# Patient Record
Sex: MALE | URBAN - METROPOLITAN AREA
[De-identification: names, ages, dates, MRNs, and addresses within clinical notes are randomized per-mention and may not be internally consistent; named-entity substitution may affect disease eponyms.]

---

## 2020-06-29 ENCOUNTER — AMB VIDEO VISIT (OUTPATIENT)
Dept: OTHER | Facility: HOSPITAL | Age: 60
End: 2020-06-29

## 2020-06-29 PROCEDURE — EVISIT: Performed by: FAMILY MEDICINE

## 2022-01-01 ENCOUNTER — APPOINTMENT (INPATIENT)
Dept: RADIOLOGY | Facility: HOSPITAL | Age: 62
DRG: 981 | End: 2022-01-01
Payer: COMMERCIAL

## 2022-01-01 ENCOUNTER — HOSPITAL ENCOUNTER (INPATIENT)
Facility: HOSPITAL | Age: 62
LOS: 18 days | DRG: 981 | End: 2022-09-13
Attending: INTERNAL MEDICINE | Admitting: INTERNAL MEDICINE
Payer: COMMERCIAL

## 2022-01-01 ENCOUNTER — APPOINTMENT (OUTPATIENT)
Dept: RADIOLOGY | Facility: HOSPITAL | Age: 62
DRG: 981 | End: 2022-01-01
Payer: COMMERCIAL

## 2022-01-01 ENCOUNTER — APPOINTMENT (INPATIENT)
Dept: NON INVASIVE DIAGNOSTICS | Facility: HOSPITAL | Age: 62
DRG: 981 | End: 2022-01-01
Payer: COMMERCIAL

## 2022-01-01 ENCOUNTER — APPOINTMENT (OUTPATIENT)
Dept: RADIOLOGY | Facility: HOSPITAL | Age: 62
DRG: 981 | End: 2022-01-01
Attending: RADIOLOGY
Payer: COMMERCIAL

## 2022-01-01 ENCOUNTER — HOME CARE VISIT (OUTPATIENT)
Dept: HOME HEALTH SERVICES | Facility: HOME HEALTHCARE | Age: 62
End: 2022-01-01
Payer: COMMERCIAL

## 2022-01-01 ENCOUNTER — APPOINTMENT (OUTPATIENT)
Dept: NEUROLOGY | Facility: CLINIC | Age: 62
DRG: 981 | End: 2022-01-01
Payer: COMMERCIAL

## 2022-01-01 ENCOUNTER — HOSPICE ADMISSION (OUTPATIENT)
Dept: HOME HOSPICE | Facility: HOSPICE | Age: 62
End: 2022-01-01
Payer: COMMERCIAL

## 2022-01-01 VITALS
SYSTOLIC BLOOD PRESSURE: 135 MMHG | RESPIRATION RATE: 23 BRPM | TEMPERATURE: 100.4 F | HEIGHT: 64 IN | WEIGHT: 158.07 LBS | OXYGEN SATURATION: 94 % | DIASTOLIC BLOOD PRESSURE: 72 MMHG | HEART RATE: 95 BPM | BODY MASS INDEX: 26.99 KG/M2

## 2022-01-01 DIAGNOSIS — I46.9 CARDIAC ARREST (HCC): ICD-10-CM

## 2022-01-01 DIAGNOSIS — Z98.890 HISTORY OF EMBOLECTOMY: ICD-10-CM

## 2022-01-01 DIAGNOSIS — D72.825 BANDEMIA: ICD-10-CM

## 2022-01-01 DIAGNOSIS — Z71.89 GOALS OF CARE, COUNSELING/DISCUSSION: ICD-10-CM

## 2022-01-01 DIAGNOSIS — I26.99 PULMONARY EMBOLISM (HCC): ICD-10-CM

## 2022-01-01 DIAGNOSIS — I61.9 HEMORRHAGIC STROKE (HCC): Primary | ICD-10-CM

## 2022-01-01 DIAGNOSIS — L60.2 OVERGROWN TOENAILS: ICD-10-CM

## 2022-01-01 LAB
2HR DELTA HS TROPONIN: 2151 NG/L
4HR DELTA HS TROPONIN: 1935 NG/L
ABO GROUP BLD: NORMAL
ALBUMIN SERPL BCP-MCNC: 2.1 G/DL (ref 3.5–5)
ALBUMIN SERPL BCP-MCNC: 2.8 G/DL (ref 3.5–5)
ALBUMIN SERPL BCP-MCNC: 3.3 G/DL (ref 3.5–5)
ALP SERPL-CCNC: 104 U/L (ref 46–116)
ALP SERPL-CCNC: 126 U/L (ref 46–116)
ALP SERPL-CCNC: 75 U/L (ref 46–116)
ALT SERPL W P-5'-P-CCNC: 360 U/L (ref 12–78)
ALT SERPL W P-5'-P-CCNC: 40 U/L (ref 12–78)
ALT SERPL W P-5'-P-CCNC: 79 U/L (ref 12–78)
ANION GAP SERPL CALCULATED.3IONS-SCNC: 1 MMOL/L (ref 4–13)
ANION GAP SERPL CALCULATED.3IONS-SCNC: 1 MMOL/L (ref 4–13)
ANION GAP SERPL CALCULATED.3IONS-SCNC: 3 MMOL/L (ref 4–13)
ANION GAP SERPL CALCULATED.3IONS-SCNC: 4 MMOL/L (ref 4–13)
ANION GAP SERPL CALCULATED.3IONS-SCNC: 5 MMOL/L (ref 4–13)
ANION GAP SERPL CALCULATED.3IONS-SCNC: 6 MMOL/L (ref 4–13)
ANION GAP SERPL CALCULATED.3IONS-SCNC: 7 MMOL/L (ref 4–13)
ANION GAP SERPL CALCULATED.3IONS-SCNC: 8 MMOL/L (ref 4–13)
AORTIC ROOT: 3.2 CM
AORTIC ROOT: 3.3 CM
APICAL FOUR CHAMBER EJECTION FRACTION: 52 %
APICAL FOUR CHAMBER EJECTION FRACTION: 59 %
APTT PPP: 34 SECONDS (ref 23–37)
APTT PPP: 67 SECONDS (ref 23–37)
APTT PPP: 77 SECONDS (ref 23–37)
APTT PPP: 82 SECONDS (ref 23–37)
APTT PPP: 93 SECONDS (ref 23–37)
APTT SCREEN TO CONFIRM RATIO: 1 RATIO (ref 0–1.34)
ARTERIAL PATENCY WRIST A: NO
ASCENDING AORTA: 3.6 CM
AST SERPL W P-5'-P-CCNC: 12 U/L (ref 5–45)
AST SERPL W P-5'-P-CCNC: 29 U/L (ref 5–45)
AST SERPL W P-5'-P-CCNC: 291 U/L (ref 5–45)
ATRIAL RATE: 107 BPM
ATRIAL RATE: 119 BPM
ATRIAL RATE: 76 BPM
B2 GLYCOPROT1 IGA SERPL IA-ACNC: 2.1
B2 GLYCOPROT1 IGG SERPL IA-ACNC: 1.1
B2 GLYCOPROT1 IGM SERPL IA-ACNC: <2.9
BACTERIA BLD CULT: NORMAL
BACTERIA SPT RESP CULT: ABNORMAL
BACTERIA UR QL AUTO: ABNORMAL /HPF
BASE EXCESS BLDA CALC-SCNC: -0.3 MMOL/L
BASE EXCESS BLDA CALC-SCNC: -3 MMOL/L (ref -2–3)
BASE EXCESS BLDA CALC-SCNC: -3.2 MMOL/L
BASE EXCESS BLDA CALC-SCNC: -3.8 MMOL/L
BASE EXCESS BLDA CALC-SCNC: 1 MMOL/L (ref -2–3)
BASE EXCESS BLDA CALC-SCNC: 1.2 MMOL/L
BASE EXCESS BLDA CALC-SCNC: 1.5 MMOL/L
BASOPHILS # BLD AUTO: 0 THOUSANDS/ΜL (ref 0–0.1)
BASOPHILS # BLD AUTO: 0.03 THOUSANDS/ΜL (ref 0–0.1)
BASOPHILS # BLD AUTO: 0.04 THOUSANDS/ΜL (ref 0–0.1)
BASOPHILS # BLD AUTO: 0.05 THOUSANDS/ΜL (ref 0–0.1)
BASOPHILS # BLD AUTO: 0.06 THOUSANDS/ΜL (ref 0–0.1)
BASOPHILS # BLD AUTO: 0.07 THOUSANDS/ΜL (ref 0–0.1)
BASOPHILS # BLD AUTO: 0.09 THOUSANDS/ΜL (ref 0–0.1)
BASOPHILS # BLD MANUAL: 0 THOUSAND/UL (ref 0–0.1)
BASOPHILS NFR BLD AUTO: 0 % (ref 0–1)
BASOPHILS NFR BLD AUTO: 1 % (ref 0–1)
BASOPHILS NFR MAR MANUAL: 0 % (ref 0–1)
BILIRUB DIRECT SERPL-MCNC: 0.17 MG/DL (ref 0–0.2)
BILIRUB SERPL-MCNC: 0.47 MG/DL (ref 0.2–1)
BILIRUB SERPL-MCNC: 1.48 MG/DL (ref 0.2–1)
BILIRUB SERPL-MCNC: 2 MG/DL (ref 0.2–1)
BILIRUB UR QL STRIP: NEGATIVE
BLD GP AB SCN SERPL QL: NEGATIVE
BUN SERPL-MCNC: 20 MG/DL (ref 5–25)
BUN SERPL-MCNC: 24 MG/DL (ref 5–25)
BUN SERPL-MCNC: 24 MG/DL (ref 5–25)
BUN SERPL-MCNC: 25 MG/DL (ref 5–25)
BUN SERPL-MCNC: 28 MG/DL (ref 5–25)
BUN SERPL-MCNC: 30 MG/DL (ref 5–25)
BUN SERPL-MCNC: 30 MG/DL (ref 5–25)
BUN SERPL-MCNC: 31 MG/DL (ref 5–25)
BUN SERPL-MCNC: 34 MG/DL (ref 5–25)
BUN SERPL-MCNC: 36 MG/DL (ref 5–25)
BUN SERPL-MCNC: 41 MG/DL (ref 5–25)
BUN SERPL-MCNC: 41 MG/DL (ref 5–25)
BUN SERPL-MCNC: 42 MG/DL (ref 5–25)
BUN SERPL-MCNC: 45 MG/DL (ref 5–25)
BUN SERPL-MCNC: 46 MG/DL (ref 5–25)
BUN SERPL-MCNC: 47 MG/DL (ref 5–25)
BUN SERPL-MCNC: 51 MG/DL (ref 5–25)
BUN SERPL-MCNC: 52 MG/DL (ref 5–25)
BUN SERPL-MCNC: 56 MG/DL (ref 5–25)
BUN SERPL-MCNC: 60 MG/DL (ref 5–25)
BUN SERPL-MCNC: 61 MG/DL (ref 5–25)
CA-I BLD-SCNC: 0.87 MMOL/L (ref 1.12–1.32)
CA-I BLD-SCNC: 0.98 MMOL/L (ref 1.12–1.32)
CA-I BLD-SCNC: 1.01 MMOL/L (ref 1.12–1.32)
CA-I BLD-SCNC: 1.03 MMOL/L (ref 1.12–1.32)
CA-I BLD-SCNC: 1.08 MMOL/L (ref 1.12–1.32)
CA-I BLD-SCNC: 1.12 MMOL/L (ref 1.12–1.32)
CA-I BLD-SCNC: 1.12 MMOL/L (ref 1.12–1.32)
CALCIUM ALBUM COR SERPL-MCNC: 8.4 MG/DL (ref 8.3–10.1)
CALCIUM ALBUM COR SERPL-MCNC: 9.4 MG/DL (ref 8.3–10.1)
CALCIUM SERPL-MCNC: 7.4 MG/DL (ref 8.3–10.1)
CALCIUM SERPL-MCNC: 7.6 MG/DL (ref 8.3–10.1)
CALCIUM SERPL-MCNC: 7.7 MG/DL (ref 8.3–10.1)
CALCIUM SERPL-MCNC: 7.8 MG/DL (ref 8.3–10.1)
CALCIUM SERPL-MCNC: 7.8 MG/DL (ref 8.3–10.1)
CALCIUM SERPL-MCNC: 7.9 MG/DL (ref 8.3–10.1)
CALCIUM SERPL-MCNC: 8 MG/DL (ref 8.3–10.1)
CALCIUM SERPL-MCNC: 8.1 MG/DL (ref 8.3–10.1)
CALCIUM SERPL-MCNC: 8.3 MG/DL (ref 8.3–10.1)
CALCIUM SERPL-MCNC: 8.5 MG/DL (ref 8.3–10.1)
CALCIUM SERPL-MCNC: 8.6 MG/DL (ref 8.3–10.1)
CALCIUM SERPL-MCNC: 8.7 MG/DL (ref 8.3–10.1)
CALCIUM SERPL-MCNC: 8.8 MG/DL (ref 8.3–10.1)
CALCIUM SERPL-MCNC: 9.8 MG/DL (ref 8.3–10.1)
CARDIAC TROPONIN I PNL SERPL HS: 2302 NG/L
CARDIAC TROPONIN I PNL SERPL HS: 2518 NG/L
CARDIAC TROPONIN I PNL SERPL HS: 367 NG/L
CARDIOLIPIN IGA SER IA-ACNC: 8
CARDIOLIPIN IGG SER IA-ACNC: 1.5
CARDIOLIPIN IGM SER IA-ACNC: 1.9
CHLORIDE SERPL-SCNC: 105 MMOL/L (ref 96–108)
CHLORIDE SERPL-SCNC: 105 MMOL/L (ref 96–108)
CHLORIDE SERPL-SCNC: 106 MMOL/L (ref 96–108)
CHLORIDE SERPL-SCNC: 107 MMOL/L (ref 96–108)
CHLORIDE SERPL-SCNC: 109 MMOL/L (ref 96–108)
CHLORIDE SERPL-SCNC: 109 MMOL/L (ref 96–108)
CHLORIDE SERPL-SCNC: 110 MMOL/L (ref 96–108)
CHLORIDE SERPL-SCNC: 111 MMOL/L (ref 96–108)
CHLORIDE SERPL-SCNC: 111 MMOL/L (ref 96–108)
CHLORIDE SERPL-SCNC: 112 MMOL/L (ref 96–108)
CHLORIDE SERPL-SCNC: 112 MMOL/L (ref 96–108)
CHLORIDE SERPL-SCNC: 114 MMOL/L (ref 96–108)
CHLORIDE SERPL-SCNC: 114 MMOL/L (ref 96–108)
CHLORIDE SERPL-SCNC: 115 MMOL/L (ref 96–108)
CHLORIDE SERPL-SCNC: 115 MMOL/L (ref 96–108)
CHLORIDE SERPL-SCNC: 116 MMOL/L (ref 96–108)
CHLORIDE SERPL-SCNC: 116 MMOL/L (ref 96–108)
CHLORIDE SERPL-SCNC: 117 MMOL/L (ref 96–108)
CHLORIDE SERPL-SCNC: 118 MMOL/L (ref 96–108)
CHLORIDE SERPL-SCNC: 119 MMOL/L (ref 96–108)
CHLORIDE SERPL-SCNC: 119 MMOL/L (ref 96–108)
CHOLEST SERPL-MCNC: 171 MG/DL
CLARITY UR: CLEAR
CO2 SERPL-SCNC: 21 MMOL/L (ref 21–32)
CO2 SERPL-SCNC: 22 MMOL/L (ref 21–32)
CO2 SERPL-SCNC: 24 MMOL/L (ref 21–32)
CO2 SERPL-SCNC: 24 MMOL/L (ref 21–32)
CO2 SERPL-SCNC: 25 MMOL/L (ref 21–32)
CO2 SERPL-SCNC: 26 MMOL/L (ref 21–32)
CO2 SERPL-SCNC: 27 MMOL/L (ref 21–32)
CO2 SERPL-SCNC: 27 MMOL/L (ref 21–32)
CO2 SERPL-SCNC: 28 MMOL/L (ref 21–32)
CO2 SERPL-SCNC: 29 MMOL/L (ref 21–32)
CO2 SERPL-SCNC: 29 MMOL/L (ref 21–32)
CO2 SERPL-SCNC: 31 MMOL/L (ref 21–32)
CO2 SERPL-SCNC: 33 MMOL/L (ref 21–32)
COLOR UR: YELLOW
CONFIRM APTT/NORMAL: 40.1 SEC (ref 0–47.6)
CREAT SERPL-MCNC: 0.81 MG/DL (ref 0.6–1.3)
CREAT SERPL-MCNC: 0.84 MG/DL (ref 0.6–1.3)
CREAT SERPL-MCNC: 0.86 MG/DL (ref 0.6–1.3)
CREAT SERPL-MCNC: 0.9 MG/DL (ref 0.6–1.3)
CREAT SERPL-MCNC: 0.97 MG/DL (ref 0.6–1.3)
CREAT SERPL-MCNC: 0.97 MG/DL (ref 0.6–1.3)
CREAT SERPL-MCNC: 1.01 MG/DL (ref 0.6–1.3)
CREAT SERPL-MCNC: 1.04 MG/DL (ref 0.6–1.3)
CREAT SERPL-MCNC: 1.08 MG/DL (ref 0.6–1.3)
CREAT SERPL-MCNC: 1.09 MG/DL (ref 0.6–1.3)
CREAT SERPL-MCNC: 1.12 MG/DL (ref 0.6–1.3)
CREAT SERPL-MCNC: 1.12 MG/DL (ref 0.6–1.3)
CREAT SERPL-MCNC: 1.14 MG/DL (ref 0.6–1.3)
CREAT SERPL-MCNC: 1.16 MG/DL (ref 0.6–1.3)
CREAT SERPL-MCNC: 1.22 MG/DL (ref 0.6–1.3)
CREAT SERPL-MCNC: 1.23 MG/DL (ref 0.6–1.3)
CREAT SERPL-MCNC: 1.26 MG/DL (ref 0.6–1.3)
CREAT SERPL-MCNC: 1.27 MG/DL (ref 0.6–1.3)
CREAT SERPL-MCNC: 1.53 MG/DL (ref 0.6–1.3)
CREAT SERPL-MCNC: 1.77 MG/DL (ref 0.6–1.3)
CREAT SERPL-MCNC: 1.78 MG/DL (ref 0.6–1.3)
DEPRECATED AT III PPP: 97 % OF NORMAL (ref 92–136)
DRVVT IMM 1:2 NP PPP: 43.2 SEC (ref 0–40.4)
DRVVT SCREEN TO CONFIRM RATIO: 1.2 RATIO (ref 0.8–1.2)
EOSINOPHIL # BLD AUTO: 0 THOUSAND/ΜL (ref 0–0.61)
EOSINOPHIL # BLD AUTO: 0.02 THOUSAND/ΜL (ref 0–0.61)
EOSINOPHIL # BLD AUTO: 0.03 THOUSAND/ΜL (ref 0–0.61)
EOSINOPHIL # BLD AUTO: 0.04 THOUSAND/ΜL (ref 0–0.61)
EOSINOPHIL # BLD AUTO: 0.04 THOUSAND/ΜL (ref 0–0.61)
EOSINOPHIL # BLD AUTO: 0.09 THOUSAND/ΜL (ref 0–0.61)
EOSINOPHIL # BLD AUTO: 0.17 THOUSAND/ΜL (ref 0–0.61)
EOSINOPHIL # BLD AUTO: 0.2 THOUSAND/ΜL (ref 0–0.61)
EOSINOPHIL # BLD AUTO: 0.34 THOUSAND/ΜL (ref 0–0.61)
EOSINOPHIL # BLD MANUAL: 0 THOUSAND/UL (ref 0–0.4)
EOSINOPHIL NFR BLD AUTO: 0 % (ref 0–6)
EOSINOPHIL NFR BLD AUTO: 1 % (ref 0–6)
EOSINOPHIL NFR BLD AUTO: 1 % (ref 0–6)
EOSINOPHIL NFR BLD AUTO: 2 % (ref 0–6)
EOSINOPHIL NFR BLD AUTO: 3 % (ref 0–6)
EOSINOPHIL NFR BLD MANUAL: 0 % (ref 0–6)
ERYTHROCYTE [DISTWIDTH] IN BLOOD BY AUTOMATED COUNT: 12.4 % (ref 11.6–15.1)
ERYTHROCYTE [DISTWIDTH] IN BLOOD BY AUTOMATED COUNT: 12.5 % (ref 11.6–15.1)
ERYTHROCYTE [DISTWIDTH] IN BLOOD BY AUTOMATED COUNT: 12.6 % (ref 11.6–15.1)
ERYTHROCYTE [DISTWIDTH] IN BLOOD BY AUTOMATED COUNT: 12.8 % (ref 11.6–15.1)
ERYTHROCYTE [DISTWIDTH] IN BLOOD BY AUTOMATED COUNT: 12.9 % (ref 11.6–15.1)
ERYTHROCYTE [DISTWIDTH] IN BLOOD BY AUTOMATED COUNT: 13 % (ref 11.6–15.1)
ERYTHROCYTE [DISTWIDTH] IN BLOOD BY AUTOMATED COUNT: 13 % (ref 11.6–15.1)
ERYTHROCYTE [DISTWIDTH] IN BLOOD BY AUTOMATED COUNT: 13.1 % (ref 11.6–15.1)
ERYTHROCYTE [DISTWIDTH] IN BLOOD BY AUTOMATED COUNT: 13.2 % (ref 11.6–15.1)
EST. AVERAGE GLUCOSE BLD GHB EST-MCNC: 235 MG/DL
F2 GENE MUT ANL BLD/T: NORMAL
F5 GENE MUT ANL BLD/T: NORMAL
FLUAV RNA RESP QL NAA+PROBE: NEGATIVE
FLUBV RNA RESP QL NAA+PROBE: NEGATIVE
FRACTIONAL SHORTENING: 28 % (ref 28–44)
FRACTIONAL SHORTENING: 30 % (ref 28–44)
GFR SERPL CREATININE-BSD FRML MDRD: 39 ML/MIN/1.73SQ M
GFR SERPL CREATININE-BSD FRML MDRD: 40 ML/MIN/1.73SQ M
GFR SERPL CREATININE-BSD FRML MDRD: 48 ML/MIN/1.73SQ M
GFR SERPL CREATININE-BSD FRML MDRD: 60 ML/MIN/1.73SQ M
GFR SERPL CREATININE-BSD FRML MDRD: 60 ML/MIN/1.73SQ M
GFR SERPL CREATININE-BSD FRML MDRD: 62 ML/MIN/1.73SQ M
GFR SERPL CREATININE-BSD FRML MDRD: 63 ML/MIN/1.73SQ M
GFR SERPL CREATININE-BSD FRML MDRD: 67 ML/MIN/1.73SQ M
GFR SERPL CREATININE-BSD FRML MDRD: 68 ML/MIN/1.73SQ M
GFR SERPL CREATININE-BSD FRML MDRD: 70 ML/MIN/1.73SQ M
GFR SERPL CREATININE-BSD FRML MDRD: 70 ML/MIN/1.73SQ M
GFR SERPL CREATININE-BSD FRML MDRD: 72 ML/MIN/1.73SQ M
GFR SERPL CREATININE-BSD FRML MDRD: 73 ML/MIN/1.73SQ M
GFR SERPL CREATININE-BSD FRML MDRD: 76 ML/MIN/1.73SQ M
GFR SERPL CREATININE-BSD FRML MDRD: 79 ML/MIN/1.73SQ M
GFR SERPL CREATININE-BSD FRML MDRD: 83 ML/MIN/1.73SQ M
GFR SERPL CREATININE-BSD FRML MDRD: 83 ML/MIN/1.73SQ M
GFR SERPL CREATININE-BSD FRML MDRD: 91 ML/MIN/1.73SQ M
GFR SERPL CREATININE-BSD FRML MDRD: 92 ML/MIN/1.73SQ M
GFR SERPL CREATININE-BSD FRML MDRD: 93 ML/MIN/1.73SQ M
GFR SERPL CREATININE-BSD FRML MDRD: 95 ML/MIN/1.73SQ M
GLUCOSE SERPL-MCNC: 111 MG/DL (ref 65–140)
GLUCOSE SERPL-MCNC: 115 MG/DL (ref 65–140)
GLUCOSE SERPL-MCNC: 119 MG/DL (ref 65–140)
GLUCOSE SERPL-MCNC: 123 MG/DL (ref 65–140)
GLUCOSE SERPL-MCNC: 125 MG/DL (ref 65–140)
GLUCOSE SERPL-MCNC: 127 MG/DL (ref 65–140)
GLUCOSE SERPL-MCNC: 128 MG/DL (ref 65–140)
GLUCOSE SERPL-MCNC: 129 MG/DL (ref 65–140)
GLUCOSE SERPL-MCNC: 131 MG/DL (ref 65–140)
GLUCOSE SERPL-MCNC: 131 MG/DL (ref 65–140)
GLUCOSE SERPL-MCNC: 132 MG/DL (ref 65–140)
GLUCOSE SERPL-MCNC: 134 MG/DL (ref 65–140)
GLUCOSE SERPL-MCNC: 134 MG/DL (ref 65–140)
GLUCOSE SERPL-MCNC: 135 MG/DL (ref 65–140)
GLUCOSE SERPL-MCNC: 135 MG/DL (ref 65–140)
GLUCOSE SERPL-MCNC: 137 MG/DL (ref 65–140)
GLUCOSE SERPL-MCNC: 138 MG/DL (ref 65–140)
GLUCOSE SERPL-MCNC: 143 MG/DL (ref 65–140)
GLUCOSE SERPL-MCNC: 146 MG/DL (ref 65–140)
GLUCOSE SERPL-MCNC: 150 MG/DL (ref 65–140)
GLUCOSE SERPL-MCNC: 150 MG/DL (ref 65–140)
GLUCOSE SERPL-MCNC: 151 MG/DL (ref 65–140)
GLUCOSE SERPL-MCNC: 151 MG/DL (ref 65–140)
GLUCOSE SERPL-MCNC: 152 MG/DL (ref 65–140)
GLUCOSE SERPL-MCNC: 152 MG/DL (ref 65–140)
GLUCOSE SERPL-MCNC: 153 MG/DL (ref 65–140)
GLUCOSE SERPL-MCNC: 155 MG/DL (ref 65–140)
GLUCOSE SERPL-MCNC: 156 MG/DL (ref 65–140)
GLUCOSE SERPL-MCNC: 157 MG/DL (ref 65–140)
GLUCOSE SERPL-MCNC: 158 MG/DL (ref 65–140)
GLUCOSE SERPL-MCNC: 158 MG/DL (ref 65–140)
GLUCOSE SERPL-MCNC: 159 MG/DL (ref 65–140)
GLUCOSE SERPL-MCNC: 159 MG/DL (ref 65–140)
GLUCOSE SERPL-MCNC: 160 MG/DL (ref 65–140)
GLUCOSE SERPL-MCNC: 160 MG/DL (ref 65–140)
GLUCOSE SERPL-MCNC: 163 MG/DL (ref 65–140)
GLUCOSE SERPL-MCNC: 164 MG/DL (ref 65–140)
GLUCOSE SERPL-MCNC: 164 MG/DL (ref 65–140)
GLUCOSE SERPL-MCNC: 166 MG/DL (ref 65–140)
GLUCOSE SERPL-MCNC: 167 MG/DL (ref 65–140)
GLUCOSE SERPL-MCNC: 167 MG/DL (ref 65–140)
GLUCOSE SERPL-MCNC: 171 MG/DL (ref 65–140)
GLUCOSE SERPL-MCNC: 172 MG/DL (ref 65–140)
GLUCOSE SERPL-MCNC: 173 MG/DL (ref 65–140)
GLUCOSE SERPL-MCNC: 177 MG/DL (ref 65–140)
GLUCOSE SERPL-MCNC: 177 MG/DL (ref 65–140)
GLUCOSE SERPL-MCNC: 178 MG/DL (ref 65–140)
GLUCOSE SERPL-MCNC: 183 MG/DL (ref 65–140)
GLUCOSE SERPL-MCNC: 185 MG/DL (ref 65–140)
GLUCOSE SERPL-MCNC: 190 MG/DL (ref 65–140)
GLUCOSE SERPL-MCNC: 193 MG/DL (ref 65–140)
GLUCOSE SERPL-MCNC: 194 MG/DL (ref 65–140)
GLUCOSE SERPL-MCNC: 195 MG/DL (ref 65–140)
GLUCOSE SERPL-MCNC: 196 MG/DL (ref 65–140)
GLUCOSE SERPL-MCNC: 200 MG/DL (ref 65–140)
GLUCOSE SERPL-MCNC: 201 MG/DL (ref 65–140)
GLUCOSE SERPL-MCNC: 201 MG/DL (ref 65–140)
GLUCOSE SERPL-MCNC: 202 MG/DL (ref 65–140)
GLUCOSE SERPL-MCNC: 204 MG/DL (ref 65–140)
GLUCOSE SERPL-MCNC: 205 MG/DL (ref 65–140)
GLUCOSE SERPL-MCNC: 206 MG/DL (ref 65–140)
GLUCOSE SERPL-MCNC: 208 MG/DL (ref 65–140)
GLUCOSE SERPL-MCNC: 212 MG/DL (ref 65–140)
GLUCOSE SERPL-MCNC: 212 MG/DL (ref 65–140)
GLUCOSE SERPL-MCNC: 213 MG/DL (ref 65–140)
GLUCOSE SERPL-MCNC: 214 MG/DL (ref 65–140)
GLUCOSE SERPL-MCNC: 218 MG/DL (ref 65–140)
GLUCOSE SERPL-MCNC: 219 MG/DL (ref 65–140)
GLUCOSE SERPL-MCNC: 221 MG/DL (ref 65–140)
GLUCOSE SERPL-MCNC: 221 MG/DL (ref 65–140)
GLUCOSE SERPL-MCNC: 223 MG/DL (ref 65–140)
GLUCOSE SERPL-MCNC: 223 MG/DL (ref 65–140)
GLUCOSE SERPL-MCNC: 229 MG/DL (ref 65–140)
GLUCOSE SERPL-MCNC: 235 MG/DL (ref 65–140)
GLUCOSE SERPL-MCNC: 237 MG/DL (ref 65–140)
GLUCOSE SERPL-MCNC: 238 MG/DL (ref 65–140)
GLUCOSE SERPL-MCNC: 243 MG/DL (ref 65–140)
GLUCOSE SERPL-MCNC: 246 MG/DL (ref 65–140)
GLUCOSE SERPL-MCNC: 247 MG/DL (ref 65–140)
GLUCOSE SERPL-MCNC: 250 MG/DL (ref 65–140)
GLUCOSE SERPL-MCNC: 251 MG/DL (ref 65–140)
GLUCOSE SERPL-MCNC: 253 MG/DL (ref 65–140)
GLUCOSE SERPL-MCNC: 254 MG/DL (ref 65–140)
GLUCOSE SERPL-MCNC: 255 MG/DL (ref 65–140)
GLUCOSE SERPL-MCNC: 256 MG/DL (ref 65–140)
GLUCOSE SERPL-MCNC: 260 MG/DL (ref 65–140)
GLUCOSE SERPL-MCNC: 260 MG/DL (ref 65–140)
GLUCOSE SERPL-MCNC: 263 MG/DL (ref 65–140)
GLUCOSE SERPL-MCNC: 264 MG/DL (ref 65–140)
GLUCOSE SERPL-MCNC: 269 MG/DL (ref 65–140)
GLUCOSE SERPL-MCNC: 272 MG/DL (ref 65–140)
GLUCOSE SERPL-MCNC: 276 MG/DL (ref 65–140)
GLUCOSE SERPL-MCNC: 276 MG/DL (ref 65–140)
GLUCOSE SERPL-MCNC: 281 MG/DL (ref 65–140)
GLUCOSE SERPL-MCNC: 286 MG/DL (ref 65–140)
GLUCOSE SERPL-MCNC: 291 MG/DL (ref 65–140)
GLUCOSE SERPL-MCNC: 293 MG/DL (ref 65–140)
GLUCOSE SERPL-MCNC: 300 MG/DL (ref 65–140)
GLUCOSE SERPL-MCNC: 306 MG/DL (ref 65–140)
GLUCOSE SERPL-MCNC: 308 MG/DL (ref 65–140)
GLUCOSE SERPL-MCNC: 309 MG/DL (ref 65–140)
GLUCOSE SERPL-MCNC: 309 MG/DL (ref 65–140)
GLUCOSE SERPL-MCNC: 312 MG/DL (ref 65–140)
GLUCOSE SERPL-MCNC: 314 MG/DL (ref 65–140)
GLUCOSE SERPL-MCNC: 317 MG/DL (ref 65–140)
GLUCOSE SERPL-MCNC: 342 MG/DL (ref 65–140)
GLUCOSE SERPL-MCNC: 348 MG/DL (ref 65–140)
GLUCOSE SERPL-MCNC: 353 MG/DL (ref 65–140)
GLUCOSE SERPL-MCNC: 357 MG/DL (ref 65–140)
GLUCOSE UR STRIP-MCNC: ABNORMAL MG/DL
GRAM STN SPEC: ABNORMAL
HBA1C MFR BLD: 9.8 %
HCO3 BLDA-SCNC: 20.2 MMOL/L (ref 22–28)
HCO3 BLDA-SCNC: 20.3 MMOL/L (ref 22–28)
HCO3 BLDA-SCNC: 22 MMOL/L (ref 22–28)
HCO3 BLDA-SCNC: 23.2 MMOL/L (ref 22–28)
HCO3 BLDA-SCNC: 23.5 MMOL/L (ref 22–28)
HCO3 BLDA-SCNC: 23.9 MMOL/L (ref 22–28)
HCO3 BLDA-SCNC: 25.4 MMOL/L (ref 22–28)
HCT VFR BLD AUTO: 26.3 % (ref 36.5–49.3)
HCT VFR BLD AUTO: 27.9 % (ref 36.5–49.3)
HCT VFR BLD AUTO: 30.1 % (ref 36.5–49.3)
HCT VFR BLD AUTO: 30.9 % (ref 36.5–49.3)
HCT VFR BLD AUTO: 32.1 % (ref 36.5–49.3)
HCT VFR BLD AUTO: 32.8 % (ref 36.5–49.3)
HCT VFR BLD AUTO: 35.7 % (ref 36.5–49.3)
HCT VFR BLD AUTO: 37.5 % (ref 36.5–49.3)
HCT VFR BLD AUTO: 40.3 % (ref 36.5–49.3)
HCT VFR BLD AUTO: 41.7 % (ref 36.5–49.3)
HCT VFR BLD AUTO: 42.7 % (ref 36.5–49.3)
HCT VFR BLD AUTO: 43.7 % (ref 36.5–49.3)
HCT VFR BLD AUTO: 44.5 % (ref 36.5–49.3)
HCT VFR BLD AUTO: 45.5 % (ref 36.5–49.3)
HCT VFR BLD AUTO: 46.5 % (ref 36.5–49.3)
HCT VFR BLD AUTO: 46.6 % (ref 36.5–49.3)
HCT VFR BLD AUTO: 47.3 % (ref 36.5–49.3)
HCT VFR BLD AUTO: 48.9 % (ref 36.5–49.3)
HCT VFR BLD AUTO: 51.7 % (ref 36.5–49.3)
HCT VFR BLD CALC: 33 % (ref 36.5–49.3)
HCT VFR BLD CALC: 40 % (ref 36.5–49.3)
HDLC SERPL-MCNC: 40 MG/DL
HGB BLD-MCNC: 10 G/DL (ref 12–17)
HGB BLD-MCNC: 10.6 G/DL (ref 12–17)
HGB BLD-MCNC: 10.8 G/DL (ref 12–17)
HGB BLD-MCNC: 10.9 G/DL (ref 12–17)
HGB BLD-MCNC: 12.4 G/DL (ref 12–17)
HGB BLD-MCNC: 12.9 G/DL (ref 12–17)
HGB BLD-MCNC: 13.5 G/DL (ref 12–17)
HGB BLD-MCNC: 13.9 G/DL (ref 12–17)
HGB BLD-MCNC: 14.4 G/DL (ref 12–17)
HGB BLD-MCNC: 15 G/DL (ref 12–17)
HGB BLD-MCNC: 15 G/DL (ref 12–17)
HGB BLD-MCNC: 15.1 G/DL (ref 12–17)
HGB BLD-MCNC: 15.4 G/DL (ref 12–17)
HGB BLD-MCNC: 15.7 G/DL (ref 12–17)
HGB BLD-MCNC: 16.4 G/DL (ref 12–17)
HGB BLD-MCNC: 16.8 G/DL (ref 12–17)
HGB BLD-MCNC: 17.7 G/DL (ref 12–17)
HGB BLD-MCNC: 8.6 G/DL (ref 12–17)
HGB BLD-MCNC: 9.3 G/DL (ref 12–17)
HGB BLDA-MCNC: 11.2 G/DL (ref 12–17)
HGB BLDA-MCNC: 13.6 G/DL (ref 12–17)
HGB UR QL STRIP.AUTO: ABNORMAL
HOROWITZ INDEX BLDA+IHG-RTO: 50 MM[HG]
HOROWITZ INDEX BLDA+IHG-RTO: 60 MM[HG]
HOROWITZ INDEX BLDA+IHG-RTO: 80 MM[HG]
IMM GRANULOCYTES # BLD AUTO: 0.05 THOUSAND/UL (ref 0–0.2)
IMM GRANULOCYTES # BLD AUTO: 0.07 THOUSAND/UL (ref 0–0.2)
IMM GRANULOCYTES # BLD AUTO: 0.08 THOUSAND/UL (ref 0–0.2)
IMM GRANULOCYTES # BLD AUTO: 0.09 THOUSAND/UL (ref 0–0.2)
IMM GRANULOCYTES # BLD AUTO: 0.1 THOUSAND/UL (ref 0–0.2)
IMM GRANULOCYTES # BLD AUTO: 0.11 THOUSAND/UL (ref 0–0.2)
IMM GRANULOCYTES # BLD AUTO: 0.11 THOUSAND/UL (ref 0–0.2)
IMM GRANULOCYTES # BLD AUTO: 0.13 THOUSAND/UL (ref 0–0.2)
IMM GRANULOCYTES # BLD AUTO: 0.42 THOUSAND/UL (ref 0–0.2)
IMM GRANULOCYTES NFR BLD AUTO: 0 % (ref 0–2)
IMM GRANULOCYTES NFR BLD AUTO: 1 % (ref 0–2)
IMM GRANULOCYTES NFR BLD AUTO: 2 % (ref 0–2)
INR PPP: 1.33 (ref 0.84–1.19)
INR PPP: 1.45 (ref 0.84–1.19)
INR PPP: 1.49 (ref 0.84–1.19)
INTERVENTRICULAR SEPTUM IN DIASTOLE (PARASTERNAL SHORT AXIS VIEW): 1 CM
INTERVENTRICULAR SEPTUM IN DIASTOLE (PARASTERNAL SHORT AXIS VIEW): 1.5 CM
INTERVENTRICULAR SEPTUM: 1 CM (ref 0.6–1.1)
INTERVENTRICULAR SEPTUM: 1.5 CM (ref 0.6–1.1)
KCT BLD-ACNC: 122 SEC (ref 89–137)
KCT BLD-ACNC: 225 SEC (ref 89–137)
KETONES UR STRIP-MCNC: ABNORMAL MG/DL
L PNEUMO1 AG UR QL IA.RAPID: NEGATIVE
LA PPP-IMP: ABNORMAL
LAAS-AP2: 13.7 CM2
LAAS-AP4: 14 CM2
LACTATE SERPL-SCNC: 1.1 MMOL/L (ref 0.5–2)
LACTATE SERPL-SCNC: 1.4 MMOL/L (ref 0.5–2)
LACTATE SERPL-SCNC: 1.5 MMOL/L (ref 0.5–2)
LACTATE SERPL-SCNC: 2.6 MMOL/L (ref 0.5–2)
LACTATE SERPL-SCNC: 2.9 MMOL/L (ref 0.5–2)
LACTATE SERPL-SCNC: 6 MMOL/L (ref 0.5–2)
LDLC SERPL CALC-MCNC: 112 MG/DL (ref 0–100)
LEFT ATRIUM AREA SYSTOLE SINGLE PLANE A4C: 13.7 CM2
LEFT ATRIUM SIZE: 3.3 CM
LEFT INTERNAL DIMENSION IN SYSTOLE: 2.3 CM (ref 2.1–4)
LEFT INTERNAL DIMENSION IN SYSTOLE: 3 CM (ref 2.1–4)
LEFT VENTRICLE DIASTOLIC VOLUME (MOD BIPLANE): 79 ML
LEFT VENTRICLE SYSTOLIC VOLUME (MOD BIPLANE): 40 ML
LEFT VENTRICULAR INTERNAL DIMENSION IN DIASTOLE: 3.2 CM (ref 3.5–6)
LEFT VENTRICULAR INTERNAL DIMENSION IN DIASTOLE: 4.3 CM (ref 3.5–6)
LEFT VENTRICULAR POSTERIOR WALL IN END DIASTOLE: 1 CM
LEFT VENTRICULAR POSTERIOR WALL IN END DIASTOLE: 1.5 CM
LEFT VENTRICULAR STROKE VOLUME: 23 ML
LEFT VENTRICULAR STROKE VOLUME: 47 ML
LEUKOCYTE ESTERASE UR QL STRIP: NEGATIVE
LV EF: 50 %
LVSV (TEICH): 23 ML
LVSV (TEICH): 47 ML
LYMPHOCYTES # BLD AUTO: 0.39 THOUSANDS/ΜL (ref 0.6–4.47)
LYMPHOCYTES # BLD AUTO: 0.58 THOUSANDS/ΜL (ref 0.6–4.47)
LYMPHOCYTES # BLD AUTO: 0.73 THOUSANDS/ΜL (ref 0.6–4.47)
LYMPHOCYTES # BLD AUTO: 0.73 THOUSANDS/ΜL (ref 0.6–4.47)
LYMPHOCYTES # BLD AUTO: 0.77 THOUSANDS/ΜL (ref 0.6–4.47)
LYMPHOCYTES # BLD AUTO: 0.82 THOUSANDS/ΜL (ref 0.6–4.47)
LYMPHOCYTES # BLD AUTO: 0.83 THOUSANDS/ΜL (ref 0.6–4.47)
LYMPHOCYTES # BLD AUTO: 0.85 THOUSANDS/ΜL (ref 0.6–4.47)
LYMPHOCYTES # BLD AUTO: 0.94 THOUSANDS/ΜL (ref 0.6–4.47)
LYMPHOCYTES # BLD AUTO: 1.22 THOUSANDS/ΜL (ref 0.6–4.47)
LYMPHOCYTES # BLD AUTO: 1.23 THOUSANDS/ΜL (ref 0.6–4.47)
LYMPHOCYTES # BLD AUTO: 26 % (ref 14–44)
LYMPHOCYTES # BLD AUTO: 4.11 THOUSAND/UL (ref 0.6–4.47)
LYMPHOCYTES NFR BLD AUTO: 1 % (ref 14–44)
LYMPHOCYTES NFR BLD AUTO: 10 % (ref 14–44)
LYMPHOCYTES NFR BLD AUTO: 11 % (ref 14–44)
LYMPHOCYTES NFR BLD AUTO: 11 % (ref 14–44)
LYMPHOCYTES NFR BLD AUTO: 5 % (ref 14–44)
LYMPHOCYTES NFR BLD AUTO: 6 % (ref 14–44)
LYMPHOCYTES NFR BLD AUTO: 7 % (ref 14–44)
LYMPHOCYTES NFR BLD AUTO: 7 % (ref 14–44)
MAGNESIUM SERPL-MCNC: 2.1 MG/DL (ref 1.6–2.6)
MAGNESIUM SERPL-MCNC: 2.3 MG/DL (ref 1.6–2.6)
MAGNESIUM SERPL-MCNC: 2.5 MG/DL (ref 1.6–2.6)
MAGNESIUM SERPL-MCNC: 2.6 MG/DL (ref 1.6–2.6)
MAGNESIUM SERPL-MCNC: 2.7 MG/DL (ref 1.6–2.6)
MAGNESIUM SERPL-MCNC: 2.8 MG/DL (ref 1.6–2.6)
MAGNESIUM SERPL-MCNC: 2.8 MG/DL (ref 1.6–2.6)
MAGNESIUM SERPL-MCNC: 2.9 MG/DL (ref 1.6–2.6)
MCH RBC QN AUTO: 29.5 PG (ref 26.8–34.3)
MCH RBC QN AUTO: 29.7 PG (ref 26.8–34.3)
MCH RBC QN AUTO: 30 PG (ref 26.8–34.3)
MCH RBC QN AUTO: 30.1 PG (ref 26.8–34.3)
MCH RBC QN AUTO: 30.3 PG (ref 26.8–34.3)
MCH RBC QN AUTO: 30.3 PG (ref 26.8–34.3)
MCH RBC QN AUTO: 30.5 PG (ref 26.8–34.3)
MCH RBC QN AUTO: 30.6 PG (ref 26.8–34.3)
MCH RBC QN AUTO: 30.6 PG (ref 26.8–34.3)
MCH RBC QN AUTO: 30.8 PG (ref 26.8–34.3)
MCH RBC QN AUTO: 30.8 PG (ref 26.8–34.3)
MCH RBC QN AUTO: 30.9 PG (ref 26.8–34.3)
MCH RBC QN AUTO: 31 PG (ref 26.8–34.3)
MCH RBC QN AUTO: 31.2 PG (ref 26.8–34.3)
MCH RBC QN AUTO: 31.3 PG (ref 26.8–34.3)
MCHC RBC AUTO-ENTMCNC: 32.5 G/DL (ref 31.4–37.4)
MCHC RBC AUTO-ENTMCNC: 32.7 G/DL (ref 31.4–37.4)
MCHC RBC AUTO-ENTMCNC: 33.2 G/DL (ref 31.4–37.4)
MCHC RBC AUTO-ENTMCNC: 33.2 G/DL (ref 31.4–37.4)
MCHC RBC AUTO-ENTMCNC: 33.3 G/DL (ref 31.4–37.4)
MCHC RBC AUTO-ENTMCNC: 33.3 G/DL (ref 31.4–37.4)
MCHC RBC AUTO-ENTMCNC: 33.5 G/DL (ref 31.4–37.4)
MCHC RBC AUTO-ENTMCNC: 33.6 G/DL (ref 31.4–37.4)
MCHC RBC AUTO-ENTMCNC: 33.7 G/DL (ref 31.4–37.4)
MCHC RBC AUTO-ENTMCNC: 33.7 G/DL (ref 31.4–37.4)
MCHC RBC AUTO-ENTMCNC: 33.8 G/DL (ref 31.4–37.4)
MCHC RBC AUTO-ENTMCNC: 34.2 G/DL (ref 31.4–37.4)
MCHC RBC AUTO-ENTMCNC: 34.3 G/DL (ref 31.4–37.4)
MCHC RBC AUTO-ENTMCNC: 34.3 G/DL (ref 31.4–37.4)
MCHC RBC AUTO-ENTMCNC: 34.4 G/DL (ref 31.4–37.4)
MCHC RBC AUTO-ENTMCNC: 34.4 G/DL (ref 31.4–37.4)
MCHC RBC AUTO-ENTMCNC: 34.7 G/DL (ref 31.4–37.4)
MCHC RBC AUTO-ENTMCNC: 35.2 G/DL (ref 31.4–37.4)
MCV RBC AUTO: 86 FL (ref 82–98)
MCV RBC AUTO: 87 FL (ref 82–98)
MCV RBC AUTO: 89 FL (ref 82–98)
MCV RBC AUTO: 90 FL (ref 82–98)
MCV RBC AUTO: 91 FL (ref 82–98)
MCV RBC AUTO: 92 FL (ref 82–98)
MCV RBC AUTO: 92 FL (ref 82–98)
MCV RBC AUTO: 93 FL (ref 82–98)
MCV RBC AUTO: 94 FL (ref 82–98)
MCV RBC AUTO: 95 FL (ref 82–98)
METAMYELOCYTES NFR BLD MANUAL: 1 % (ref 0–1)
MONOCYTES # BLD AUTO: 0.34 THOUSAND/ΜL (ref 0.17–1.22)
MONOCYTES # BLD AUTO: 0.63 THOUSAND/UL (ref 0–1.22)
MONOCYTES # BLD AUTO: 0.64 THOUSAND/ΜL (ref 0.17–1.22)
MONOCYTES # BLD AUTO: 0.66 THOUSAND/ΜL (ref 0.17–1.22)
MONOCYTES # BLD AUTO: 0.69 THOUSAND/ΜL (ref 0.17–1.22)
MONOCYTES # BLD AUTO: 0.72 THOUSAND/ΜL (ref 0.17–1.22)
MONOCYTES # BLD AUTO: 0.75 THOUSAND/ΜL (ref 0.17–1.22)
MONOCYTES # BLD AUTO: 0.79 THOUSAND/ΜL (ref 0.17–1.22)
MONOCYTES # BLD AUTO: 1.09 THOUSAND/ΜL (ref 0.17–1.22)
MONOCYTES # BLD AUTO: 1.12 THOUSAND/ΜL (ref 0.17–1.22)
MONOCYTES # BLD AUTO: 1.22 THOUSAND/ΜL (ref 0.17–1.22)
MONOCYTES # BLD AUTO: 2.17 THOUSAND/ΜL (ref 0.17–1.22)
MONOCYTES NFR BLD AUTO: 10 % (ref 4–12)
MONOCYTES NFR BLD AUTO: 11 % (ref 4–12)
MONOCYTES NFR BLD AUTO: 3 % (ref 4–12)
MONOCYTES NFR BLD AUTO: 5 % (ref 4–12)
MONOCYTES NFR BLD AUTO: 6 % (ref 4–12)
MONOCYTES NFR BLD AUTO: 7 % (ref 4–12)
MONOCYTES NFR BLD AUTO: 8 % (ref 4–12)
MONOCYTES NFR BLD: 4 % (ref 4–12)
MRSA NOSE QL CULT: NORMAL
MUCOUS THREADS UR QL AUTO: ABNORMAL
MYELOCYTES NFR BLD MANUAL: 3 % (ref 0–1)
NEUTROPHILS # BLD AUTO: 10.84 THOUSANDS/ΜL (ref 1.85–7.62)
NEUTROPHILS # BLD AUTO: 11.86 THOUSANDS/ΜL (ref 1.85–7.62)
NEUTROPHILS # BLD AUTO: 13.08 THOUSANDS/ΜL (ref 1.85–7.62)
NEUTROPHILS # BLD AUTO: 14.99 THOUSANDS/ΜL (ref 1.85–7.62)
NEUTROPHILS # BLD AUTO: 15.91 THOUSANDS/ΜL (ref 1.85–7.62)
NEUTROPHILS # BLD AUTO: 25.64 THOUSANDS/ΜL (ref 1.85–7.62)
NEUTROPHILS # BLD AUTO: 7.52 THOUSANDS/ΜL (ref 1.85–7.62)
NEUTROPHILS # BLD AUTO: 8.31 THOUSANDS/ΜL (ref 1.85–7.62)
NEUTROPHILS # BLD AUTO: 8.36 THOUSANDS/ΜL (ref 1.85–7.62)
NEUTROPHILS # BLD AUTO: 9.39 THOUSANDS/ΜL (ref 1.85–7.62)
NEUTROPHILS # BLD AUTO: 9.81 THOUSANDS/ΜL (ref 1.85–7.62)
NEUTROPHILS # BLD MANUAL: 10.43 THOUSAND/UL (ref 1.85–7.62)
NEUTS BAND NFR BLD MANUAL: 4 % (ref 0–8)
NEUTS SEG NFR BLD AUTO: 62 % (ref 43–75)
NEUTS SEG NFR BLD AUTO: 76 % (ref 43–75)
NEUTS SEG NFR BLD AUTO: 77 % (ref 43–75)
NEUTS SEG NFR BLD AUTO: 81 % (ref 43–75)
NEUTS SEG NFR BLD AUTO: 83 % (ref 43–75)
NEUTS SEG NFR BLD AUTO: 85 % (ref 43–75)
NEUTS SEG NFR BLD AUTO: 86 % (ref 43–75)
NEUTS SEG NFR BLD AUTO: 88 % (ref 43–75)
NEUTS SEG NFR BLD AUTO: 88 % (ref 43–75)
NEUTS SEG NFR BLD AUTO: 89 % (ref 43–75)
NEUTS SEG NFR BLD AUTO: 89 % (ref 43–75)
NEUTS SEG NFR BLD AUTO: 92 % (ref 43–75)
NITRITE UR QL STRIP: NEGATIVE
NON-SQ EPI CELLS URNS QL MICRO: ABNORMAL /HPF
NRBC BLD AUTO-RTO: 0 /100 WBCS
O2 CT BLDA-SCNC: 17.1 ML/DL (ref 16–23)
O2 CT BLDA-SCNC: 18.3 ML/DL (ref 16–23)
O2 CT BLDA-SCNC: 18.4 ML/DL (ref 16–23)
O2 CT BLDA-SCNC: 18.9 ML/DL (ref 16–23)
O2 CT BLDA-SCNC: 23.5 ML/DL (ref 16–23)
OXYHGB MFR BLDA: 97.1 % (ref 94–97)
OXYHGB MFR BLDA: 98 % (ref 94–97)
OXYHGB MFR BLDA: 98.2 % (ref 94–97)
OXYHGB MFR BLDA: 98.3 % (ref 94–97)
OXYHGB MFR BLDA: 98.5 % (ref 94–97)
P AXIS: 55 DEGREES
P AXIS: 58 DEGREES
P AXIS: 71 DEGREES
PCO2 BLD: 23 MMOL/L (ref 21–32)
PCO2 BLD: 27 MMOL/L (ref 21–32)
PCO2 BLD: 38.8 MM HG (ref 36–44)
PCO2 BLD: 40 MM HG (ref 36–44)
PCO2 BLDA: 30.5 MM HG (ref 36–44)
PCO2 BLDA: 30.9 MM HG (ref 36–44)
PCO2 BLDA: 32.2 MM HG (ref 36–44)
PCO2 BLDA: 34.2 MM HG (ref 36–44)
PCO2 BLDA: 34.3 MM HG (ref 36–44)
PEEP RESPIRATORY: 6 CM[H2O]
PH BLD: 7.36 [PH] (ref 7.35–7.45)
PH BLD: 7.41 [PH] (ref 7.35–7.45)
PH BLDA: 7.39 [PH] (ref 7.35–7.45)
PH BLDA: 7.42 [PH] (ref 7.35–7.45)
PH BLDA: 7.45 [PH] (ref 7.35–7.45)
PH BLDA: 7.5 [PH] (ref 7.35–7.45)
PH BLDA: 7.51 [PH] (ref 7.35–7.45)
PH UR STRIP.AUTO: 5.5 [PH]
PHOSPHATE SERPL-MCNC: 1.8 MG/DL (ref 2.3–4.1)
PHOSPHATE SERPL-MCNC: 2.3 MG/DL (ref 2.3–4.1)
PHOSPHATE SERPL-MCNC: 3.1 MG/DL (ref 2.3–4.1)
PHOSPHATE SERPL-MCNC: 3.3 MG/DL (ref 2.3–4.1)
PHOSPHATE SERPL-MCNC: 3.3 MG/DL (ref 2.3–4.1)
PHOSPHATE SERPL-MCNC: 3.4 MG/DL (ref 2.3–4.1)
PHOSPHATE SERPL-MCNC: 3.7 MG/DL (ref 2.3–4.1)
PHOSPHATE SERPL-MCNC: 3.8 MG/DL (ref 2.3–4.1)
PHOSPHATE SERPL-MCNC: 4.6 MG/DL (ref 2.3–4.1)
PHOSPHATE SERPL-MCNC: 5.2 MG/DL (ref 2.3–4.1)
PLATELET # BLD AUTO: 179 THOUSANDS/UL (ref 149–390)
PLATELET # BLD AUTO: 181 THOUSANDS/UL (ref 149–390)
PLATELET # BLD AUTO: 192 THOUSANDS/UL (ref 149–390)
PLATELET # BLD AUTO: 193 THOUSANDS/UL (ref 149–390)
PLATELET # BLD AUTO: 200 THOUSANDS/UL (ref 149–390)
PLATELET # BLD AUTO: 211 THOUSANDS/UL (ref 149–390)
PLATELET # BLD AUTO: 217 THOUSANDS/UL (ref 149–390)
PLATELET # BLD AUTO: 217 THOUSANDS/UL (ref 149–390)
PLATELET # BLD AUTO: 253 THOUSANDS/UL (ref 149–390)
PLATELET # BLD AUTO: 268 THOUSANDS/UL (ref 149–390)
PLATELET # BLD AUTO: 268 THOUSANDS/UL (ref 149–390)
PLATELET # BLD AUTO: 274 THOUSANDS/UL (ref 149–390)
PLATELET # BLD AUTO: 295 THOUSANDS/UL (ref 149–390)
PLATELET # BLD AUTO: 297 THOUSANDS/UL (ref 149–390)
PLATELET # BLD AUTO: 306 THOUSANDS/UL (ref 149–390)
PLATELET # BLD AUTO: 310 THOUSANDS/UL (ref 149–390)
PLATELET # BLD AUTO: 395 THOUSANDS/UL (ref 149–390)
PLATELET # BLD AUTO: 411 THOUSANDS/UL (ref 149–390)
PLATELET BLD QL SMEAR: ADEQUATE
PMV BLD AUTO: 10.5 FL (ref 8.9–12.7)
PMV BLD AUTO: 10.6 FL (ref 8.9–12.7)
PMV BLD AUTO: 10.7 FL (ref 8.9–12.7)
PMV BLD AUTO: 10.8 FL (ref 8.9–12.7)
PMV BLD AUTO: 10.8 FL (ref 8.9–12.7)
PMV BLD AUTO: 10.9 FL (ref 8.9–12.7)
PMV BLD AUTO: 11.1 FL (ref 8.9–12.7)
PMV BLD AUTO: 11.2 FL (ref 8.9–12.7)
PMV BLD AUTO: 11.2 FL (ref 8.9–12.7)
PMV BLD AUTO: 11.6 FL (ref 8.9–12.7)
PMV BLD AUTO: 12 FL (ref 8.9–12.7)
PMV BLD AUTO: 12.1 FL (ref 8.9–12.7)
PMV BLD AUTO: 12.3 FL (ref 8.9–12.7)
PO2 BLD: 340 MM HG (ref 75–129)
PO2 BLD: >400 MM HG (ref 75–129)
PO2 BLDA: 119.1 MM HG (ref 75–129)
PO2 BLDA: 205.2 MM HG (ref 75–129)
PO2 BLDA: 205.4 MM HG (ref 75–129)
PO2 BLDA: 234 MM HG (ref 75–129)
PO2 BLDA: 338.1 MM HG (ref 75–129)
POLYCHROMASIA BLD QL SMEAR: PRESENT
POTASSIUM BLD-SCNC: 3.4 MMOL/L (ref 3.5–5.3)
POTASSIUM BLD-SCNC: 3.7 MMOL/L (ref 3.5–5.3)
POTASSIUM SERPL-SCNC: 2.8 MMOL/L (ref 3.5–5.3)
POTASSIUM SERPL-SCNC: 3.2 MMOL/L (ref 3.5–5.3)
POTASSIUM SERPL-SCNC: 3.2 MMOL/L (ref 3.5–5.3)
POTASSIUM SERPL-SCNC: 3.4 MMOL/L (ref 3.5–5.3)
POTASSIUM SERPL-SCNC: 3.4 MMOL/L (ref 3.5–5.3)
POTASSIUM SERPL-SCNC: 3.5 MMOL/L (ref 3.5–5.3)
POTASSIUM SERPL-SCNC: 3.6 MMOL/L (ref 3.5–5.3)
POTASSIUM SERPL-SCNC: 3.6 MMOL/L (ref 3.5–5.3)
POTASSIUM SERPL-SCNC: 3.7 MMOL/L (ref 3.5–5.3)
POTASSIUM SERPL-SCNC: 3.8 MMOL/L (ref 3.5–5.3)
POTASSIUM SERPL-SCNC: 3.8 MMOL/L (ref 3.5–5.3)
POTASSIUM SERPL-SCNC: 4 MMOL/L (ref 3.5–5.3)
POTASSIUM SERPL-SCNC: 4.1 MMOL/L (ref 3.5–5.3)
POTASSIUM SERPL-SCNC: 4.1 MMOL/L (ref 3.5–5.3)
POTASSIUM SERPL-SCNC: 4.2 MMOL/L (ref 3.5–5.3)
POTASSIUM SERPL-SCNC: 4.3 MMOL/L (ref 3.5–5.3)
POTASSIUM SERPL-SCNC: 5.1 MMOL/L (ref 3.5–5.3)
PR INTERVAL: 146 MS
PR INTERVAL: 146 MS
PR INTERVAL: 154 MS
PROCALCITONIN SERPL-MCNC: 0.12 NG/ML
PROCALCITONIN SERPL-MCNC: 0.14 NG/ML
PROCALCITONIN SERPL-MCNC: 0.16 NG/ML
PROCALCITONIN SERPL-MCNC: 0.23 NG/ML
PROCALCITONIN SERPL-MCNC: 7.41 NG/ML
PROT C AG ACT/NOR PPP IA: 116 % OF NORMAL (ref 60–150)
PROT S ACT/NOR PPP: 111 % (ref 71–117)
PROT S ACT/NOR PPP: 127 % (ref 61–136)
PROT S PPP-ACNC: 100 % (ref 60–150)
PROT SERPL-MCNC: 5.7 G/DL (ref 6.4–8.4)
PROT SERPL-MCNC: 5.8 G/DL (ref 6.4–8.4)
PROT SERPL-MCNC: 7 G/DL (ref 6.4–8.4)
PROT UR STRIP-MCNC: ABNORMAL MG/DL
PROTHROMBIN TIME: 16.7 SECONDS (ref 11.6–14.5)
PROTHROMBIN TIME: 17.9 SECONDS (ref 11.6–14.5)
PROTHROMBIN TIME: 18.3 SECONDS (ref 11.6–14.5)
QRS AXIS: -35 DEGREES
QRS AXIS: -75 DEGREES
QRS AXIS: -9 DEGREES
QRSD INTERVAL: 104 MS
QRSD INTERVAL: 104 MS
QRSD INTERVAL: 108 MS
QT INTERVAL: 342 MS
QT INTERVAL: 342 MS
QT INTERVAL: 426 MS
QTC INTERVAL: 457 MS
QTC INTERVAL: 479 MS
QTC INTERVAL: 482 MS
RBC # BLD AUTO: 2.78 MILLION/UL (ref 3.88–5.62)
RBC # BLD AUTO: 3.04 MILLION/UL (ref 3.88–5.62)
RBC # BLD AUTO: 3.21 MILLION/UL (ref 3.88–5.62)
RBC # BLD AUTO: 3.39 MILLION/UL (ref 3.88–5.62)
RBC # BLD AUTO: 3.53 MILLION/UL (ref 3.88–5.62)
RBC # BLD AUTO: 4 MILLION/UL (ref 3.88–5.62)
RBC # BLD AUTO: 4.19 MILLION/UL (ref 3.88–5.62)
RBC # BLD AUTO: 4.42 MILLION/UL (ref 3.88–5.62)
RBC # BLD AUTO: 4.62 MILLION/UL (ref 3.88–5.62)
RBC # BLD AUTO: 4.76 MILLION/UL (ref 3.88–5.62)
RBC # BLD AUTO: 4.92 MILLION/UL (ref 3.88–5.62)
RBC # BLD AUTO: 4.95 MILLION/UL (ref 3.88–5.62)
RBC # BLD AUTO: 5.08 MILLION/UL (ref 3.88–5.62)
RBC # BLD AUTO: 5.09 MILLION/UL (ref 3.88–5.62)
RBC # BLD AUTO: 5.14 MILLION/UL (ref 3.88–5.62)
RBC # BLD AUTO: 5.37 MILLION/UL (ref 3.88–5.62)
RBC # BLD AUTO: 5.51 MILLION/UL (ref 3.88–5.62)
RBC # BLD AUTO: 6 MILLION/UL (ref 3.88–5.62)
RBC #/AREA URNS AUTO: ABNORMAL /HPF
RBC MORPH BLD: PRESENT
RH BLD: POSITIVE
RIGHT ATRIUM AREA SYSTOLE A4C: 13.6 CM2
RIGHT VENTRICLE ID DIMENSION: 3.9 CM
RSV RNA RESP QL NAA+PROBE: NEGATIVE
S PNEUM AG UR QL: NEGATIVE
SAO2 % BLD FROM PO2: 100 % (ref 60–85)
SARS-COV-2 RNA RESP QL NAA+PROBE: NEGATIVE
SCREEN APTT: 35 SEC (ref 0–51.9)
SCREEN DRVVT: 51.4 SEC (ref 0–47)
SL CV LEFT ATRIUM LENGTH A2C: 4.2 CM
SL CV LV EF: 50
SL CV LV EF: 60
SL CV LV EF: 60
SL CV PED ECHO LEFT VENTRICLE DIASTOLIC VOLUME (MOD BIPLANE) 2D: 41 ML
SL CV PED ECHO LEFT VENTRICLE DIASTOLIC VOLUME (MOD BIPLANE) 2D: 83 ML
SL CV PED ECHO LEFT VENTRICLE SYSTOLIC VOLUME (MOD BIPLANE) 2D: 18 ML
SL CV PED ECHO LEFT VENTRICLE SYSTOLIC VOLUME (MOD BIPLANE) 2D: 36 ML
SODIUM BLD-SCNC: 146 MMOL/L (ref 136–145)
SODIUM BLD-SCNC: 148 MMOL/L (ref 136–145)
SODIUM SERPL-SCNC: 136 MMOL/L (ref 135–147)
SODIUM SERPL-SCNC: 138 MMOL/L (ref 135–147)
SODIUM SERPL-SCNC: 139 MMOL/L (ref 135–147)
SODIUM SERPL-SCNC: 140 MMOL/L (ref 135–147)
SODIUM SERPL-SCNC: 141 MMOL/L (ref 135–147)
SODIUM SERPL-SCNC: 141 MMOL/L (ref 135–147)
SODIUM SERPL-SCNC: 142 MMOL/L (ref 135–147)
SODIUM SERPL-SCNC: 143 MMOL/L (ref 135–147)
SODIUM SERPL-SCNC: 143 MMOL/L (ref 135–147)
SODIUM SERPL-SCNC: 144 MMOL/L (ref 135–147)
SODIUM SERPL-SCNC: 146 MMOL/L (ref 135–147)
SODIUM SERPL-SCNC: 146 MMOL/L (ref 135–147)
SODIUM SERPL-SCNC: 147 MMOL/L (ref 135–147)
SODIUM SERPL-SCNC: 148 MMOL/L (ref 135–147)
SODIUM SERPL-SCNC: 148 MMOL/L (ref 135–147)
SODIUM SERPL-SCNC: 149 MMOL/L (ref 135–147)
SODIUM SERPL-SCNC: 149 MMOL/L (ref 135–147)
SODIUM SERPL-SCNC: 150 MMOL/L (ref 135–147)
SODIUM SERPL-SCNC: 150 MMOL/L (ref 135–147)
SP GR UR STRIP.AUTO: 1.02 (ref 1–1.03)
SPECIMEN EXPIRATION DATE: NORMAL
SPECIMEN SOURCE: ABNORMAL
SPECIMEN SOURCE: NORMAL
T WAVE AXIS: 135 DEGREES
T WAVE AXIS: 171 DEGREES
T WAVE AXIS: 92 DEGREES
THROMBIN TIME: 15.8 SEC (ref 0–23)
TRIGL SERPL-MCNC: 95 MG/DL
UROBILINOGEN UR STRIP-ACNC: <2 MG/DL
VANCOMYCIN SERPL-MCNC: 11.2 UG/ML (ref 10–20)
VENT AC: 14
VENT AC: 16
VENT AC: 18
VENT AC: 20
VENT AC: 20
VENT- AC: AC
VENTRICULAR RATE: 107 BPM
VENTRICULAR RATE: 119 BPM
VENTRICULAR RATE: 76 BPM
VT SETTING VENT: 350 ML
VT SETTING VENT: 370 ML
VT SETTING VENT: 400 ML
WBC # BLD AUTO: 10.1 THOUSAND/UL (ref 4.31–10.16)
WBC # BLD AUTO: 10.88 THOUSAND/UL (ref 4.31–10.16)
WBC # BLD AUTO: 10.95 THOUSAND/UL (ref 4.31–10.16)
WBC # BLD AUTO: 11.4 THOUSAND/UL (ref 4.31–10.16)
WBC # BLD AUTO: 11.42 THOUSAND/UL (ref 4.31–10.16)
WBC # BLD AUTO: 12.57 THOUSAND/UL (ref 4.31–10.16)
WBC # BLD AUTO: 12.83 THOUSAND/UL (ref 4.31–10.16)
WBC # BLD AUTO: 12.93 THOUSAND/UL (ref 4.31–10.16)
WBC # BLD AUTO: 14.75 THOUSAND/UL (ref 4.31–10.16)
WBC # BLD AUTO: 14.9 THOUSAND/UL (ref 4.31–10.16)
WBC # BLD AUTO: 15.8 THOUSAND/UL (ref 4.31–10.16)
WBC # BLD AUTO: 16.84 THOUSAND/UL (ref 4.31–10.16)
WBC # BLD AUTO: 18.02 THOUSAND/UL (ref 4.31–10.16)
WBC # BLD AUTO: 28.68 THOUSAND/UL (ref 4.31–10.16)
WBC # BLD AUTO: 7.67 THOUSAND/UL (ref 4.31–10.16)
WBC # BLD AUTO: 8.34 THOUSAND/UL (ref 4.31–10.16)
WBC # BLD AUTO: 8.99 THOUSAND/UL (ref 4.31–10.16)
WBC # BLD AUTO: 9.31 THOUSAND/UL (ref 4.31–10.16)
WBC #/AREA URNS AUTO: ABNORMAL /HPF

## 2022-01-01 PROCEDURE — 84100 ASSAY OF PHOSPHORUS: CPT

## 2022-01-01 PROCEDURE — 99233 SBSQ HOSP IP/OBS HIGH 50: CPT | Performed by: ANESTHESIOLOGY

## 2022-01-01 PROCEDURE — 99232 SBSQ HOSP IP/OBS MODERATE 35: CPT | Performed by: PSYCHIATRY & NEUROLOGY

## 2022-01-01 PROCEDURE — 0241U HB NFCT DS VIR RESP RNA 4 TRGT: CPT | Performed by: STUDENT IN AN ORGANIZED HEALTH CARE EDUCATION/TRAINING PROGRAM

## 2022-01-01 PROCEDURE — 93306 TTE W/DOPPLER COMPLETE: CPT

## 2022-01-01 PROCEDURE — 36014 PLACE CATHETER IN ARTERY: CPT | Performed by: RADIOLOGY

## 2022-01-01 PROCEDURE — 82948 REAGENT STRIP/BLOOD GLUCOSE: CPT

## 2022-01-01 PROCEDURE — 84100 ASSAY OF PHOSPHORUS: CPT | Performed by: STUDENT IN AN ORGANIZED HEALTH CARE EDUCATION/TRAINING PROGRAM

## 2022-01-01 PROCEDURE — 83735 ASSAY OF MAGNESIUM: CPT

## 2022-01-01 PROCEDURE — 4A133B1 MONITORING OF ARTERIAL PRESSURE, PERIPHERAL, PERCUTANEOUS APPROACH: ICD-10-PCS | Performed by: INTERNAL MEDICINE

## 2022-01-01 PROCEDURE — C1894 INTRO/SHEATH, NON-LASER: HCPCS

## 2022-01-01 PROCEDURE — 82805 BLOOD GASES W/O2 SATURATION: CPT

## 2022-01-01 PROCEDURE — 93306 TTE W/DOPPLER COMPLETE: CPT | Performed by: INTERNAL MEDICINE

## 2022-01-01 PROCEDURE — 82805 BLOOD GASES W/O2 SATURATION: CPT | Performed by: STUDENT IN AN ORGANIZED HEALTH CARE EDUCATION/TRAINING PROGRAM

## 2022-01-01 PROCEDURE — 99291 CRITICAL CARE FIRST HOUR: CPT | Performed by: INTERNAL MEDICINE

## 2022-01-01 PROCEDURE — B31TYZZ FLUOROSCOPY OF LEFT PULMONARY ARTERY USING OTHER CONTRAST: ICD-10-PCS | Performed by: RADIOLOGY

## 2022-01-01 PROCEDURE — 85730 THROMBOPLASTIN TIME PARTIAL: CPT | Performed by: INTERNAL MEDICINE

## 2022-01-01 PROCEDURE — 0644T TCAT RMVL/DBLK ICAR MAS PERQ: CPT | Performed by: INTERNAL MEDICINE

## 2022-01-01 PROCEDURE — 99291 CRITICAL CARE FIRST HOUR: CPT | Performed by: ANESTHESIOLOGY

## 2022-01-01 PROCEDURE — 82330 ASSAY OF CALCIUM: CPT | Performed by: PSYCHIATRY & NEUROLOGY

## 2022-01-01 PROCEDURE — 94760 N-INVAS EAR/PLS OXIMETRY 1: CPT

## 2022-01-01 PROCEDURE — 75743 ARTERY X-RAYS LUNGS: CPT

## 2022-01-01 PROCEDURE — 70551 MRI BRAIN STEM W/O DYE: CPT

## 2022-01-01 PROCEDURE — 85300 ANTITHROMBIN III ACTIVITY: CPT | Performed by: PSYCHIATRY & NEUROLOGY

## 2022-01-01 PROCEDURE — 97110 THERAPEUTIC EXERCISES: CPT

## 2022-01-01 PROCEDURE — 83735 ASSAY OF MAGNESIUM: CPT | Performed by: STUDENT IN AN ORGANIZED HEALTH CARE EDUCATION/TRAINING PROGRAM

## 2022-01-01 PROCEDURE — 85306 CLOT INHIBIT PROT S FREE: CPT | Performed by: PSYCHIATRY & NEUROLOGY

## 2022-01-01 PROCEDURE — 87040 BLOOD CULTURE FOR BACTERIA: CPT

## 2022-01-01 PROCEDURE — 99498 ADVNCD CARE PLAN ADDL 30 MIN: CPT | Performed by: INTERNAL MEDICINE

## 2022-01-01 PROCEDURE — 80061 LIPID PANEL: CPT

## 2022-01-01 PROCEDURE — 37184 PRIM ART M-THRMBC 1ST VSL: CPT | Performed by: RADIOLOGY

## 2022-01-01 PROCEDURE — 71045 X-RAY EXAM CHEST 1 VIEW: CPT

## 2022-01-01 PROCEDURE — 80048 BASIC METABOLIC PNL TOTAL CA: CPT

## 2022-01-01 PROCEDURE — 92526 ORAL FUNCTION THERAPY: CPT

## 2022-01-01 PROCEDURE — 84145 PROCALCITONIN (PCT): CPT

## 2022-01-01 PROCEDURE — 99222 1ST HOSP IP/OBS MODERATE 55: CPT | Performed by: STUDENT IN AN ORGANIZED HEALTH CARE EDUCATION/TRAINING PROGRAM

## 2022-01-01 PROCEDURE — 99223 1ST HOSP IP/OBS HIGH 75: CPT | Performed by: NEUROLOGICAL SURGERY

## 2022-01-01 PROCEDURE — 70450 CT HEAD/BRAIN W/O DYE: CPT

## 2022-01-01 PROCEDURE — 99222 1ST HOSP IP/OBS MODERATE 55: CPT | Performed by: PHYSICIAN ASSISTANT

## 2022-01-01 PROCEDURE — NC001 PR NO CHARGE: Performed by: INTERNAL MEDICINE

## 2022-01-01 PROCEDURE — 85730 THROMBOPLASTIN TIME PARTIAL: CPT | Performed by: STUDENT IN AN ORGANIZED HEALTH CARE EDUCATION/TRAINING PROGRAM

## 2022-01-01 PROCEDURE — 80053 COMPREHEN METABOLIC PANEL: CPT | Performed by: STUDENT IN AN ORGANIZED HEALTH CARE EDUCATION/TRAINING PROGRAM

## 2022-01-01 PROCEDURE — 81001 URINALYSIS AUTO W/SCOPE: CPT | Performed by: INTERNAL MEDICINE

## 2022-01-01 PROCEDURE — 94003 VENT MGMT INPAT SUBQ DAY: CPT

## 2022-01-01 PROCEDURE — C1769 GUIDE WIRE: HCPCS

## 2022-01-01 PROCEDURE — 84484 ASSAY OF TROPONIN QUANT: CPT

## 2022-01-01 PROCEDURE — 97535 SELF CARE MNGMENT TRAINING: CPT

## 2022-01-01 PROCEDURE — 93010 ELECTROCARDIOGRAM REPORT: CPT | Performed by: INTERNAL MEDICINE

## 2022-01-01 PROCEDURE — 4A133J1 MONITORING OF ARTERIAL PULSE, PERIPHERAL, PERCUTANEOUS APPROACH: ICD-10-PCS | Performed by: INTERNAL MEDICINE

## 2022-01-01 PROCEDURE — 85025 COMPLETE CBC W/AUTO DIFF WBC: CPT

## 2022-01-01 PROCEDURE — 85025 COMPLETE CBC W/AUTO DIFF WBC: CPT | Performed by: STUDENT IN AN ORGANIZED HEALTH CARE EDUCATION/TRAINING PROGRAM

## 2022-01-01 PROCEDURE — 85303 CLOT INHIBIT PROT C ACTIVITY: CPT | Performed by: PSYCHIATRY & NEUROLOGY

## 2022-01-01 PROCEDURE — 86147 CARDIOLIPIN ANTIBODY EA IG: CPT | Performed by: PSYCHIATRY & NEUROLOGY

## 2022-01-01 PROCEDURE — 85007 BL SMEAR W/DIFF WBC COUNT: CPT

## 2022-01-01 PROCEDURE — 99232 SBSQ HOSP IP/OBS MODERATE 35: CPT | Performed by: HOSPITALIST

## 2022-01-01 PROCEDURE — 83605 ASSAY OF LACTIC ACID: CPT

## 2022-01-01 PROCEDURE — G1004 CDSM NDSC: HCPCS

## 2022-01-01 PROCEDURE — 82947 ASSAY GLUCOSE BLOOD QUANT: CPT

## 2022-01-01 PROCEDURE — 81240 F2 GENE: CPT | Performed by: PSYCHIATRY & NEUROLOGY

## 2022-01-01 PROCEDURE — 80048 BASIC METABOLIC PNL TOTAL CA: CPT | Performed by: STUDENT IN AN ORGANIZED HEALTH CARE EDUCATION/TRAINING PROGRAM

## 2022-01-01 PROCEDURE — 02C63ZZ EXTIRPATION OF MATTER FROM RIGHT ATRIUM, PERCUTANEOUS APPROACH: ICD-10-PCS | Performed by: RADIOLOGY

## 2022-01-01 PROCEDURE — NC001 PR NO CHARGE: Performed by: ANESTHESIOLOGY

## 2022-01-01 PROCEDURE — 86146 BETA-2 GLYCOPROTEIN ANTIBODY: CPT | Performed by: PSYCHIATRY & NEUROLOGY

## 2022-01-01 PROCEDURE — 93325 DOPPLER ECHO COLOR FLOW MAPG: CPT | Performed by: INTERNAL MEDICINE

## 2022-01-01 PROCEDURE — NC001 PR NO CHARGE: Performed by: NURSE PRACTITIONER

## 2022-01-01 PROCEDURE — 99232 SBSQ HOSP IP/OBS MODERATE 35: CPT | Performed by: INTERNAL MEDICINE

## 2022-01-01 PROCEDURE — 85014 HEMATOCRIT: CPT

## 2022-01-01 PROCEDURE — 5A12012 PERFORMANCE OF CARDIAC OUTPUT, SINGLE, MANUAL: ICD-10-PCS | Performed by: STUDENT IN AN ORGANIZED HEALTH CARE EDUCATION/TRAINING PROGRAM

## 2022-01-01 PROCEDURE — 99232 SBSQ HOSP IP/OBS MODERATE 35: CPT | Performed by: STUDENT IN AN ORGANIZED HEALTH CARE EDUCATION/TRAINING PROGRAM

## 2022-01-01 PROCEDURE — 80076 HEPATIC FUNCTION PANEL: CPT

## 2022-01-01 PROCEDURE — 93312 ECHO TRANSESOPHAGEAL: CPT | Performed by: INTERNAL MEDICINE

## 2022-01-01 PROCEDURE — 87040 BLOOD CULTURE FOR BACTERIA: CPT | Performed by: STUDENT IN AN ORGANIZED HEALTH CARE EDUCATION/TRAINING PROGRAM

## 2022-01-01 PROCEDURE — 99223 1ST HOSP IP/OBS HIGH 75: CPT | Performed by: INTERNAL MEDICINE

## 2022-01-01 PROCEDURE — 0T9B70Z DRAINAGE OF BLADDER WITH DRAINAGE DEVICE, VIA NATURAL OR ARTIFICIAL OPENING: ICD-10-PCS | Performed by: UROLOGY

## 2022-01-01 PROCEDURE — T2045 HOSPICE GENERAL CARE: HCPCS

## 2022-01-01 PROCEDURE — 97530 THERAPEUTIC ACTIVITIES: CPT

## 2022-01-01 PROCEDURE — 99233 SBSQ HOSP IP/OBS HIGH 50: CPT | Performed by: INTERNAL MEDICINE

## 2022-01-01 PROCEDURE — 80048 BASIC METABOLIC PNL TOTAL CA: CPT | Performed by: PSYCHIATRY & NEUROLOGY

## 2022-01-01 PROCEDURE — 87070 CULTURE OTHR SPECIMN AEROBIC: CPT

## 2022-01-01 PROCEDURE — 84100 ASSAY OF PHOSPHORUS: CPT | Performed by: PSYCHIATRY & NEUROLOGY

## 2022-01-01 PROCEDURE — 93005 ELECTROCARDIOGRAM TRACING: CPT

## 2022-01-01 PROCEDURE — 93312 ECHO TRANSESOPHAGEAL: CPT

## 2022-01-01 PROCEDURE — 85027 COMPLETE CBC AUTOMATED: CPT | Performed by: STUDENT IN AN ORGANIZED HEALTH CARE EDUCATION/TRAINING PROGRAM

## 2022-01-01 PROCEDURE — 82803 BLOOD GASES ANY COMBINATION: CPT

## 2022-01-01 PROCEDURE — 10330063 VN DURABLE MEDICAL EQUIPMENT, SUPPLIES/MEDS

## 2022-01-01 PROCEDURE — 05HY33Z INSERTION OF INFUSION DEVICE INTO UPPER VEIN, PERCUTANEOUS APPROACH: ICD-10-PCS | Performed by: INTERNAL MEDICINE

## 2022-01-01 PROCEDURE — B24BZZ4 ULTRASONOGRAPHY OF HEART WITH AORTA, TRANSESOPHAGEAL: ICD-10-PCS | Performed by: ANESTHESIOLOGY

## 2022-01-01 PROCEDURE — 76937 US GUIDE VASCULAR ACCESS: CPT

## 2022-01-01 PROCEDURE — 93355 ECHO TRANSESOPHAGEAL (TEE): CPT

## 2022-01-01 PROCEDURE — 85027 COMPLETE CBC AUTOMATED: CPT | Performed by: PSYCHIATRY & NEUROLOGY

## 2022-01-01 PROCEDURE — 84145 PROCALCITONIN (PCT): CPT | Performed by: STUDENT IN AN ORGANIZED HEALTH CARE EDUCATION/TRAINING PROGRAM

## 2022-01-01 PROCEDURE — 87106 FUNGI IDENTIFICATION YEAST: CPT

## 2022-01-01 PROCEDURE — C9113 INJ PANTOPRAZOLE SODIUM, VIA: HCPCS

## 2022-01-01 PROCEDURE — 99232 SBSQ HOSP IP/OBS MODERATE 35: CPT

## 2022-01-01 PROCEDURE — NC001 PR NO CHARGE: Performed by: RADIOLOGY

## 2022-01-01 PROCEDURE — 97167 OT EVAL HIGH COMPLEX 60 MIN: CPT

## 2022-01-01 PROCEDURE — 36569 INSJ PICC 5 YR+ W/O IMAGING: CPT

## 2022-01-01 PROCEDURE — 95700 EEG CONT REC W/VID EEG TECH: CPT

## 2022-01-01 PROCEDURE — 5A1955Z RESPIRATORY VENTILATION, GREATER THAN 96 CONSECUTIVE HOURS: ICD-10-PCS | Performed by: STUDENT IN AN ORGANIZED HEALTH CARE EDUCATION/TRAINING PROGRAM

## 2022-01-01 PROCEDURE — 81241 F5 GENE: CPT | Performed by: PSYCHIATRY & NEUROLOGY

## 2022-01-01 PROCEDURE — 85610 PROTHROMBIN TIME: CPT | Performed by: STUDENT IN AN ORGANIZED HEALTH CARE EDUCATION/TRAINING PROGRAM

## 2022-01-01 PROCEDURE — 92611 MOTION FLUOROSCOPY/SWALLOW: CPT

## 2022-01-01 PROCEDURE — 31500 INSERT EMERGENCY AIRWAY: CPT

## 2022-01-01 PROCEDURE — 0644T TCAT RMVL/DBLK ICAR MAS PERQ: CPT | Performed by: RADIOLOGY

## 2022-01-01 PROCEDURE — 85705 THROMBOPLASTIN INHIBITION: CPT | Performed by: PSYCHIATRY & NEUROLOGY

## 2022-01-01 PROCEDURE — 87081 CULTURE SCREEN ONLY: CPT

## 2022-01-01 PROCEDURE — 82330 ASSAY OF CALCIUM: CPT

## 2022-01-01 PROCEDURE — 83735 ASSAY OF MAGNESIUM: CPT | Performed by: PSYCHIATRY & NEUROLOGY

## 2022-01-01 PROCEDURE — C1751 CATH, INF, PER/CENT/MIDLINE: HCPCS

## 2022-01-01 PROCEDURE — 80202 ASSAY OF VANCOMYCIN: CPT | Performed by: STUDENT IN AN ORGANIZED HEALTH CARE EDUCATION/TRAINING PROGRAM

## 2022-01-01 PROCEDURE — 93321 DOPPLER ECHO F-UP/LMTD STD: CPT | Performed by: INTERNAL MEDICINE

## 2022-01-01 PROCEDURE — 83605 ASSAY OF LACTIC ACID: CPT | Performed by: STUDENT IN AN ORGANIZED HEALTH CARE EDUCATION/TRAINING PROGRAM

## 2022-01-01 PROCEDURE — 99232 SBSQ HOSP IP/OBS MODERATE 35: CPT | Performed by: PHYSICIAN ASSISTANT

## 2022-01-01 PROCEDURE — 84295 ASSAY OF SERUM SODIUM: CPT

## 2022-01-01 PROCEDURE — 99238 HOSP IP/OBS DSCHRG MGMT 30/<: CPT | Performed by: HOSPITALIST

## 2022-01-01 PROCEDURE — 85610 PROTHROMBIN TIME: CPT

## 2022-01-01 PROCEDURE — C1757 CATH, THROMBECTOMY/EMBOLECT: HCPCS

## 2022-01-01 PROCEDURE — 82330 ASSAY OF CALCIUM: CPT | Performed by: STUDENT IN AN ORGANIZED HEALTH CARE EDUCATION/TRAINING PROGRAM

## 2022-01-01 PROCEDURE — 51703 INSERT BLADDER CATH COMPLEX: CPT | Performed by: PHYSICIAN ASSISTANT

## 2022-01-01 PROCEDURE — 74230 X-RAY XM SWLNG FUNCJ C+: CPT

## 2022-01-01 PROCEDURE — 76937 US GUIDE VASCULAR ACCESS: CPT | Performed by: RADIOLOGY

## 2022-01-01 PROCEDURE — 86900 BLOOD TYPING SEROLOGIC ABO: CPT | Performed by: RADIOLOGY

## 2022-01-01 PROCEDURE — 85027 COMPLETE CBC AUTOMATED: CPT

## 2022-01-01 PROCEDURE — 85613 RUSSELL VIPER VENOM DILUTED: CPT | Performed by: PSYCHIATRY & NEUROLOGY

## 2022-01-01 PROCEDURE — 99449 NTRPROF PH1/NTRNET/EHR 31/>: CPT | Performed by: NEUROLOGICAL SURGERY

## 2022-01-01 PROCEDURE — 37184 PRIM ART M-THRMBC 1ST VSL: CPT | Performed by: INTERNAL MEDICINE

## 2022-01-01 PROCEDURE — 93308 TTE F-UP OR LMTD: CPT | Performed by: INTERNAL MEDICINE

## 2022-01-01 PROCEDURE — 0HBRXZZ EXCISION OF TOE NAIL, EXTERNAL APPROACH: ICD-10-PCS | Performed by: PODIATRIST

## 2022-01-01 PROCEDURE — 86901 BLOOD TYPING SEROLOGIC RH(D): CPT | Performed by: RADIOLOGY

## 2022-01-01 PROCEDURE — 85732 THROMBOPLASTIN TIME PARTIAL: CPT | Performed by: PSYCHIATRY & NEUROLOGY

## 2022-01-01 PROCEDURE — 02CR3ZZ EXTIRPATION OF MATTER FROM LEFT PULMONARY ARTERY, PERCUTANEOUS APPROACH: ICD-10-PCS | Performed by: RADIOLOGY

## 2022-01-01 PROCEDURE — 72141 MRI NECK SPINE W/O DYE: CPT

## 2022-01-01 PROCEDURE — NC001 PR NO CHARGE: Performed by: SURGERY

## 2022-01-01 PROCEDURE — B246ZZ4 ULTRASONOGRAPHY OF RIGHT AND LEFT HEART, TRANSESOPHAGEAL: ICD-10-PCS | Performed by: INTERNAL MEDICINE

## 2022-01-01 PROCEDURE — 97163 PT EVAL HIGH COMPLEX 45 MIN: CPT

## 2022-01-01 PROCEDURE — 95715 VEEG EA 12-26HR INTMT MNTR: CPT

## 2022-01-01 PROCEDURE — 99291 CRITICAL CARE FIRST HOUR: CPT | Performed by: PSYCHIATRY & NEUROLOGY

## 2022-01-01 PROCEDURE — 83036 HEMOGLOBIN GLYCOSYLATED A1C: CPT

## 2022-01-01 PROCEDURE — 99497 ADVNCD CARE PLAN 30 MIN: CPT | Performed by: INTERNAL MEDICINE

## 2022-01-01 PROCEDURE — 93308 TTE F-UP OR LMTD: CPT

## 2022-01-01 PROCEDURE — 36014 PLACE CATHETER IN ARTERY: CPT | Performed by: INTERNAL MEDICINE

## 2022-01-01 PROCEDURE — 87040 BLOOD CULTURE FOR BACTERIA: CPT | Performed by: INTERNAL MEDICINE

## 2022-01-01 PROCEDURE — 84132 ASSAY OF SERUM POTASSIUM: CPT

## 2022-01-01 PROCEDURE — 74177 CT ABD & PELVIS W/CONTRAST: CPT

## 2022-01-01 PROCEDURE — 87449 NOS EACH ORGANISM AG IA: CPT | Performed by: STUDENT IN AN ORGANIZED HEALTH CARE EDUCATION/TRAINING PROGRAM

## 2022-01-01 PROCEDURE — 95720 EEG PHY/QHP EA INCR W/VEEG: CPT | Performed by: PSYCHIATRY & NEUROLOGY

## 2022-01-01 PROCEDURE — 87205 SMEAR GRAM STAIN: CPT

## 2022-01-01 PROCEDURE — 94002 VENT MGMT INPAT INIT DAY: CPT

## 2022-01-01 PROCEDURE — 80053 COMPREHEN METABOLIC PANEL: CPT

## 2022-01-01 PROCEDURE — 85347 COAGULATION TIME ACTIVATED: CPT

## 2022-01-01 PROCEDURE — 37184 PRIM ART M-THRMBC 1ST VSL: CPT

## 2022-01-01 PROCEDURE — 03HY32Z INSERTION OF MONITORING DEVICE INTO UPPER ARTERY, PERCUTANEOUS APPROACH: ICD-10-PCS | Performed by: INTERNAL MEDICINE

## 2022-01-01 PROCEDURE — 92610 EVALUATE SWALLOWING FUNCTION: CPT

## 2022-01-01 PROCEDURE — 85305 CLOT INHIBIT PROT S TOTAL: CPT | Performed by: PSYCHIATRY & NEUROLOGY

## 2022-01-01 PROCEDURE — 02CQ3ZZ EXTIRPATION OF MATTER FROM RIGHT PULMONARY ARTERY, PERCUTANEOUS APPROACH: ICD-10-PCS | Performed by: RADIOLOGY

## 2022-01-01 PROCEDURE — 71260 CT THORAX DX C+: CPT

## 2022-01-01 PROCEDURE — B31SYZZ FLUOROSCOPY OF RIGHT PULMONARY ARTERY USING OTHER CONTRAST: ICD-10-PCS | Performed by: RADIOLOGY

## 2022-01-01 PROCEDURE — 99232 SBSQ HOSP IP/OBS MODERATE 35: CPT | Performed by: NEUROLOGICAL SURGERY

## 2022-01-01 PROCEDURE — 85670 THROMBIN TIME PLASMA: CPT | Performed by: PSYCHIATRY & NEUROLOGY

## 2022-01-01 PROCEDURE — 85018 HEMOGLOBIN: CPT

## 2022-01-01 PROCEDURE — 0BH17EZ INSERTION OF ENDOTRACHEAL AIRWAY INTO TRACHEA, VIA NATURAL OR ARTIFICIAL OPENING: ICD-10-PCS | Performed by: STUDENT IN AN ORGANIZED HEALTH CARE EDUCATION/TRAINING PROGRAM

## 2022-01-01 PROCEDURE — 86850 RBC ANTIBODY SCREEN: CPT | Performed by: RADIOLOGY

## 2022-01-01 PROCEDURE — 92950 HEART/LUNG RESUSCITATION CPR: CPT

## 2022-01-01 RX ORDER — ACETAMINOPHEN 325 MG/1
650 TABLET ORAL EVERY 6 HOURS PRN
Status: DISCONTINUED | OUTPATIENT
Start: 2022-01-01 | End: 2022-01-01

## 2022-01-01 RX ORDER — HALOPERIDOL 5 MG/ML
0.5 INJECTION INTRAMUSCULAR EVERY 2 HOUR PRN
Status: DISCONTINUED | OUTPATIENT
Start: 2022-01-01 | End: 2022-01-01 | Stop reason: HOSPADM

## 2022-01-01 RX ORDER — PANTOPRAZOLE SODIUM 40 MG/10ML
40 INJECTION, POWDER, LYOPHILIZED, FOR SOLUTION INTRAVENOUS
Status: DISCONTINUED | OUTPATIENT
Start: 2022-01-01 | End: 2022-01-01

## 2022-01-01 RX ORDER — FUROSEMIDE 40 MG/1
40 TABLET ORAL DAILY
Status: DISCONTINUED | OUTPATIENT
Start: 2022-01-01 | End: 2022-01-01

## 2022-01-01 RX ORDER — HYDROMORPHONE HCL/PF 1 MG/ML
0.5 SYRINGE (ML) INJECTION
Status: DISCONTINUED | OUTPATIENT
Start: 2022-01-01 | End: 2022-01-01 | Stop reason: HOSPADM

## 2022-01-01 RX ORDER — ACETAMINOPHEN 650 MG/1
325 SUPPOSITORY RECTAL EVERY 4 HOURS PRN
Status: DISCONTINUED | OUTPATIENT
Start: 2022-01-01 | End: 2022-01-01

## 2022-01-01 RX ORDER — LISINOPRIL 20 MG/1
20 TABLET ORAL DAILY
Status: DISCONTINUED | OUTPATIENT
Start: 2022-01-01 | End: 2022-01-01

## 2022-01-01 RX ORDER — AMLODIPINE BESYLATE 5 MG/1
5 TABLET ORAL DAILY
Status: DISCONTINUED | OUTPATIENT
Start: 2022-01-01 | End: 2022-01-01

## 2022-01-01 RX ORDER — AMOXICILLIN 250 MG
2 CAPSULE ORAL 2 TIMES DAILY
Status: DISCONTINUED | OUTPATIENT
Start: 2022-01-01 | End: 2022-01-01

## 2022-01-01 RX ORDER — HYDROMORPHONE HCL/PF 1 MG/ML
0.5 SYRINGE (ML) INJECTION
Status: DISCONTINUED | OUTPATIENT
Start: 2022-01-01 | End: 2022-01-01

## 2022-01-01 RX ORDER — BISACODYL 10 MG
10 SUPPOSITORY, RECTAL RECTAL DAILY PRN
Status: DISCONTINUED | OUTPATIENT
Start: 2022-01-01 | End: 2022-01-01 | Stop reason: HOSPADM

## 2022-01-01 RX ORDER — SODIUM CHLORIDE, SODIUM GLUCONATE, SODIUM ACETATE, POTASSIUM CHLORIDE, MAGNESIUM CHLORIDE, SODIUM PHOSPHATE, DIBASIC, AND POTASSIUM PHOSPHATE .53; .5; .37; .037; .03; .012; .00082 G/100ML; G/100ML; G/100ML; G/100ML; G/100ML; G/100ML; G/100ML
500 INJECTION, SOLUTION INTRAVENOUS ONCE
Status: COMPLETED | OUTPATIENT
Start: 2022-01-01 | End: 2022-01-01

## 2022-01-01 RX ORDER — GLYCOPYRROLATE 0.2 MG/ML
0.1 INJECTION INTRAMUSCULAR; INTRAVENOUS EVERY 4 HOURS PRN
Status: DISCONTINUED | OUTPATIENT
Start: 2022-01-01 | End: 2022-01-01 | Stop reason: HOSPADM

## 2022-01-01 RX ORDER — CITALOPRAM 10 MG/1
10 TABLET ORAL DAILY
Status: DISCONTINUED | OUTPATIENT
Start: 2022-01-01 | End: 2022-01-01

## 2022-01-01 RX ORDER — HEPARIN SODIUM 1000 [USP'U]/ML
2000 INJECTION, SOLUTION INTRAVENOUS; SUBCUTANEOUS
Status: DISCONTINUED | OUTPATIENT
Start: 2022-01-01 | End: 2022-01-01

## 2022-01-01 RX ORDER — INSULIN LISPRO 100 [IU]/ML
2-12 INJECTION, SOLUTION INTRAVENOUS; SUBCUTANEOUS EVERY 6 HOURS SCHEDULED
Status: DISCONTINUED | OUTPATIENT
Start: 2022-01-01 | End: 2022-01-01

## 2022-01-01 RX ORDER — DEXTROSE AND SODIUM CHLORIDE 5; .45 G/100ML; G/100ML
20 INJECTION, SOLUTION INTRAVENOUS CONTINUOUS
Status: DISPENSED | OUTPATIENT
Start: 2022-01-01 | End: 2022-01-01

## 2022-01-01 RX ORDER — OXYCODONE HYDROCHLORIDE 10 MG/1
10 TABLET ORAL EVERY 4 HOURS PRN
Status: DISCONTINUED | OUTPATIENT
Start: 2022-01-01 | End: 2022-01-01

## 2022-01-01 RX ORDER — ONDANSETRON 2 MG/ML
4 INJECTION INTRAMUSCULAR; INTRAVENOUS EVERY 4 HOURS PRN
Status: DISCONTINUED | OUTPATIENT
Start: 2022-01-01 | End: 2022-01-01 | Stop reason: HOSPADM

## 2022-01-01 RX ORDER — BUTALBITAL, ACETAMINOPHEN AND CAFFEINE 50; 325; 40 MG/1; MG/1; MG/1
1 TABLET ORAL EVERY 6 HOURS PRN
Status: DISCONTINUED | OUTPATIENT
Start: 2022-01-01 | End: 2022-01-01

## 2022-01-01 RX ORDER — EPINEPHRINE 1 MG/ML
INJECTION, SOLUTION, CONCENTRATE INTRAVENOUS
Status: COMPLETED
Start: 2022-01-01 | End: 2022-01-01

## 2022-01-01 RX ORDER — LORAZEPAM 2 MG/ML
0.5 INJECTION INTRAMUSCULAR
Status: DISCONTINUED | OUTPATIENT
Start: 2022-01-01 | End: 2022-01-01

## 2022-01-01 RX ORDER — FENTANYL CITRATE 50 UG/ML
100 INJECTION, SOLUTION INTRAMUSCULAR; INTRAVENOUS ONCE
Status: DISCONTINUED | OUTPATIENT
Start: 2022-01-01 | End: 2022-01-01

## 2022-01-01 RX ORDER — FENTANYL CITRATE 50 UG/ML
25 INJECTION, SOLUTION INTRAMUSCULAR; INTRAVENOUS ONCE AS NEEDED
Status: COMPLETED | OUTPATIENT
Start: 2022-01-01 | End: 2022-01-01

## 2022-01-01 RX ORDER — PROPOFOL 10 MG/ML
5-50 INJECTION, EMULSION INTRAVENOUS
Status: DISCONTINUED | OUTPATIENT
Start: 2022-01-01 | End: 2022-01-01

## 2022-01-01 RX ORDER — CARVEDILOL 12.5 MG/1
12.5 TABLET ORAL ONCE
Status: COMPLETED | OUTPATIENT
Start: 2022-01-01 | End: 2022-01-01

## 2022-01-01 RX ORDER — HEPARIN SODIUM 5000 [USP'U]/ML
5000 INJECTION, SOLUTION INTRAVENOUS; SUBCUTANEOUS EVERY 8 HOURS SCHEDULED
Status: DISCONTINUED | OUTPATIENT
Start: 2022-01-01 | End: 2022-01-01

## 2022-01-01 RX ORDER — DOXAZOSIN 2 MG/1
2 TABLET ORAL DAILY
Status: DISCONTINUED | OUTPATIENT
Start: 2022-01-01 | End: 2022-01-01

## 2022-01-01 RX ORDER — HEPARIN SODIUM 10000 [USP'U]/100ML
3-20 INJECTION, SOLUTION INTRAVENOUS
Status: DISCONTINUED | OUTPATIENT
Start: 2022-01-01 | End: 2022-01-01

## 2022-01-01 RX ORDER — ASPIRIN 81 MG/1
81 TABLET ORAL DAILY
Status: DISCONTINUED | OUTPATIENT
Start: 2022-01-01 | End: 2022-01-01

## 2022-01-01 RX ORDER — LIDOCAINE HYDROCHLORIDE 10 MG/ML
INJECTION, SOLUTION EPIDURAL; INFILTRATION; INTRACAUDAL; PERINEURAL CODE/TRAUMA/SEDATION MEDICATION
Status: COMPLETED | OUTPATIENT
Start: 2022-01-01 | End: 2022-01-01

## 2022-01-01 RX ORDER — ACETAMINOPHEN 160 MG/5ML
650 SUSPENSION, ORAL (FINAL DOSE FORM) ORAL EVERY 6 HOURS PRN
Status: DISCONTINUED | OUTPATIENT
Start: 2022-01-01 | End: 2022-01-01

## 2022-01-01 RX ORDER — HYDROMORPHONE HCL/PF 1 MG/ML
1 SYRINGE (ML) INJECTION
Status: DISCONTINUED | OUTPATIENT
Start: 2022-01-01 | End: 2022-01-01

## 2022-01-01 RX ORDER — HYDROMORPHONE HCL/PF 1 MG/ML
0.3 SYRINGE (ML) INJECTION EVERY 2 HOUR PRN
Status: DISCONTINUED | OUTPATIENT
Start: 2022-01-01 | End: 2022-01-01

## 2022-01-01 RX ORDER — INSULIN LISPRO 100 [IU]/ML
6 INJECTION, SOLUTION INTRAVENOUS; SUBCUTANEOUS EVERY 6 HOURS
Status: DISCONTINUED | OUTPATIENT
Start: 2022-01-01 | End: 2022-01-01

## 2022-01-01 RX ORDER — INSULIN LISPRO 100 [IU]/ML
2-12 INJECTION, SOLUTION INTRAVENOUS; SUBCUTANEOUS
Status: DISCONTINUED | OUTPATIENT
Start: 2022-01-01 | End: 2022-01-01

## 2022-01-01 RX ORDER — AMLODIPINE BESYLATE 10 MG/1
10 TABLET ORAL DAILY
Status: DISCONTINUED | OUTPATIENT
Start: 2022-01-01 | End: 2022-01-01

## 2022-01-01 RX ORDER — SODIUM BICARBONATE 84 MG/ML
INJECTION, SOLUTION INTRAVENOUS CODE/TRAUMA/SEDATION MEDICATION
Status: COMPLETED | OUTPATIENT
Start: 2022-01-01 | End: 2022-01-01

## 2022-01-01 RX ORDER — POTASSIUM CHLORIDE 20 MEQ/1
20 TABLET, EXTENDED RELEASE ORAL ONCE
Status: DISCONTINUED | OUTPATIENT
Start: 2022-01-01 | End: 2022-01-01

## 2022-01-01 RX ORDER — CARVEDILOL 12.5 MG/1
12.5 TABLET ORAL 2 TIMES DAILY WITH MEALS
Status: DISCONTINUED | OUTPATIENT
Start: 2022-01-01 | End: 2022-01-01

## 2022-01-01 RX ORDER — HYDRALAZINE HYDROCHLORIDE 20 MG/ML
10 INJECTION INTRAMUSCULAR; INTRAVENOUS EVERY 4 HOURS PRN
Status: DISCONTINUED | OUTPATIENT
Start: 2022-01-01 | End: 2022-01-01

## 2022-01-01 RX ORDER — ATORVASTATIN CALCIUM 40 MG/1
40 TABLET, FILM COATED ORAL
Status: DISCONTINUED | OUTPATIENT
Start: 2022-01-01 | End: 2022-01-01

## 2022-01-01 RX ORDER — POTASSIUM CHLORIDE 29.8 MG/ML
40 INJECTION INTRAVENOUS ONCE
Status: DISCONTINUED | OUTPATIENT
Start: 2022-01-01 | End: 2022-01-01

## 2022-01-01 RX ORDER — CALCIUM GLUCONATE 20 MG/ML
2 INJECTION, SOLUTION INTRAVENOUS ONCE
Status: COMPLETED | OUTPATIENT
Start: 2022-01-01 | End: 2022-01-01

## 2022-01-01 RX ORDER — FENTANYL CITRATE 50 UG/ML
INJECTION, SOLUTION INTRAMUSCULAR; INTRAVENOUS
Status: DISCONTINUED
Start: 2022-01-01 | End: 2022-01-01 | Stop reason: WASHOUT

## 2022-01-01 RX ORDER — HYDROMORPHONE HCL/PF 1 MG/ML
1 SYRINGE (ML) INJECTION ONCE
Status: COMPLETED | OUTPATIENT
Start: 2022-01-01 | End: 2022-01-01

## 2022-01-01 RX ORDER — POTASSIUM CHLORIDE 29.8 MG/ML
40 INJECTION INTRAVENOUS ONCE
Status: COMPLETED | OUTPATIENT
Start: 2022-01-01 | End: 2022-01-01

## 2022-01-01 RX ORDER — DOXAZOSIN 2 MG/1
2 TABLET ORAL DAILY
Status: COMPLETED | OUTPATIENT
Start: 2022-01-01 | End: 2022-01-01

## 2022-01-01 RX ORDER — LABETALOL HYDROCHLORIDE 5 MG/ML
10 INJECTION, SOLUTION INTRAVENOUS ONCE
Status: COMPLETED | OUTPATIENT
Start: 2022-01-01 | End: 2022-01-01

## 2022-01-01 RX ORDER — POLYETHYLENE GLYCOL 3350 17 G/17G
17 POWDER, FOR SOLUTION ORAL DAILY
Status: DISCONTINUED | OUTPATIENT
Start: 2022-01-01 | End: 2022-01-01

## 2022-01-01 RX ORDER — ACETAMINOPHEN 160 MG/5ML
650 SUSPENSION, ORAL (FINAL DOSE FORM) ORAL ONCE
Status: DISCONTINUED | OUTPATIENT
Start: 2022-01-01 | End: 2022-01-01

## 2022-01-01 RX ORDER — HEPARIN SODIUM 5000 [USP'U]/ML
5000 INJECTION, SOLUTION INTRAVENOUS; SUBCUTANEOUS EVERY 8 HOURS SCHEDULED
Status: DISCONTINUED | OUTPATIENT
Start: 2022-01-01 | End: 2022-01-01 | Stop reason: ALTCHOICE

## 2022-01-01 RX ORDER — LISINOPRIL 20 MG/1
40 TABLET ORAL DAILY
Status: DISCONTINUED | OUTPATIENT
Start: 2022-01-01 | End: 2022-01-01

## 2022-01-01 RX ORDER — POTASSIUM CHLORIDE 20MEQ/15ML
40 LIQUID (ML) ORAL ONCE
Status: COMPLETED | OUTPATIENT
Start: 2022-01-01 | End: 2022-01-01

## 2022-01-01 RX ORDER — CHLORHEXIDINE GLUCONATE 0.12 MG/ML
15 RINSE ORAL EVERY 12 HOURS SCHEDULED
Status: DISCONTINUED | OUTPATIENT
Start: 2022-01-01 | End: 2022-01-01

## 2022-01-01 RX ORDER — MODAFINIL 100 MG/1
100 TABLET ORAL DAILY
Status: DISCONTINUED | OUTPATIENT
Start: 2022-01-01 | End: 2022-01-01

## 2022-01-01 RX ORDER — CHLORTHALIDONE 25 MG/1
25 TABLET ORAL DAILY
Status: DISCONTINUED | OUTPATIENT
Start: 2022-01-01 | End: 2022-01-01

## 2022-01-01 RX ORDER — IODIXANOL 320 MG/ML
100 INJECTION, SOLUTION INTRAVASCULAR
Status: COMPLETED | OUTPATIENT
Start: 2022-01-01 | End: 2022-01-01

## 2022-01-01 RX ORDER — CALCIUM CHLORIDE 100 MG/ML
SYRINGE (ML) INTRAVENOUS CODE/TRAUMA/SEDATION MEDICATION
Status: COMPLETED | OUTPATIENT
Start: 2022-01-01 | End: 2022-01-01

## 2022-01-01 RX ORDER — HYDROMORPHONE HCL IN WATER/PF 6 MG/30 ML
0.2 PATIENT CONTROLLED ANALGESIA SYRINGE INTRAVENOUS
Status: DISCONTINUED | OUTPATIENT
Start: 2022-01-01 | End: 2022-01-01

## 2022-01-01 RX ORDER — AMLODIPINE BESYLATE 5 MG/1
5 TABLET ORAL ONCE
Status: COMPLETED | OUTPATIENT
Start: 2022-01-01 | End: 2022-01-01

## 2022-01-01 RX ORDER — INSULIN LISPRO 100 [IU]/ML
1-6 INJECTION, SOLUTION INTRAVENOUS; SUBCUTANEOUS EVERY 6 HOURS SCHEDULED
Status: DISCONTINUED | OUTPATIENT
Start: 2022-01-01 | End: 2022-01-01

## 2022-01-01 RX ORDER — INSULIN LISPRO 100 [IU]/ML
4-20 INJECTION, SOLUTION INTRAVENOUS; SUBCUTANEOUS EVERY 6 HOURS SCHEDULED
Status: DISCONTINUED | OUTPATIENT
Start: 2022-01-01 | End: 2022-01-01

## 2022-01-01 RX ORDER — LABETALOL HYDROCHLORIDE 5 MG/ML
20 INJECTION, SOLUTION INTRAVENOUS ONCE
Status: COMPLETED | OUTPATIENT
Start: 2022-01-01 | End: 2022-01-01

## 2022-01-01 RX ORDER — LORAZEPAM 2 MG/ML
1 INJECTION INTRAMUSCULAR
Status: DISCONTINUED | OUTPATIENT
Start: 2022-01-01 | End: 2022-01-01 | Stop reason: HOSPADM

## 2022-01-01 RX ORDER — ACETAMINOPHEN 160 MG/5ML
650 SUSPENSION, ORAL (FINAL DOSE FORM) ORAL EVERY 6 HOURS
Status: DISCONTINUED | OUTPATIENT
Start: 2022-01-01 | End: 2022-01-01

## 2022-01-01 RX ORDER — FENTANYL CITRATE 50 UG/ML
50 INJECTION, SOLUTION INTRAMUSCULAR; INTRAVENOUS ONCE
Status: DISCONTINUED | OUTPATIENT
Start: 2022-01-01 | End: 2022-01-01

## 2022-01-01 RX ORDER — CARVEDILOL 6.25 MG/1
6.25 TABLET ORAL 2 TIMES DAILY WITH MEALS
Status: DISCONTINUED | OUTPATIENT
Start: 2022-01-01 | End: 2022-01-01

## 2022-01-01 RX ORDER — LISINOPRIL 20 MG/1
20 TABLET ORAL DAILY
Status: COMPLETED | OUTPATIENT
Start: 2022-01-01 | End: 2022-01-01

## 2022-01-01 RX ORDER — OXYCODONE HYDROCHLORIDE 5 MG/1
7.5 TABLET ORAL EVERY 4 HOURS PRN
Status: DISCONTINUED | OUTPATIENT
Start: 2022-01-01 | End: 2022-01-01

## 2022-01-01 RX ORDER — SODIUM CHLORIDE 9 MG/ML
100 INJECTION, SOLUTION INTRAVENOUS CONTINUOUS
Status: DISCONTINUED | OUTPATIENT
Start: 2022-01-01 | End: 2022-01-01

## 2022-01-01 RX ORDER — VANCOMYCIN HYDROCHLORIDE 1 G/200ML
15 INJECTION, SOLUTION INTRAVENOUS DAILY PRN
Status: DISCONTINUED | OUTPATIENT
Start: 2022-01-01 | End: 2022-01-01

## 2022-01-01 RX ORDER — SODIUM CHLORIDE, SODIUM GLUCONATE, SODIUM ACETATE, POTASSIUM CHLORIDE, MAGNESIUM CHLORIDE, SODIUM PHOSPHATE, DIBASIC, AND POTASSIUM PHOSPHATE .53; .5; .37; .037; .03; .012; .00082 G/100ML; G/100ML; G/100ML; G/100ML; G/100ML; G/100ML; G/100ML
1000 INJECTION, SOLUTION INTRAVENOUS ONCE
Status: COMPLETED | OUTPATIENT
Start: 2022-01-01 | End: 2022-01-01

## 2022-01-01 RX ORDER — HEPARIN SODIUM 1000 [USP'U]/ML
4000 INJECTION, SOLUTION INTRAVENOUS; SUBCUTANEOUS
Status: DISCONTINUED | OUTPATIENT
Start: 2022-01-01 | End: 2022-01-01

## 2022-01-01 RX ORDER — POTASSIUM CHLORIDE 14.9 MG/ML
20 INJECTION INTRAVENOUS ONCE
Status: DISCONTINUED | OUTPATIENT
Start: 2022-01-01 | End: 2022-01-01

## 2022-01-01 RX ORDER — ONDANSETRON 2 MG/ML
4 INJECTION INTRAMUSCULAR; INTRAVENOUS EVERY 6 HOURS PRN
Status: DISCONTINUED | OUTPATIENT
Start: 2022-01-01 | End: 2022-01-01

## 2022-01-01 RX ORDER — INSULIN LISPRO 100 [IU]/ML
1-5 INJECTION, SOLUTION INTRAVENOUS; SUBCUTANEOUS EVERY 6 HOURS SCHEDULED
Status: DISCONTINUED | OUTPATIENT
Start: 2022-01-01 | End: 2022-01-01

## 2022-01-01 RX ORDER — LABETALOL HYDROCHLORIDE 5 MG/ML
20 INJECTION, SOLUTION INTRAVENOUS EVERY 4 HOURS PRN
Status: DISCONTINUED | OUTPATIENT
Start: 2022-01-01 | End: 2022-01-01

## 2022-01-01 RX ORDER — INSULIN LISPRO 100 [IU]/ML
1-6 INJECTION, SOLUTION INTRAVENOUS; SUBCUTANEOUS
Status: DISCONTINUED | OUTPATIENT
Start: 2022-01-01 | End: 2022-01-01

## 2022-01-01 RX ORDER — LABETALOL HYDROCHLORIDE 5 MG/ML
10 INJECTION, SOLUTION INTRAVENOUS EVERY 4 HOURS PRN
Status: DISCONTINUED | OUTPATIENT
Start: 2022-01-01 | End: 2022-01-01

## 2022-01-01 RX ORDER — ALBUMIN, HUMAN INJ 5% 5 %
25 SOLUTION INTRAVENOUS ONCE
Status: COMPLETED | OUTPATIENT
Start: 2022-01-01 | End: 2022-01-01

## 2022-01-01 RX ORDER — LISINOPRIL 10 MG/1
10 TABLET ORAL DAILY
Status: DISCONTINUED | OUTPATIENT
Start: 2022-01-01 | End: 2022-01-01

## 2022-01-01 RX ORDER — FENTANYL CITRATE 50 UG/ML
25 INJECTION, SOLUTION INTRAMUSCULAR; INTRAVENOUS EVERY 2 HOUR PRN
Status: DISCONTINUED | OUTPATIENT
Start: 2022-01-01 | End: 2022-01-01

## 2022-01-01 RX ORDER — INSULIN GLARGINE 100 [IU]/ML
20 INJECTION, SOLUTION SUBCUTANEOUS
Status: DISCONTINUED | OUTPATIENT
Start: 2022-01-01 | End: 2022-01-01

## 2022-01-01 RX ORDER — POTASSIUM CHLORIDE 20 MEQ/1
40 TABLET, EXTENDED RELEASE ORAL ONCE
Status: COMPLETED | OUTPATIENT
Start: 2022-01-01 | End: 2022-01-01

## 2022-01-01 RX ORDER — CARVEDILOL 3.12 MG/1
3.12 TABLET ORAL 2 TIMES DAILY WITH MEALS
Status: DISCONTINUED | OUTPATIENT
Start: 2022-01-01 | End: 2022-01-01

## 2022-01-01 RX ORDER — LISINOPRIL 10 MG/1
10 TABLET ORAL DAILY
Status: COMPLETED | OUTPATIENT
Start: 2022-01-01 | End: 2022-01-01

## 2022-01-01 RX ORDER — INSULIN GLARGINE 100 [IU]/ML
20 INJECTION, SOLUTION SUBCUTANEOUS EVERY MORNING
Status: DISCONTINUED | OUTPATIENT
Start: 2022-01-01 | End: 2022-01-01

## 2022-01-01 RX ORDER — NOREPINEPHRINE BITARTRATE 1 MG/ML
INJECTION, SOLUTION INTRAVENOUS
Status: DISPENSED
Start: 2022-01-01 | End: 2022-01-01

## 2022-01-01 RX ORDER — SODIUM CHLORIDE 3 G/100ML
250 INJECTION, SOLUTION INTRAVENOUS ONCE
Status: DISCONTINUED | OUTPATIENT
Start: 2022-01-01 | End: 2022-01-01

## 2022-01-01 RX ORDER — EPINEPHRINE 0.1 MG/ML
SYRINGE (ML) INJECTION CODE/TRAUMA/SEDATION MEDICATION
Status: COMPLETED | OUTPATIENT
Start: 2022-01-01 | End: 2022-01-01

## 2022-01-01 RX ORDER — NICARDIPINE HYDROCHLORIDE 2.5 MG/ML
INJECTION INTRAVENOUS
Status: DISPENSED
Start: 2022-01-01 | End: 2022-01-01

## 2022-01-01 RX ORDER — POTASSIUM CHLORIDE 20MEQ/15ML
20 LIQUID (ML) ORAL ONCE
Status: COMPLETED | OUTPATIENT
Start: 2022-01-01 | End: 2022-01-01

## 2022-01-01 RX ORDER — FENTANYL CITRATE 50 UG/ML
25 INJECTION, SOLUTION INTRAMUSCULAR; INTRAVENOUS ONCE
Status: DISCONTINUED | OUTPATIENT
Start: 2022-01-01 | End: 2022-01-01

## 2022-01-01 RX ORDER — OXYCODONE HYDROCHLORIDE 5 MG/1
5 TABLET ORAL EVERY 4 HOURS PRN
Status: DISCONTINUED | OUTPATIENT
Start: 2022-01-01 | End: 2022-01-01

## 2022-01-01 RX ORDER — ACETAMINOPHEN 160 MG/5ML
975 SUSPENSION, ORAL (FINAL DOSE FORM) ORAL EVERY 8 HOURS PRN
Status: DISCONTINUED | OUTPATIENT
Start: 2022-01-01 | End: 2022-01-01

## 2022-01-01 RX ADMIN — AMLODIPINE BESYLATE 5 MG: 5 TABLET ORAL at 11:32

## 2022-01-01 RX ADMIN — CALCIUM GLUCONATE 2 G: 20 INJECTION, SOLUTION INTRAVENOUS at 22:05

## 2022-01-01 RX ADMIN — LORAZEPAM 1 MG: 2 INJECTION INTRAMUSCULAR; INTRAVENOUS at 06:59

## 2022-01-01 RX ADMIN — HYDROMORPHONE HYDROCHLORIDE 0.5 MG: 1 INJECTION, SOLUTION INTRAMUSCULAR; INTRAVENOUS; SUBCUTANEOUS at 04:03

## 2022-01-01 RX ADMIN — HYDROMORPHONE HYDROCHLORIDE 0.5 MG: 1 INJECTION, SOLUTION INTRAMUSCULAR; INTRAVENOUS; SUBCUTANEOUS at 07:51

## 2022-01-01 RX ADMIN — CHLORTHALIDONE 25 MG: 25 TABLET ORAL at 08:37

## 2022-01-01 RX ADMIN — EPINEPHRINE 1 MG: 0.1 INJECTION INTRACARDIAC; INTRAVENOUS at 10:38

## 2022-01-01 RX ADMIN — CEFTRIAXONE 2000 MG: 2 INJECTION, POWDER, FOR SOLUTION INTRAMUSCULAR; INTRAVENOUS at 13:44

## 2022-01-01 RX ADMIN — SODIUM CHLORIDE 4 MG/HR: 9 INJECTION INTRAMUSCULAR; INTRAVENOUS; SUBCUTANEOUS at 22:19

## 2022-01-01 RX ADMIN — HALOPERIDOL LACTATE 0.5 MG: 5 INJECTION, SOLUTION INTRAMUSCULAR at 04:03

## 2022-01-01 RX ADMIN — BUTALBITAL, ACETAMINOPHEN, AND CAFFEINE 1 TABLET: 50; 325; 40 TABLET ORAL at 16:50

## 2022-01-01 RX ADMIN — HYDROMORPHONE HYDROCHLORIDE 0.2 MG: 0.2 INJECTION, SOLUTION INTRAMUSCULAR; INTRAVENOUS; SUBCUTANEOUS at 14:28

## 2022-01-01 RX ADMIN — PROPOFOL 20 MCG/KG/MIN: 10 INJECTION, EMULSION INTRAVENOUS at 21:20

## 2022-01-01 RX ADMIN — LORAZEPAM 1 MG: 2 INJECTION INTRAMUSCULAR; INTRAVENOUS at 06:09

## 2022-01-01 RX ADMIN — CHLORHEXIDINE GLUCONATE 15 ML: 1.2 SOLUTION ORAL at 09:38

## 2022-01-01 RX ADMIN — HYDROMORPHONE HYDROCHLORIDE 0.5 MG: 1 INJECTION, SOLUTION INTRAMUSCULAR; INTRAVENOUS; SUBCUTANEOUS at 01:42

## 2022-01-01 RX ADMIN — HYDROMORPHONE HYDROCHLORIDE 0.5 MG: 1 INJECTION, SOLUTION INTRAMUSCULAR; INTRAVENOUS; SUBCUTANEOUS at 08:54

## 2022-01-01 RX ADMIN — HEPARIN SODIUM 5000 UNITS: 5000 INJECTION INTRAVENOUS; SUBCUTANEOUS at 14:12

## 2022-01-01 RX ADMIN — INSULIN LISPRO 3 UNITS: 100 INJECTION, SOLUTION INTRAVENOUS; SUBCUTANEOUS at 00:30

## 2022-01-01 RX ADMIN — HYDROMORPHONE HYDROCHLORIDE 0.5 MG: 1 INJECTION, SOLUTION INTRAMUSCULAR; INTRAVENOUS; SUBCUTANEOUS at 06:07

## 2022-01-01 RX ADMIN — INSULIN LISPRO 16 UNITS: 100 INJECTION, SOLUTION INTRAVENOUS; SUBCUTANEOUS at 16:36

## 2022-01-01 RX ADMIN — GLYCERIN 1 DROP: .002; .002; .01 SOLUTION/ DROPS OPHTHALMIC at 11:46

## 2022-01-01 RX ADMIN — HYDROMORPHONE HYDROCHLORIDE 0.5 MG: 1 INJECTION, SOLUTION INTRAMUSCULAR; INTRAVENOUS; SUBCUTANEOUS at 10:19

## 2022-01-01 RX ADMIN — SODIUM CHLORIDE 2.5 MG/HR: 0.9 INJECTION, SOLUTION INTRAVENOUS at 11:55

## 2022-01-01 RX ADMIN — INSULIN GLARGINE 20 UNITS: 100 INJECTION, SOLUTION SUBCUTANEOUS at 08:40

## 2022-01-01 RX ADMIN — MORPHINE SULFATE 2 MG: 2 INJECTION, SOLUTION INTRAMUSCULAR; INTRAVENOUS at 14:36

## 2022-01-01 RX ADMIN — HYDROMORPHONE HYDROCHLORIDE 0.5 MG: 1 INJECTION, SOLUTION INTRAMUSCULAR; INTRAVENOUS; SUBCUTANEOUS at 20:12

## 2022-01-01 RX ADMIN — LISINOPRIL 40 MG: 20 TABLET ORAL at 17:17

## 2022-01-01 RX ADMIN — CEFTRIAXONE 2000 MG: 2 INJECTION, POWDER, FOR SOLUTION INTRAMUSCULAR; INTRAVENOUS at 14:38

## 2022-01-01 RX ADMIN — LORAZEPAM 1 MG: 2 INJECTION INTRAMUSCULAR; INTRAVENOUS at 09:33

## 2022-01-01 RX ADMIN — LABETALOL HYDROCHLORIDE 20 MG: 5 INJECTION, SOLUTION INTRAVENOUS at 03:04

## 2022-01-01 RX ADMIN — INSULIN LISPRO 2 UNITS: 100 INJECTION, SOLUTION INTRAVENOUS; SUBCUTANEOUS at 18:31

## 2022-01-01 RX ADMIN — SODIUM CHLORIDE 1 MG/HR: 0.9 INJECTION, SOLUTION INTRAVENOUS at 01:06

## 2022-01-01 RX ADMIN — SENNOSIDES, DOCUSATE SODIUM 2 TABLET: 8.6; 5 TABLET ORAL at 08:35

## 2022-01-01 RX ADMIN — INSULIN LISPRO 2 UNITS: 100 INJECTION, SOLUTION INTRAVENOUS; SUBCUTANEOUS at 11:36

## 2022-01-01 RX ADMIN — SODIUM CHLORIDE 4 MG/HR: 9 INJECTION INTRAMUSCULAR; INTRAVENOUS; SUBCUTANEOUS at 01:18

## 2022-01-01 RX ADMIN — HYDROMORPHONE HYDROCHLORIDE 0.5 MG: 1 INJECTION, SOLUTION INTRAMUSCULAR; INTRAVENOUS; SUBCUTANEOUS at 04:47

## 2022-01-01 RX ADMIN — LORAZEPAM 1 MG: 2 INJECTION INTRAMUSCULAR; INTRAVENOUS at 16:29

## 2022-01-01 RX ADMIN — LISINOPRIL 40 MG: 20 TABLET ORAL at 16:52

## 2022-01-01 RX ADMIN — AMLODIPINE BESYLATE 5 MG: 5 TABLET ORAL at 10:58

## 2022-01-01 RX ADMIN — PANTOPRAZOLE SODIUM 40 MG: 40 INJECTION, POWDER, FOR SOLUTION INTRAVENOUS at 11:37

## 2022-01-01 RX ADMIN — INSULIN LISPRO 6 UNITS: 100 INJECTION, SOLUTION INTRAVENOUS; SUBCUTANEOUS at 23:54

## 2022-01-01 RX ADMIN — AMLODIPINE BESYLATE 10 MG: 10 TABLET ORAL at 10:01

## 2022-01-01 RX ADMIN — HYDROMORPHONE HYDROCHLORIDE 0.5 MG: 1 INJECTION, SOLUTION INTRAMUSCULAR; INTRAVENOUS; SUBCUTANEOUS at 18:36

## 2022-01-01 RX ADMIN — OXYCODONE HYDROCHLORIDE 5 MG: 5 TABLET ORAL at 21:01

## 2022-01-01 RX ADMIN — LORAZEPAM 1 MG: 2 INJECTION INTRAMUSCULAR; INTRAVENOUS at 22:54

## 2022-01-01 RX ADMIN — SENNOSIDES, DOCUSATE SODIUM 2 TABLET: 8.6; 5 TABLET ORAL at 08:50

## 2022-01-01 RX ADMIN — LIDOCAINE HYDROCHLORIDE 10 ML: 10 INJECTION, SOLUTION EPIDURAL; INFILTRATION; INTRACAUDAL; PERINEURAL at 19:08

## 2022-01-01 RX ADMIN — LORAZEPAM 1 MG: 2 INJECTION INTRAMUSCULAR; INTRAVENOUS at 11:56

## 2022-01-01 RX ADMIN — CARVEDILOL 3.12 MG: 3.12 TABLET, FILM COATED ORAL at 14:17

## 2022-01-01 RX ADMIN — AMLODIPINE BESYLATE 10 MG: 10 TABLET ORAL at 08:50

## 2022-01-01 RX ADMIN — ASPIRIN 81 MG: 81 TABLET, COATED ORAL at 11:37

## 2022-01-01 RX ADMIN — SODIUM CHLORIDE 100 ML/HR: 0.9 INJECTION, SOLUTION INTRAVENOUS at 00:55

## 2022-01-01 RX ADMIN — CARVEDILOL 6.25 MG: 6.25 TABLET, FILM COATED ORAL at 15:58

## 2022-01-01 RX ADMIN — INSULIN LISPRO 4 UNITS: 100 INJECTION, SOLUTION INTRAVENOUS; SUBCUTANEOUS at 11:43

## 2022-01-01 RX ADMIN — POLYETHYLENE GLYCOL 3350 17 G: 17 POWDER, FOR SOLUTION ORAL at 08:06

## 2022-01-01 RX ADMIN — POTASSIUM CHLORIDE 40 MEQ: 29.8 INJECTION, SOLUTION INTRAVENOUS at 10:00

## 2022-01-01 RX ADMIN — INSULIN LISPRO 4 UNITS: 100 INJECTION, SOLUTION INTRAVENOUS; SUBCUTANEOUS at 17:45

## 2022-01-01 RX ADMIN — HYDROMORPHONE HYDROCHLORIDE 0.5 MG: 1 INJECTION, SOLUTION INTRAMUSCULAR; INTRAVENOUS; SUBCUTANEOUS at 17:16

## 2022-01-01 RX ADMIN — DEXTROSE AND SODIUM CHLORIDE 20 ML/HR: 5; .45 INJECTION, SOLUTION INTRAVENOUS at 14:46

## 2022-01-01 RX ADMIN — CHLORHEXIDINE GLUCONATE 15 ML: 1.2 SOLUTION ORAL at 21:52

## 2022-01-01 RX ADMIN — ACETAMINOPHEN 975 MG: 650 SUSPENSION ORAL at 14:03

## 2022-01-01 RX ADMIN — OXYCODONE HYDROCHLORIDE 5 MG: 5 TABLET ORAL at 12:49

## 2022-01-01 RX ADMIN — HYDROMORPHONE HYDROCHLORIDE 0.5 MG: 1 INJECTION, SOLUTION INTRAMUSCULAR; INTRAVENOUS; SUBCUTANEOUS at 18:15

## 2022-01-01 RX ADMIN — CHLORHEXIDINE GLUCONATE 15 ML: 1.2 SOLUTION ORAL at 08:35

## 2022-01-01 RX ADMIN — SODIUM CHLORIDE 6 UNITS/HR: 9 INJECTION, SOLUTION INTRAVENOUS at 01:12

## 2022-01-01 RX ADMIN — AMLODIPINE BESYLATE 5 MG: 5 TABLET ORAL at 08:12

## 2022-01-01 RX ADMIN — SENNOSIDES, DOCUSATE SODIUM 2 TABLET: 8.6; 5 TABLET ORAL at 09:38

## 2022-01-01 RX ADMIN — HYDROMORPHONE HYDROCHLORIDE 0.5 MG: 1 INJECTION, SOLUTION INTRAMUSCULAR; INTRAVENOUS; SUBCUTANEOUS at 11:32

## 2022-01-01 RX ADMIN — SENNOSIDES, DOCUSATE SODIUM 2 TABLET: 8.6; 5 TABLET ORAL at 10:01

## 2022-01-01 RX ADMIN — HYDROMORPHONE HYDROCHLORIDE 1 MG: 1 INJECTION, SOLUTION INTRAMUSCULAR; INTRAVENOUS; SUBCUTANEOUS at 14:46

## 2022-01-01 RX ADMIN — CHLORTHALIDONE 25 MG: 25 TABLET ORAL at 09:02

## 2022-01-01 RX ADMIN — INSULIN GLARGINE 20 UNITS: 100 INJECTION, SOLUTION SUBCUTANEOUS at 08:34

## 2022-01-01 RX ADMIN — ACETAMINOPHEN 650 MG: 650 SUSPENSION ORAL at 22:58

## 2022-01-01 RX ADMIN — ATORVASTATIN CALCIUM 40 MG: 40 TABLET, FILM COATED ORAL at 17:22

## 2022-01-01 RX ADMIN — MODAFINIL 100 MG: 100 TABLET ORAL at 08:14

## 2022-01-01 RX ADMIN — GLYCOPYRROLATE 0.1 MG: 0.2 INJECTION INTRAMUSCULAR; INTRAVENOUS at 19:56

## 2022-01-01 RX ADMIN — LABETALOL HYDROCHLORIDE 10 MG: 5 INJECTION, SOLUTION INTRAVENOUS at 21:50

## 2022-01-01 RX ADMIN — NOREPINEPHRINE BITARTRATE 5 MCG/MIN: 1 INJECTION INTRAVENOUS at 15:28

## 2022-01-01 RX ADMIN — HYDROMORPHONE HYDROCHLORIDE 0.5 MG: 1 INJECTION, SOLUTION INTRAMUSCULAR; INTRAVENOUS; SUBCUTANEOUS at 19:34

## 2022-01-01 RX ADMIN — SENNOSIDES, DOCUSATE SODIUM 2 TABLET: 8.6; 5 TABLET ORAL at 08:10

## 2022-01-01 RX ADMIN — HYDROMORPHONE HYDROCHLORIDE 1 MG: 1 INJECTION, SOLUTION INTRAMUSCULAR; INTRAVENOUS; SUBCUTANEOUS at 13:02

## 2022-01-01 RX ADMIN — CITALOPRAM HYDROBROMIDE 10 MG: 10 TABLET ORAL at 08:10

## 2022-01-01 RX ADMIN — AMLODIPINE BESYLATE 5 MG: 5 TABLET ORAL at 09:38

## 2022-01-01 RX ADMIN — LABETALOL HYDROCHLORIDE 10 MG: 5 INJECTION, SOLUTION INTRAVENOUS at 13:29

## 2022-01-01 RX ADMIN — BISACODYL 10 MG: 10 SUPPOSITORY RECTAL at 11:46

## 2022-01-01 RX ADMIN — CARVEDILOL 12.5 MG: 12.5 TABLET, FILM COATED ORAL at 10:01

## 2022-01-01 RX ADMIN — ACETAMINOPHEN 650 MG: 650 SUSPENSION ORAL at 04:06

## 2022-01-01 RX ADMIN — LABETALOL HYDROCHLORIDE 10 MG: 5 INJECTION, SOLUTION INTRAVENOUS at 15:28

## 2022-01-01 RX ADMIN — ACETAMINOPHEN 650 MG: 650 SUSPENSION ORAL at 10:33

## 2022-01-01 RX ADMIN — SENNOSIDES, DOCUSATE SODIUM 2 TABLET: 8.6; 5 TABLET ORAL at 08:12

## 2022-01-01 RX ADMIN — INSULIN LISPRO 2 UNITS: 100 INJECTION, SOLUTION INTRAVENOUS; SUBCUTANEOUS at 12:17

## 2022-01-01 RX ADMIN — ATORVASTATIN CALCIUM 40 MG: 40 TABLET, FILM COATED ORAL at 17:03

## 2022-01-01 RX ADMIN — HYDROMORPHONE HYDROCHLORIDE 0.5 MG: 1 INJECTION, SOLUTION INTRAMUSCULAR; INTRAVENOUS; SUBCUTANEOUS at 16:14

## 2022-01-01 RX ADMIN — ATORVASTATIN CALCIUM 40 MG: 40 TABLET, FILM COATED ORAL at 16:50

## 2022-01-01 RX ADMIN — LABETALOL HYDROCHLORIDE 10 MG: 5 INJECTION, SOLUTION INTRAVENOUS at 23:24

## 2022-01-01 RX ADMIN — IOHEXOL 100 ML: 350 INJECTION, SOLUTION INTRAVENOUS at 13:14

## 2022-01-01 RX ADMIN — CALCIUM CHLORIDE 1 G: 100 INJECTION, SOLUTION INTRAVENOUS at 14:42

## 2022-01-01 RX ADMIN — INSULIN LISPRO 2 UNITS: 100 INJECTION, SOLUTION INTRAVENOUS; SUBCUTANEOUS at 17:26

## 2022-01-01 RX ADMIN — HEPARIN SODIUM 5000 UNITS: 5000 INJECTION INTRAVENOUS; SUBCUTANEOUS at 15:31

## 2022-01-01 RX ADMIN — LORAZEPAM 1 MG: 2 INJECTION INTRAMUSCULAR; INTRAVENOUS at 15:05

## 2022-01-01 RX ADMIN — SODIUM CHLORIDE 7 UNITS/HR: 9 INJECTION, SOLUTION INTRAVENOUS at 11:13

## 2022-01-01 RX ADMIN — HALOPERIDOL LACTATE 0.5 MG: 5 INJECTION, SOLUTION INTRAMUSCULAR at 06:59

## 2022-01-01 RX ADMIN — SENNOSIDES, DOCUSATE SODIUM 2 TABLET: 8.6; 5 TABLET ORAL at 11:44

## 2022-01-01 RX ADMIN — NOREPINEPHRINE BITARTRATE 3 MCG/MIN: 1 INJECTION INTRAVENOUS at 10:44

## 2022-01-01 RX ADMIN — EPINEPHRINE: 1 INJECTION, SOLUTION, CONCENTRATE INTRAVENOUS at 12:00

## 2022-01-01 RX ADMIN — ACETAMINOPHEN 650 MG: 325 SUPPOSITORY RECTAL at 23:58

## 2022-01-01 RX ADMIN — LORAZEPAM 1 MG: 2 INJECTION INTRAMUSCULAR; INTRAVENOUS at 21:24

## 2022-01-01 RX ADMIN — INSULIN GLARGINE 20 UNITS: 100 INJECTION, SOLUTION SUBCUTANEOUS at 08:13

## 2022-01-01 RX ADMIN — HEPARIN SODIUM 5000 UNITS: 5000 INJECTION INTRAVENOUS; SUBCUTANEOUS at 05:50

## 2022-01-01 RX ADMIN — LORAZEPAM 1 MG: 2 INJECTION INTRAMUSCULAR; INTRAVENOUS at 20:43

## 2022-01-01 RX ADMIN — LABETALOL HYDROCHLORIDE 20 MG: 5 INJECTION, SOLUTION INTRAVENOUS at 22:58

## 2022-01-01 RX ADMIN — SODIUM CHLORIDE 3 UNITS/HR: 9 INJECTION, SOLUTION INTRAVENOUS at 20:30

## 2022-01-01 RX ADMIN — AMLODIPINE BESYLATE 10 MG: 10 TABLET ORAL at 08:00

## 2022-01-01 RX ADMIN — ATORVASTATIN CALCIUM 40 MG: 40 TABLET, FILM COATED ORAL at 17:34

## 2022-01-01 RX ADMIN — CARVEDILOL 12.5 MG: 12.5 TABLET, FILM COATED ORAL at 17:14

## 2022-01-01 RX ADMIN — ACETAMINOPHEN 975 MG: 650 SUSPENSION ORAL at 15:37

## 2022-01-01 RX ADMIN — HYDROMORPHONE HYDROCHLORIDE 0.5 MG: 1 INJECTION, SOLUTION INTRAMUSCULAR; INTRAVENOUS; SUBCUTANEOUS at 06:59

## 2022-01-01 RX ADMIN — INSULIN LISPRO 4 UNITS: 100 INJECTION, SOLUTION INTRAVENOUS; SUBCUTANEOUS at 11:49

## 2022-01-01 RX ADMIN — ACETAMINOPHEN 975 MG: 650 SUSPENSION ORAL at 10:21

## 2022-01-01 RX ADMIN — ATORVASTATIN CALCIUM 40 MG: 40 TABLET, FILM COATED ORAL at 18:14

## 2022-01-01 RX ADMIN — INSULIN GLARGINE 20 UNITS: 100 INJECTION, SOLUTION SUBCUTANEOUS at 11:29

## 2022-01-01 RX ADMIN — LABETALOL HYDROCHLORIDE 20 MG: 5 INJECTION, SOLUTION INTRAVENOUS at 15:15

## 2022-01-01 RX ADMIN — CEFEPIME 2000 MG: 2 INJECTION, POWDER, FOR SOLUTION INTRAVENOUS at 12:35

## 2022-01-01 RX ADMIN — SODIUM CHLORIDE, SODIUM LACTATE, POTASSIUM CHLORIDE, AND CALCIUM CHLORIDE 1000 ML: .6; .31; .03; .02 INJECTION, SOLUTION INTRAVENOUS at 18:26

## 2022-01-01 RX ADMIN — LABETALOL HYDROCHLORIDE 10 MG: 5 INJECTION, SOLUTION INTRAVENOUS at 05:46

## 2022-01-01 RX ADMIN — HEPARIN SODIUM 12 UNITS/KG/HR: 10000 INJECTION, SOLUTION INTRAVENOUS at 22:50

## 2022-01-01 RX ADMIN — HYDROMORPHONE HYDROCHLORIDE 0.5 MG: 1 INJECTION, SOLUTION INTRAMUSCULAR; INTRAVENOUS; SUBCUTANEOUS at 23:18

## 2022-01-01 RX ADMIN — INSULIN LISPRO 2 UNITS: 100 INJECTION, SOLUTION INTRAVENOUS; SUBCUTANEOUS at 22:05

## 2022-01-01 RX ADMIN — POTASSIUM PHOSPHATE, MONOBASIC AND POTASSIUM PHOSPHATE, DIBASIC 30 MMOL: 224; 236 INJECTION, SOLUTION, CONCENTRATE INTRAVENOUS at 08:43

## 2022-01-01 RX ADMIN — POTASSIUM CHLORIDE 40 MEQ: 1500 TABLET, EXTENDED RELEASE ORAL at 08:13

## 2022-01-01 RX ADMIN — HYDROMORPHONE HYDROCHLORIDE 0.3 MG: 1 INJECTION, SOLUTION INTRAMUSCULAR; INTRAVENOUS; SUBCUTANEOUS at 13:55

## 2022-01-01 RX ADMIN — INSULIN LISPRO 12 UNITS: 100 INJECTION, SOLUTION INTRAVENOUS; SUBCUTANEOUS at 05:57

## 2022-01-01 RX ADMIN — CHLORHEXIDINE GLUCONATE 15 ML: 1.2 SOLUTION ORAL at 21:20

## 2022-01-01 RX ADMIN — CHLORTHALIDONE 25 MG: 25 TABLET ORAL at 08:20

## 2022-01-01 RX ADMIN — CHLORHEXIDINE GLUCONATE 15 ML: 1.2 SOLUTION ORAL at 20:40

## 2022-01-01 RX ADMIN — HYDRALAZINE HYDROCHLORIDE 10 MG: 20 INJECTION, SOLUTION INTRAMUSCULAR; INTRAVENOUS at 08:34

## 2022-01-01 RX ADMIN — CALCIUM CHLORIDE 1 G: 100 INJECTION PARENTERAL at 10:41

## 2022-01-01 RX ADMIN — CARVEDILOL 3.12 MG: 3.12 TABLET, FILM COATED ORAL at 08:00

## 2022-01-01 RX ADMIN — LORAZEPAM 1 MG: 2 INJECTION INTRAMUSCULAR; INTRAVENOUS at 08:19

## 2022-01-01 RX ADMIN — INSULIN LISPRO 6 UNITS: 100 INJECTION, SOLUTION INTRAVENOUS; SUBCUTANEOUS at 17:21

## 2022-01-01 RX ADMIN — SENNOSIDES, DOCUSATE SODIUM 2 TABLET: 8.6; 5 TABLET ORAL at 17:21

## 2022-01-01 RX ADMIN — INSULIN LISPRO 6 UNITS: 100 INJECTION, SOLUTION INTRAVENOUS; SUBCUTANEOUS at 12:07

## 2022-01-01 RX ADMIN — INSULIN LISPRO 4 UNITS: 100 INJECTION, SOLUTION INTRAVENOUS; SUBCUTANEOUS at 11:29

## 2022-01-01 RX ADMIN — INSULIN LISPRO 2 UNITS: 100 INJECTION, SOLUTION INTRAVENOUS; SUBCUTANEOUS at 06:12

## 2022-01-01 RX ADMIN — HYDROMORPHONE HYDROCHLORIDE 0.5 MG: 1 INJECTION, SOLUTION INTRAMUSCULAR; INTRAVENOUS; SUBCUTANEOUS at 21:54

## 2022-01-01 RX ADMIN — POTASSIUM CHLORIDE 40 MEQ: 20 SOLUTION ORAL at 09:55

## 2022-01-01 RX ADMIN — HYDROMORPHONE HYDROCHLORIDE 0.5 MG: 1 INJECTION, SOLUTION INTRAMUSCULAR; INTRAVENOUS; SUBCUTANEOUS at 16:19

## 2022-01-01 RX ADMIN — HYDROMORPHONE HYDROCHLORIDE 0.5 MG: 1 INJECTION, SOLUTION INTRAMUSCULAR; INTRAVENOUS; SUBCUTANEOUS at 20:43

## 2022-01-01 RX ADMIN — HYDROMORPHONE HYDROCHLORIDE 0.5 MG: 1 INJECTION, SOLUTION INTRAMUSCULAR; INTRAVENOUS; SUBCUTANEOUS at 20:00

## 2022-01-01 RX ADMIN — SENNOSIDES, DOCUSATE SODIUM 2 TABLET: 8.6; 5 TABLET ORAL at 17:18

## 2022-01-01 RX ADMIN — LORAZEPAM 1 MG: 2 INJECTION INTRAMUSCULAR; INTRAVENOUS at 19:34

## 2022-01-01 RX ADMIN — HYDROMORPHONE HYDROCHLORIDE 0.5 MG: 1 INJECTION, SOLUTION INTRAMUSCULAR; INTRAVENOUS; SUBCUTANEOUS at 17:35

## 2022-01-01 RX ADMIN — ACETAMINOPHEN 650 MG: 650 SUSPENSION ORAL at 22:26

## 2022-01-01 RX ADMIN — LORAZEPAM 1 MG: 2 INJECTION INTRAMUSCULAR; INTRAVENOUS at 04:54

## 2022-01-01 RX ADMIN — HYDROMORPHONE HYDROCHLORIDE 0.5 MG: 1 INJECTION, SOLUTION INTRAMUSCULAR; INTRAVENOUS; SUBCUTANEOUS at 09:33

## 2022-01-01 RX ADMIN — VASOPRESSIN 0.04 UNITS/MIN: 20 INJECTION INTRAVENOUS at 12:39

## 2022-01-01 RX ADMIN — ACETAMINOPHEN 650 MG: 650 SUSPENSION ORAL at 16:11

## 2022-01-01 RX ADMIN — ATORVASTATIN CALCIUM 40 MG: 40 TABLET, FILM COATED ORAL at 18:08

## 2022-01-01 RX ADMIN — PANTOPRAZOLE SODIUM 40 MG: 40 INJECTION, POWDER, FOR SOLUTION INTRAVENOUS at 08:30

## 2022-01-01 RX ADMIN — NOREPINEPHRINE BITARTRATE 5 MCG/MIN: 1 INJECTION INTRAVENOUS at 11:09

## 2022-01-01 RX ADMIN — LORAZEPAM 0.5 MG: 2 INJECTION INTRAMUSCULAR; INTRAVENOUS at 14:57

## 2022-01-01 RX ADMIN — OXYCODONE HYDROCHLORIDE 5 MG: 5 TABLET ORAL at 01:22

## 2022-01-01 RX ADMIN — INSULIN LISPRO 4 UNITS: 100 INJECTION, SOLUTION INTRAVENOUS; SUBCUTANEOUS at 00:15

## 2022-01-01 RX ADMIN — CHLORHEXIDINE GLUCONATE 15 ML: 1.2 SOLUTION ORAL at 20:48

## 2022-01-01 RX ADMIN — INSULIN LISPRO 6 UNITS: 100 INJECTION, SOLUTION INTRAVENOUS; SUBCUTANEOUS at 21:00

## 2022-01-01 RX ADMIN — LISINOPRIL 20 MG: 20 TABLET ORAL at 08:00

## 2022-01-01 RX ADMIN — AMLODIPINE BESYLATE 10 MG: 10 TABLET ORAL at 10:31

## 2022-01-01 RX ADMIN — INSULIN LISPRO 12 UNITS: 100 INJECTION, SOLUTION INTRAVENOUS; SUBCUTANEOUS at 18:08

## 2022-01-01 RX ADMIN — DOXAZOSIN 2 MG: 2 TABLET ORAL at 22:00

## 2022-01-01 RX ADMIN — SODIUM BICARBONATE 50 MEQ: 84 INJECTION, SOLUTION INTRAVENOUS at 10:40

## 2022-01-01 RX ADMIN — HYDROMORPHONE HYDROCHLORIDE 0.5 MG: 1 INJECTION, SOLUTION INTRAMUSCULAR; INTRAVENOUS; SUBCUTANEOUS at 17:57

## 2022-01-01 RX ADMIN — HYDRALAZINE HYDROCHLORIDE 10 MG: 20 INJECTION, SOLUTION INTRAMUSCULAR; INTRAVENOUS at 10:58

## 2022-01-01 RX ADMIN — ATORVASTATIN CALCIUM 40 MG: 40 TABLET, FILM COATED ORAL at 17:21

## 2022-01-01 RX ADMIN — SENNOSIDES, DOCUSATE SODIUM 2 TABLET: 8.6; 5 TABLET ORAL at 18:05

## 2022-01-01 RX ADMIN — POLYETHYLENE GLYCOL 3350 17 G: 17 POWDER, FOR SOLUTION ORAL at 08:01

## 2022-01-01 RX ADMIN — AMLODIPINE BESYLATE 10 MG: 10 TABLET ORAL at 08:23

## 2022-01-01 RX ADMIN — HYDROMORPHONE HYDROCHLORIDE 0.5 MG: 1 INJECTION, SOLUTION INTRAMUSCULAR; INTRAVENOUS; SUBCUTANEOUS at 00:01

## 2022-01-01 RX ADMIN — GLYCOPYRROLATE 0.1 MG: 0.2 INJECTION INTRAMUSCULAR; INTRAVENOUS at 04:02

## 2022-01-01 RX ADMIN — SODIUM CHLORIDE, SODIUM GLUCONATE, SODIUM ACETATE, POTASSIUM CHLORIDE, MAGNESIUM CHLORIDE, SODIUM PHOSPHATE, DIBASIC, AND POTASSIUM PHOSPHATE 1000 ML: .53; .5; .37; .037; .03; .012; .00082 INJECTION, SOLUTION INTRAVENOUS at 03:30

## 2022-01-01 RX ADMIN — HEPARIN SODIUM 10 UNITS/KG/HR: 10000 INJECTION, SOLUTION INTRAVENOUS at 03:30

## 2022-01-01 RX ADMIN — INSULIN LISPRO 5 UNITS: 100 INJECTION, SOLUTION INTRAVENOUS; SUBCUTANEOUS at 05:23

## 2022-01-01 RX ADMIN — SODIUM CHLORIDE, SODIUM GLUCONATE, SODIUM ACETATE, POTASSIUM CHLORIDE, MAGNESIUM CHLORIDE, SODIUM PHOSPHATE, DIBASIC, AND POTASSIUM PHOSPHATE 500 ML: .53; .5; .37; .037; .03; .012; .00082 INJECTION, SOLUTION INTRAVENOUS at 15:33

## 2022-01-01 RX ADMIN — CHLORHEXIDINE GLUCONATE 15 ML: 1.2 SOLUTION ORAL at 08:10

## 2022-01-01 RX ADMIN — HYDROMORPHONE HYDROCHLORIDE 0.5 MG: 1 INJECTION, SOLUTION INTRAMUSCULAR; INTRAVENOUS; SUBCUTANEOUS at 22:10

## 2022-01-01 RX ADMIN — LORAZEPAM 1 MG: 2 INJECTION INTRAMUSCULAR; INTRAVENOUS at 00:25

## 2022-01-01 RX ADMIN — EPINEPHRINE 1 MG: 0.1 INJECTION INTRACARDIAC; INTRAVENOUS at 10:42

## 2022-01-01 RX ADMIN — HYDROMORPHONE HYDROCHLORIDE 0.5 MG: 1 INJECTION, SOLUTION INTRAMUSCULAR; INTRAVENOUS; SUBCUTANEOUS at 00:29

## 2022-01-01 RX ADMIN — HYDROMORPHONE HYDROCHLORIDE 0.5 MG: 1 INJECTION, SOLUTION INTRAMUSCULAR; INTRAVENOUS; SUBCUTANEOUS at 20:32

## 2022-01-01 RX ADMIN — LABETALOL HYDROCHLORIDE 20 MG: 5 INJECTION, SOLUTION INTRAVENOUS at 00:12

## 2022-01-01 RX ADMIN — HYDRALAZINE HYDROCHLORIDE 10 MG: 20 INJECTION, SOLUTION INTRAMUSCULAR; INTRAVENOUS at 05:44

## 2022-01-01 RX ADMIN — ATORVASTATIN CALCIUM 40 MG: 40 TABLET, FILM COATED ORAL at 18:25

## 2022-01-01 RX ADMIN — SODIUM CHLORIDE 7 UNITS/HR: 9 INJECTION, SOLUTION INTRAVENOUS at 20:13

## 2022-01-01 RX ADMIN — INSULIN GLARGINE 20 UNITS: 100 INJECTION, SOLUTION SUBCUTANEOUS at 11:26

## 2022-01-01 RX ADMIN — NICARDIPINE HYDROCHLORIDE 2.5 MG/HR: 2.5 INJECTION, SOLUTION INTRAVENOUS at 18:53

## 2022-01-01 RX ADMIN — HYDROMORPHONE HYDROCHLORIDE 0.5 MG: 1 INJECTION, SOLUTION INTRAMUSCULAR; INTRAVENOUS; SUBCUTANEOUS at 15:00

## 2022-01-01 RX ADMIN — HYDROMORPHONE HYDROCHLORIDE 0.5 MG: 1 INJECTION, SOLUTION INTRAMUSCULAR; INTRAVENOUS; SUBCUTANEOUS at 15:41

## 2022-01-01 RX ADMIN — CARVEDILOL 12.5 MG: 12.5 TABLET, FILM COATED ORAL at 07:07

## 2022-01-01 RX ADMIN — LISINOPRIL 10 MG: 10 TABLET ORAL at 09:23

## 2022-01-01 RX ADMIN — NOREPINEPHRINE BITARTRATE 30 MCG/MIN: 1 INJECTION INTRAVENOUS at 12:39

## 2022-01-01 RX ADMIN — SENNOSIDES, DOCUSATE SODIUM 2 TABLET: 8.6; 5 TABLET ORAL at 17:34

## 2022-01-01 RX ADMIN — SODIUM CHLORIDE 4 MG/HR: 9 INJECTION INTRAMUSCULAR; INTRAVENOUS; SUBCUTANEOUS at 07:36

## 2022-01-01 RX ADMIN — EPINEPHRINE 1 MG: 0.1 INJECTION INTRACARDIAC; INTRAVENOUS at 11:25

## 2022-01-01 RX ADMIN — HYDROMORPHONE HYDROCHLORIDE 0.5 MG: 1 INJECTION, SOLUTION INTRAMUSCULAR; INTRAVENOUS; SUBCUTANEOUS at 19:56

## 2022-01-01 RX ADMIN — LISINOPRIL 10 MG: 10 TABLET ORAL at 14:17

## 2022-01-01 RX ADMIN — AMLODIPINE BESYLATE 5 MG: 5 TABLET ORAL at 08:10

## 2022-01-01 RX ADMIN — CEFTRIAXONE 2000 MG: 2 INJECTION, POWDER, FOR SOLUTION INTRAMUSCULAR; INTRAVENOUS at 14:03

## 2022-01-01 RX ADMIN — CEFEPIME 2000 MG: 2 INJECTION, POWDER, FOR SOLUTION INTRAVENOUS at 23:59

## 2022-01-01 RX ADMIN — INSULIN LISPRO 12 UNITS: 100 INJECTION, SOLUTION INTRAVENOUS; SUBCUTANEOUS at 00:14

## 2022-01-01 RX ADMIN — OXYCODONE HYDROCHLORIDE 5 MG: 5 TABLET ORAL at 17:09

## 2022-01-01 RX ADMIN — MODAFINIL 100 MG: 100 TABLET ORAL at 08:10

## 2022-01-01 RX ADMIN — SODIUM CHLORIDE 5 MG/HR: 0.9 INJECTION, SOLUTION INTRAVENOUS at 04:02

## 2022-01-01 RX ADMIN — DOXAZOSIN 2 MG: 2 TABLET ORAL at 18:49

## 2022-01-01 RX ADMIN — CHLORHEXIDINE GLUCONATE 15 ML: 1.2 SOLUTION ORAL at 10:01

## 2022-01-01 RX ADMIN — CARVEDILOL 12.5 MG: 12.5 TABLET, FILM COATED ORAL at 08:03

## 2022-01-01 RX ADMIN — LORAZEPAM 1 MG: 2 INJECTION INTRAMUSCULAR; INTRAVENOUS at 11:32

## 2022-01-01 RX ADMIN — CHLORHEXIDINE GLUCONATE 15 ML: 1.2 SOLUTION ORAL at 22:24

## 2022-01-01 RX ADMIN — ACETAMINOPHEN 650 MG: 650 SUSPENSION ORAL at 11:29

## 2022-01-01 RX ADMIN — CARVEDILOL 12.5 MG: 12.5 TABLET, FILM COATED ORAL at 17:34

## 2022-01-01 RX ADMIN — CITALOPRAM HYDROBROMIDE 10 MG: 10 TABLET ORAL at 08:14

## 2022-01-01 RX ADMIN — SODIUM CHLORIDE, SODIUM GLUCONATE, SODIUM ACETATE, POTASSIUM CHLORIDE, MAGNESIUM CHLORIDE, SODIUM PHOSPHATE, DIBASIC, AND POTASSIUM PHOSPHATE 500 ML: .53; .5; .37; .037; .03; .012; .00082 INJECTION, SOLUTION INTRAVENOUS at 22:19

## 2022-01-01 RX ADMIN — CEFTRIAXONE 2000 MG: 2 INJECTION, POWDER, FOR SOLUTION INTRAMUSCULAR; INTRAVENOUS at 14:07

## 2022-01-01 RX ADMIN — CHLORHEXIDINE GLUCONATE 15 ML: 1.2 SOLUTION ORAL at 13:50

## 2022-01-01 RX ADMIN — HYDROMORPHONE HYDROCHLORIDE 0.5 MG: 1 INJECTION, SOLUTION INTRAMUSCULAR; INTRAVENOUS; SUBCUTANEOUS at 09:03

## 2022-01-01 RX ADMIN — CARVEDILOL 12.5 MG: 12.5 TABLET, FILM COATED ORAL at 08:35

## 2022-01-01 RX ADMIN — AMLODIPINE BESYLATE 10 MG: 10 TABLET ORAL at 09:23

## 2022-01-01 RX ADMIN — LABETALOL HYDROCHLORIDE 20 MG: 5 INJECTION, SOLUTION INTRAVENOUS at 00:37

## 2022-01-01 RX ADMIN — ACETAMINOPHEN 650 MG: 325 TABLET ORAL at 09:28

## 2022-01-01 RX ADMIN — HYDROMORPHONE HYDROCHLORIDE 0.5 MG: 1 INJECTION, SOLUTION INTRAMUSCULAR; INTRAVENOUS; SUBCUTANEOUS at 01:23

## 2022-01-01 RX ADMIN — CHLORHEXIDINE GLUCONATE 15 ML: 1.2 SOLUTION ORAL at 20:09

## 2022-01-01 RX ADMIN — CHLORHEXIDINE GLUCONATE 15 ML: 1.2 SOLUTION ORAL at 09:49

## 2022-01-01 RX ADMIN — CEFTRIAXONE SODIUM 1000 MG: 10 INJECTION, POWDER, FOR SOLUTION INTRAVENOUS at 22:31

## 2022-01-01 RX ADMIN — ACETAMINOPHEN 975 MG: 650 SUSPENSION ORAL at 20:43

## 2022-01-01 RX ADMIN — CARVEDILOL 12.5 MG: 12.5 TABLET, FILM COATED ORAL at 17:05

## 2022-01-01 RX ADMIN — CITALOPRAM HYDROBROMIDE 10 MG: 10 TABLET ORAL at 08:12

## 2022-01-01 RX ADMIN — GLYCERIN 1 DROP: .002; .002; .01 SOLUTION/ DROPS OPHTHALMIC at 17:28

## 2022-01-01 RX ADMIN — SODIUM CHLORIDE 0.5 MG/HR: 9 INJECTION INTRAMUSCULAR; INTRAVENOUS; SUBCUTANEOUS at 13:49

## 2022-01-01 RX ADMIN — INSULIN LISPRO 2 UNITS: 100 INJECTION, SOLUTION INTRAVENOUS; SUBCUTANEOUS at 17:28

## 2022-01-01 RX ADMIN — HEPARIN SODIUM 5000 UNITS: 5000 INJECTION INTRAVENOUS; SUBCUTANEOUS at 05:58

## 2022-01-01 RX ADMIN — CARVEDILOL 3.12 MG: 3.12 TABLET, FILM COATED ORAL at 16:11

## 2022-01-01 RX ADMIN — SODIUM CHLORIDE 4 MG/HR: 9 INJECTION INTRAMUSCULAR; INTRAVENOUS; SUBCUTANEOUS at 20:42

## 2022-01-01 RX ADMIN — SENNOSIDES, DOCUSATE SODIUM 2 TABLET: 8.6; 5 TABLET ORAL at 18:31

## 2022-01-01 RX ADMIN — SENNOSIDES, DOCUSATE SODIUM 2 TABLET: 8.6; 5 TABLET ORAL at 18:49

## 2022-01-01 RX ADMIN — INSULIN LISPRO 12 UNITS: 100 INJECTION, SOLUTION INTRAVENOUS; SUBCUTANEOUS at 06:04

## 2022-01-01 RX ADMIN — LABETALOL HYDROCHLORIDE 10 MG: 5 INJECTION, SOLUTION INTRAVENOUS at 10:45

## 2022-01-01 RX ADMIN — POTASSIUM CHLORIDE 20 MEQ: 20 SOLUTION ORAL at 08:08

## 2022-01-01 RX ADMIN — INSULIN LISPRO 2 UNITS: 100 INJECTION, SOLUTION INTRAVENOUS; SUBCUTANEOUS at 21:53

## 2022-01-01 RX ADMIN — HEPARIN SODIUM 5000 UNITS: 5000 INJECTION INTRAVENOUS; SUBCUTANEOUS at 08:49

## 2022-01-01 RX ADMIN — CHLORTHALIDONE 25 MG: 25 TABLET ORAL at 11:30

## 2022-01-01 RX ADMIN — HYDROMORPHONE HYDROCHLORIDE 0.5 MG: 1 INJECTION, SOLUTION INTRAMUSCULAR; INTRAVENOUS; SUBCUTANEOUS at 17:47

## 2022-01-01 RX ADMIN — NICARDIPINE HYDROCHLORIDE 2.5 MG/HR: 2.5 INJECTION, SOLUTION INTRAVENOUS at 08:23

## 2022-01-01 RX ADMIN — CITALOPRAM HYDROBROMIDE 10 MG: 10 TABLET ORAL at 11:27

## 2022-01-01 RX ADMIN — CALCIUM GLUCONATE 2 G: 20 INJECTION, SOLUTION INTRAVENOUS at 11:22

## 2022-01-01 RX ADMIN — SODIUM CHLORIDE 100 ML/HR: 0.9 INJECTION, SOLUTION INTRAVENOUS at 08:45

## 2022-01-01 RX ADMIN — AMLODIPINE BESYLATE 10 MG: 10 TABLET ORAL at 08:35

## 2022-01-01 RX ADMIN — LABETALOL HYDROCHLORIDE 20 MG: 5 INJECTION, SOLUTION INTRAVENOUS at 17:04

## 2022-01-01 RX ADMIN — LISINOPRIL 10 MG: 10 TABLET ORAL at 13:29

## 2022-01-01 RX ADMIN — INSULIN LISPRO 2 UNITS: 100 INJECTION, SOLUTION INTRAVENOUS; SUBCUTANEOUS at 23:59

## 2022-01-01 RX ADMIN — HYDRALAZINE HYDROCHLORIDE 10 MG: 20 INJECTION, SOLUTION INTRAMUSCULAR; INTRAVENOUS at 01:08

## 2022-01-01 RX ADMIN — FENTANYL CITRATE 25 MCG: 50 INJECTION INTRAMUSCULAR; INTRAVENOUS at 13:51

## 2022-01-01 RX ADMIN — POLYETHYLENE GLYCOL 3350 17 G: 17 POWDER, FOR SOLUTION ORAL at 08:49

## 2022-01-01 RX ADMIN — POTASSIUM PHOSPHATE, MONOBASIC AND POTASSIUM PHOSPHATE, DIBASIC 12 MMOL: 224; 236 INJECTION, SOLUTION, CONCENTRATE INTRAVENOUS at 11:13

## 2022-01-01 RX ADMIN — INSULIN LISPRO 6 UNITS: 100 INJECTION, SOLUTION INTRAVENOUS; SUBCUTANEOUS at 15:12

## 2022-01-01 RX ADMIN — PANTOPRAZOLE SODIUM 40 MG: 40 INJECTION, POWDER, FOR SOLUTION INTRAVENOUS at 08:13

## 2022-01-01 RX ADMIN — CITALOPRAM HYDROBROMIDE 10 MG: 10 TABLET ORAL at 08:50

## 2022-01-01 RX ADMIN — INSULIN LISPRO 12 UNITS: 100 INJECTION, SOLUTION INTRAVENOUS; SUBCUTANEOUS at 11:10

## 2022-01-01 RX ADMIN — SODIUM CHLORIDE 4 MG/HR: 9 INJECTION INTRAMUSCULAR; INTRAVENOUS; SUBCUTANEOUS at 09:39

## 2022-01-01 RX ADMIN — ATORVASTATIN CALCIUM 40 MG: 40 TABLET, FILM COATED ORAL at 18:31

## 2022-01-01 RX ADMIN — HYDROMORPHONE HYDROCHLORIDE 0.5 MG: 1 INJECTION, SOLUTION INTRAMUSCULAR; INTRAVENOUS; SUBCUTANEOUS at 14:32

## 2022-01-01 RX ADMIN — LABETALOL HYDROCHLORIDE 20 MG: 5 INJECTION, SOLUTION INTRAVENOUS at 23:54

## 2022-01-01 RX ADMIN — SODIUM CHLORIDE 2.5 MG/HR: 0.9 INJECTION, SOLUTION INTRAVENOUS at 02:16

## 2022-01-01 RX ADMIN — INSULIN LISPRO 2 UNITS: 100 INJECTION, SOLUTION INTRAVENOUS; SUBCUTANEOUS at 12:08

## 2022-01-01 RX ADMIN — IODIXANOL 40 ML: 320 INJECTION, SOLUTION INTRAVASCULAR at 20:27

## 2022-01-01 RX ADMIN — ATORVASTATIN CALCIUM 40 MG: 40 TABLET, FILM COATED ORAL at 17:14

## 2022-01-01 RX ADMIN — ACETAMINOPHEN 975 MG: 650 SUSPENSION ORAL at 08:34

## 2022-01-01 RX ADMIN — LABETALOL HYDROCHLORIDE 20 MG: 5 INJECTION, SOLUTION INTRAVENOUS at 23:18

## 2022-01-01 RX ADMIN — ATORVASTATIN CALCIUM 40 MG: 40 TABLET, FILM COATED ORAL at 18:49

## 2022-01-01 RX ADMIN — Medication 20 MG: at 08:10

## 2022-01-01 RX ADMIN — CHLORHEXIDINE GLUCONATE 15 ML: 1.2 SOLUTION ORAL at 20:19

## 2022-01-01 RX ADMIN — ACETAMINOPHEN 650 MG: 650 SUSPENSION ORAL at 21:55

## 2022-01-01 RX ADMIN — CHLORHEXIDINE GLUCONATE 15 ML: 1.2 SOLUTION ORAL at 21:00

## 2022-01-01 RX ADMIN — CHLORTHALIDONE 25 MG: 25 TABLET ORAL at 08:12

## 2022-01-01 RX ADMIN — BUTALBITAL, ACETAMINOPHEN, AND CAFFEINE 1 TABLET: 50; 325; 40 TABLET ORAL at 17:45

## 2022-01-01 RX ADMIN — SODIUM CHLORIDE 75 ML/HR: 0.9 INJECTION, SOLUTION INTRAVENOUS at 13:39

## 2022-01-01 RX ADMIN — LORAZEPAM 1 MG: 2 INJECTION INTRAMUSCULAR; INTRAVENOUS at 21:54

## 2022-01-01 RX ADMIN — INSULIN LISPRO 2 UNITS: 100 INJECTION, SOLUTION INTRAVENOUS; SUBCUTANEOUS at 17:21

## 2022-01-01 RX ADMIN — LABETALOL HYDROCHLORIDE 10 MG: 5 INJECTION, SOLUTION INTRAVENOUS at 00:51

## 2022-01-01 RX ADMIN — CHLORHEXIDINE GLUCONATE 15 ML: 1.2 SOLUTION ORAL at 20:31

## 2022-01-01 RX ADMIN — CHLORHEXIDINE GLUCONATE 15 ML: 1.2 SOLUTION ORAL at 01:16

## 2022-01-01 RX ADMIN — CEFTRIAXONE SODIUM 1000 MG: 10 INJECTION, POWDER, FOR SOLUTION INTRAVENOUS at 22:58

## 2022-01-01 RX ADMIN — CARVEDILOL 12.5 MG: 12.5 TABLET, FILM COATED ORAL at 16:50

## 2022-01-01 RX ADMIN — ACETAMINOPHEN 975 MG: 650 SUSPENSION ORAL at 01:28

## 2022-01-01 RX ADMIN — SENNOSIDES, DOCUSATE SODIUM 2 TABLET: 8.6; 5 TABLET ORAL at 08:01

## 2022-01-01 RX ADMIN — SODIUM CHLORIDE 100 ML/HR: 0.9 INJECTION, SOLUTION INTRAVENOUS at 10:23

## 2022-01-01 RX ADMIN — ACETAMINOPHEN 650 MG: 650 SUSPENSION ORAL at 16:25

## 2022-01-01 RX ADMIN — INSULIN LISPRO 4 UNITS: 100 INJECTION, SOLUTION INTRAVENOUS; SUBCUTANEOUS at 17:21

## 2022-01-01 RX ADMIN — GLYCOPYRROLATE 0.1 MG: 0.2 INJECTION INTRAMUSCULAR; INTRAVENOUS at 20:14

## 2022-01-01 RX ADMIN — EPINEPHRINE 1 MCG/MIN: 1 INJECTION, SOLUTION, CONCENTRATE INTRAVENOUS at 12:00

## 2022-01-01 RX ADMIN — GLYCOPYRROLATE 0.1 MG: 0.2 INJECTION INTRAMUSCULAR; INTRAVENOUS at 20:08

## 2022-01-01 RX ADMIN — CHLORHEXIDINE GLUCONATE 15 ML: 1.2 SOLUTION ORAL at 08:14

## 2022-01-01 RX ADMIN — SODIUM CHLORIDE, SODIUM GLUCONATE, SODIUM ACETATE, POTASSIUM CHLORIDE, MAGNESIUM CHLORIDE, SODIUM PHOSPHATE, DIBASIC, AND POTASSIUM PHOSPHATE 500 ML: .53; .5; .37; .037; .03; .012; .00082 INJECTION, SOLUTION INTRAVENOUS at 04:11

## 2022-01-01 RX ADMIN — HYDROMORPHONE HYDROCHLORIDE 0.5 MG: 1 INJECTION, SOLUTION INTRAMUSCULAR; INTRAVENOUS; SUBCUTANEOUS at 22:32

## 2022-01-01 RX ADMIN — HYDROMORPHONE HYDROCHLORIDE 0.5 MG: 1 INJECTION, SOLUTION INTRAMUSCULAR; INTRAVENOUS; SUBCUTANEOUS at 11:46

## 2022-01-01 RX ADMIN — INSULIN LISPRO 6 UNITS: 100 INJECTION, SOLUTION INTRAVENOUS; SUBCUTANEOUS at 03:33

## 2022-01-01 RX ADMIN — CEFTRIAXONE SODIUM 1000 MG: 10 INJECTION, POWDER, FOR SOLUTION INTRAVENOUS at 02:33

## 2022-01-01 RX ADMIN — SENNOSIDES, DOCUSATE SODIUM 2 TABLET: 8.6; 5 TABLET ORAL at 08:05

## 2022-01-01 RX ADMIN — INSULIN LISPRO 2 UNITS: 100 INJECTION, SOLUTION INTRAVENOUS; SUBCUTANEOUS at 11:23

## 2022-01-01 RX ADMIN — INSULIN LISPRO 2 UNITS: 100 INJECTION, SOLUTION INTRAVENOUS; SUBCUTANEOUS at 00:05

## 2022-01-01 RX ADMIN — ALBUMIN (HUMAN) 25 G: 12.5 INJECTION, SOLUTION INTRAVENOUS at 21:01

## 2022-01-01 RX ADMIN — MODAFINIL 100 MG: 100 TABLET ORAL at 08:49

## 2022-01-01 RX ADMIN — ACETAMINOPHEN 325 MG: 650 SUPPOSITORY RECTAL at 03:39

## 2022-01-01 RX ADMIN — HYDRALAZINE HYDROCHLORIDE 10 MG: 20 INJECTION, SOLUTION INTRAMUSCULAR; INTRAVENOUS at 12:07

## 2022-01-01 RX ADMIN — SODIUM CHLORIDE, SODIUM GLUCONATE, SODIUM ACETATE, POTASSIUM CHLORIDE, MAGNESIUM CHLORIDE, SODIUM PHOSPHATE, DIBASIC, AND POTASSIUM PHOSPHATE 1000 ML: .53; .5; .37; .037; .03; .012; .00082 INJECTION, SOLUTION INTRAVENOUS at 20:56

## 2022-01-01 RX ADMIN — SODIUM CHLORIDE 2.5 MG/HR: 0.9 INJECTION, SOLUTION INTRAVENOUS at 23:52

## 2022-01-01 RX ADMIN — HYDROMORPHONE HYDROCHLORIDE 0.5 MG: 1 INJECTION, SOLUTION INTRAMUSCULAR; INTRAVENOUS; SUBCUTANEOUS at 05:21

## 2022-01-01 RX ADMIN — LISINOPRIL 20 MG: 20 TABLET ORAL at 17:21

## 2022-01-01 RX ADMIN — POLYETHYLENE GLYCOL 3350 17 G: 17 POWDER, FOR SOLUTION ORAL at 08:35

## 2022-01-01 RX ADMIN — CHLORHEXIDINE GLUCONATE 15 ML: 1.2 SOLUTION ORAL at 21:23

## 2022-01-01 RX ADMIN — BUTALBITAL, ACETAMINOPHEN, AND CAFFEINE 1 TABLET: 50; 325; 40 TABLET ORAL at 12:19

## 2022-01-01 RX ADMIN — SENNOSIDES, DOCUSATE SODIUM 2 TABLET: 8.6; 5 TABLET ORAL at 08:13

## 2022-01-01 RX ADMIN — POTASSIUM CHLORIDE 40 MEQ: 29.8 INJECTION, SOLUTION INTRAVENOUS at 08:14

## 2022-01-01 RX ADMIN — CHLORHEXIDINE GLUCONATE 15 ML: 1.2 SOLUTION ORAL at 08:24

## 2022-01-01 RX ADMIN — SENNOSIDES, DOCUSATE SODIUM 2 TABLET: 8.6; 5 TABLET ORAL at 18:25

## 2022-01-01 RX ADMIN — INSULIN LISPRO 6 UNITS: 100 INJECTION, SOLUTION INTRAVENOUS; SUBCUTANEOUS at 06:12

## 2022-01-01 RX ADMIN — LABETALOL HYDROCHLORIDE 10 MG: 5 INJECTION, SOLUTION INTRAVENOUS at 09:02

## 2022-01-01 RX ADMIN — CARVEDILOL 12.5 MG: 12.5 TABLET, FILM COATED ORAL at 08:01

## 2022-01-01 RX ADMIN — SODIUM CHLORIDE 4 MG/HR: 9 INJECTION INTRAMUSCULAR; INTRAVENOUS; SUBCUTANEOUS at 13:20

## 2022-01-01 RX ADMIN — INSULIN LISPRO 3 UNITS: 100 INJECTION, SOLUTION INTRAVENOUS; SUBCUTANEOUS at 15:13

## 2022-01-01 RX ADMIN — ASPIRIN 81 MG: 81 TABLET, COATED ORAL at 08:12

## 2022-01-01 RX ADMIN — ACETAMINOPHEN 650 MG: 325 SUPPOSITORY RECTAL at 19:51

## 2022-01-01 RX ADMIN — LABETALOL HYDROCHLORIDE 20 MG: 5 INJECTION, SOLUTION INTRAVENOUS at 18:29

## 2022-01-01 RX ADMIN — LORAZEPAM 1 MG: 2 INJECTION INTRAMUSCULAR; INTRAVENOUS at 08:54

## 2022-01-01 RX ADMIN — HYDROMORPHONE HYDROCHLORIDE 0.5 MG: 1 INJECTION, SOLUTION INTRAMUSCULAR; INTRAVENOUS; SUBCUTANEOUS at 16:04

## 2022-01-01 RX ADMIN — FENTANYL CITRATE 25 MCG: 50 INJECTION INTRAMUSCULAR; INTRAVENOUS at 20:43

## 2022-01-01 RX ADMIN — HALOPERIDOL LACTATE 0.5 MG: 5 INJECTION, SOLUTION INTRAMUSCULAR at 22:54

## 2022-01-01 RX ADMIN — INSULIN LISPRO 2 UNITS: 100 INJECTION, SOLUTION INTRAVENOUS; SUBCUTANEOUS at 06:09

## 2022-01-01 RX ADMIN — LORAZEPAM 1 MG: 2 INJECTION INTRAMUSCULAR; INTRAVENOUS at 10:19

## 2022-01-01 RX ADMIN — CEFTRIAXONE SODIUM 1000 MG: 10 INJECTION, POWDER, FOR SOLUTION INTRAVENOUS at 23:00

## 2022-01-01 RX ADMIN — HYDROMORPHONE HYDROCHLORIDE 0.5 MG: 1 INJECTION, SOLUTION INTRAMUSCULAR; INTRAVENOUS; SUBCUTANEOUS at 14:00

## 2022-01-01 RX ADMIN — HEPARIN SODIUM 5000 UNITS: 5000 INJECTION INTRAVENOUS; SUBCUTANEOUS at 13:44

## 2022-01-01 RX ADMIN — BUTALBITAL, ACETAMINOPHEN, AND CAFFEINE 1 TABLET: 50; 325; 40 TABLET ORAL at 11:36

## 2022-01-01 RX ADMIN — LABETALOL HYDROCHLORIDE 20 MG: 5 INJECTION, SOLUTION INTRAVENOUS at 05:23

## 2022-01-01 RX ADMIN — BUTALBITAL, ACETAMINOPHEN, AND CAFFEINE 1 TABLET: 50; 325; 40 TABLET ORAL at 08:23

## 2022-01-01 RX ADMIN — INSULIN LISPRO 2 UNITS: 100 INJECTION, SOLUTION INTRAVENOUS; SUBCUTANEOUS at 22:51

## 2022-01-01 RX ADMIN — HALOPERIDOL LACTATE 0.5 MG: 5 INJECTION, SOLUTION INTRAMUSCULAR at 20:43

## 2022-01-01 RX ADMIN — NICARDIPINE HYDROCHLORIDE 2.5 MG/HR: 2.5 INJECTION, SOLUTION INTRAVENOUS at 05:51

## 2022-01-01 RX ADMIN — NOREPINEPHRINE BITARTRATE 10 MCG/MIN: 1 INJECTION INTRAVENOUS at 21:00

## 2022-01-01 RX ADMIN — CHLORTHALIDONE 25 MG: 25 TABLET ORAL at 10:02

## 2022-01-01 RX ADMIN — LABETALOL HYDROCHLORIDE 10 MG: 5 INJECTION, SOLUTION INTRAVENOUS at 08:21

## 2022-01-01 RX ADMIN — PANTOPRAZOLE SODIUM 40 MG: 40 INJECTION, POWDER, FOR SOLUTION INTRAVENOUS at 08:09

## 2022-01-01 RX ADMIN — HEPARIN SODIUM 5000 UNITS: 5000 INJECTION INTRAVENOUS; SUBCUTANEOUS at 21:03

## 2022-01-01 RX ADMIN — POTASSIUM CHLORIDE 40 MEQ: 29.8 INJECTION, SOLUTION INTRAVENOUS at 23:08

## 2022-01-01 RX ADMIN — HYDROMORPHONE HYDROCHLORIDE 0.5 MG: 1 INJECTION, SOLUTION INTRAMUSCULAR; INTRAVENOUS; SUBCUTANEOUS at 16:56

## 2022-01-01 RX ADMIN — LISINOPRIL 40 MG: 20 TABLET ORAL at 17:34

## 2022-01-01 RX ADMIN — HYDROMORPHONE HYDROCHLORIDE 0.5 MG: 1 INJECTION, SOLUTION INTRAMUSCULAR; INTRAVENOUS; SUBCUTANEOUS at 03:12

## 2022-01-01 RX ADMIN — HYDROMORPHONE HYDROCHLORIDE 0.5 MG: 1 INJECTION, SOLUTION INTRAMUSCULAR; INTRAVENOUS; SUBCUTANEOUS at 18:41

## 2022-01-01 RX ADMIN — HYDRALAZINE HYDROCHLORIDE 10 MG: 20 INJECTION, SOLUTION INTRAMUSCULAR; INTRAVENOUS at 12:19

## 2022-01-01 RX ADMIN — CITALOPRAM HYDROBROMIDE 10 MG: 10 TABLET ORAL at 10:01

## 2022-01-01 RX ADMIN — HYDROMORPHONE HYDROCHLORIDE 0.5 MG: 1 INJECTION, SOLUTION INTRAMUSCULAR; INTRAVENOUS; SUBCUTANEOUS at 11:56

## 2022-01-01 RX ADMIN — CHLORTHALIDONE 25 MG: 25 TABLET ORAL at 08:08

## 2022-01-01 RX ADMIN — LISINOPRIL 40 MG: 20 TABLET ORAL at 17:09

## 2022-01-01 RX ADMIN — LORAZEPAM 1 MG: 2 INJECTION INTRAMUSCULAR; INTRAVENOUS at 19:56

## 2022-01-01 RX ADMIN — INSULIN LISPRO 4 UNITS: 100 INJECTION, SOLUTION INTRAVENOUS; SUBCUTANEOUS at 08:13

## 2022-01-01 RX ADMIN — ACETAMINOPHEN 650 MG: 650 SUSPENSION ORAL at 15:58

## 2022-01-01 RX ADMIN — CHLORHEXIDINE GLUCONATE 15 ML: 1.2 SOLUTION ORAL at 08:13

## 2022-01-01 RX ADMIN — SENNOSIDES, DOCUSATE SODIUM 2 TABLET: 8.6; 5 TABLET ORAL at 17:03

## 2022-01-01 RX ADMIN — INSULIN GLARGINE 20 UNITS: 100 INJECTION, SOLUTION SUBCUTANEOUS at 09:24

## 2022-01-01 RX ADMIN — INSULIN LISPRO 6 UNITS: 100 INJECTION, SOLUTION INTRAVENOUS; SUBCUTANEOUS at 11:06

## 2022-01-01 RX ADMIN — LORAZEPAM 1 MG: 2 INJECTION INTRAMUSCULAR; INTRAVENOUS at 04:02

## 2022-01-01 RX ADMIN — LORAZEPAM 0.5 MG: 2 INJECTION INTRAMUSCULAR; INTRAVENOUS at 14:41

## 2022-01-01 RX ADMIN — INSULIN GLARGINE 20 UNITS: 100 INJECTION, SOLUTION SUBCUTANEOUS at 08:02

## 2022-01-01 RX ADMIN — ACETAMINOPHEN 650 MG: 325 SUPPOSITORY RECTAL at 03:22

## 2022-01-01 RX ADMIN — PROPOFOL 15 MCG/KG/MIN: 10 INJECTION, EMULSION INTRAVENOUS at 07:24

## 2022-01-01 RX ADMIN — HYDROMORPHONE HYDROCHLORIDE 0.5 MG: 1 INJECTION, SOLUTION INTRAMUSCULAR; INTRAVENOUS; SUBCUTANEOUS at 00:25

## 2022-01-01 RX ADMIN — LABETALOL HYDROCHLORIDE 20 MG: 5 INJECTION, SOLUTION INTRAVENOUS at 16:08

## 2022-01-01 RX ADMIN — INSULIN LISPRO 6 UNITS: 100 INJECTION, SOLUTION INTRAVENOUS; SUBCUTANEOUS at 09:31

## 2022-01-01 RX ADMIN — HEPARIN SODIUM 5000 UNITS: 5000 INJECTION INTRAVENOUS; SUBCUTANEOUS at 21:59

## 2022-01-01 RX ADMIN — LORAZEPAM 1 MG: 2 INJECTION INTRAMUSCULAR; INTRAVENOUS at 16:10

## 2022-01-01 RX ADMIN — VANCOMYCIN HYDROCHLORIDE 1750 MG: 10 INJECTION, POWDER, LYOPHILIZED, FOR SOLUTION INTRAVENOUS at 13:50

## 2022-01-01 RX ADMIN — HYDRALAZINE HYDROCHLORIDE 10 MG: 20 INJECTION, SOLUTION INTRAMUSCULAR; INTRAVENOUS at 21:57

## 2022-01-01 RX ADMIN — CHLORHEXIDINE GLUCONATE 15 ML: 1.2 SOLUTION ORAL at 20:34

## 2022-01-01 RX ADMIN — AMLODIPINE BESYLATE 5 MG: 5 TABLET ORAL at 15:28

## 2022-01-01 RX ADMIN — ACETAMINOPHEN 650 MG: 650 SUSPENSION ORAL at 03:33

## 2022-01-01 RX ADMIN — ACETAMINOPHEN 650 MG: 650 SUSPENSION ORAL at 05:13

## 2022-01-01 RX ADMIN — GLYCOPYRROLATE 0.1 MG: 0.2 INJECTION INTRAMUSCULAR; INTRAVENOUS at 13:50

## 2022-01-01 RX ADMIN — SODIUM CHLORIDE, SODIUM GLUCONATE, SODIUM ACETATE, POTASSIUM CHLORIDE, MAGNESIUM CHLORIDE, SODIUM PHOSPHATE, DIBASIC, AND POTASSIUM PHOSPHATE 1000 ML: .53; .5; .37; .037; .03; .012; .00082 INJECTION, SOLUTION INTRAVENOUS at 12:42

## 2022-01-01 RX ADMIN — CHLORHEXIDINE GLUCONATE 15 ML: 1.2 SOLUTION ORAL at 22:00

## 2022-01-01 RX ADMIN — ATORVASTATIN CALCIUM 40 MG: 40 TABLET, FILM COATED ORAL at 18:05

## 2022-01-01 RX ADMIN — CHLORTHALIDONE 25 MG: 25 TABLET ORAL at 08:13

## 2022-01-01 RX ADMIN — POTASSIUM CHLORIDE 40 MEQ: 29.8 INJECTION, SOLUTION INTRAVENOUS at 08:00

## 2022-01-01 RX ADMIN — HYDROMORPHONE HYDROCHLORIDE 0.5 MG: 1 INJECTION, SOLUTION INTRAMUSCULAR; INTRAVENOUS; SUBCUTANEOUS at 16:39

## 2022-01-01 RX ADMIN — POLYETHYLENE GLYCOL 3350 17 G: 17 POWDER, FOR SOLUTION ORAL at 08:13

## 2022-01-01 RX ADMIN — CHLORHEXIDINE GLUCONATE 15 ML: 1.2 SOLUTION ORAL at 20:00

## 2022-01-01 RX ADMIN — CHLORHEXIDINE GLUCONATE 15 ML: 1.2 SOLUTION ORAL at 08:49

## 2022-01-01 RX ADMIN — LABETALOL HYDROCHLORIDE 20 MG: 5 INJECTION, SOLUTION INTRAVENOUS at 19:39

## 2022-01-01 RX ADMIN — CITALOPRAM HYDROBROMIDE 10 MG: 10 TABLET ORAL at 08:35

## 2022-01-01 RX ADMIN — HYDROMORPHONE HYDROCHLORIDE 0.5 MG: 1 INJECTION, SOLUTION INTRAMUSCULAR; INTRAVENOUS; SUBCUTANEOUS at 22:54

## 2022-08-26 ENCOUNTER — APPOINTMENT (EMERGENCY)
Dept: CT IMAGING | Facility: HOSPITAL | Age: 62
End: 2022-08-26
Payer: COMMERCIAL

## 2022-08-26 ENCOUNTER — HOSPITAL ENCOUNTER (EMERGENCY)
Facility: HOSPITAL | Age: 62
End: 2022-08-26
Attending: SURGERY
Payer: COMMERCIAL

## 2022-08-26 ENCOUNTER — APPOINTMENT (OUTPATIENT)
Dept: RADIOLOGY | Facility: HOSPITAL | Age: 62
End: 2022-08-26
Payer: COMMERCIAL

## 2022-08-26 VITALS
SYSTOLIC BLOOD PRESSURE: 155 MMHG | TEMPERATURE: 97.7 F | HEART RATE: 107 BPM | RESPIRATION RATE: 16 BRPM | OXYGEN SATURATION: 92 % | WEIGHT: 181.22 LBS | DIASTOLIC BLOOD PRESSURE: 85 MMHG

## 2022-08-26 DIAGNOSIS — I63.9 STROKE (HCC): Primary | ICD-10-CM

## 2022-08-26 PROBLEM — I61.9 HEMORRHAGIC STROKE (HCC): Status: ACTIVE | Noted: 2022-01-01

## 2022-08-26 LAB
ABO GROUP BLD: NORMAL
ANION GAP SERPL CALCULATED.3IONS-SCNC: 11 MMOL/L (ref 4–13)
APTT PPP: 24 SECONDS (ref 23–37)
BASE EXCESS BLDA CALC-SCNC: 3 MMOL/L (ref -2–3)
BLD GP AB SCN SERPL QL: NEGATIVE
BUN SERPL-MCNC: 16 MG/DL (ref 5–25)
CA-I BLD-SCNC: 1.11 MMOL/L (ref 1.12–1.32)
CALCIUM SERPL-MCNC: 8.9 MG/DL (ref 8.4–10.2)
CARDIAC TROPONIN I PNL SERPL HS: 5 NG/L
CHLORIDE SERPL-SCNC: 101 MMOL/L (ref 96–108)
CO2 SERPL-SCNC: 25 MMOL/L (ref 21–32)
CREAT SERPL-MCNC: 0.92 MG/DL (ref 0.6–1.3)
ERYTHROCYTE [DISTWIDTH] IN BLOOD BY AUTOMATED COUNT: 12.7 % (ref 11.6–15.1)
GFR SERPL CREATININE-BSD FRML MDRD: 88 ML/MIN/1.73SQ M
GLUCOSE SERPL-MCNC: 311 MG/DL (ref 65–140)
GLUCOSE SERPL-MCNC: 323 MG/DL (ref 65–140)
HCO3 BLDA-SCNC: 27.7 MMOL/L (ref 24–30)
HCT VFR BLD AUTO: 49.7 % (ref 36.5–49.3)
HCT VFR BLD CALC: 50 % (ref 36.5–49.3)
HGB BLD-MCNC: 17.5 G/DL (ref 12–17)
HGB BLDA-MCNC: 17 G/DL (ref 12–17)
INR PPP: 1.04 (ref 0.84–1.19)
MCH RBC QN AUTO: 30 PG (ref 26.8–34.3)
MCHC RBC AUTO-ENTMCNC: 35.2 G/DL (ref 31.4–37.4)
MCV RBC AUTO: 85 FL (ref 82–98)
PCO2 BLD: 29 MMOL/L (ref 21–32)
PCO2 BLD: 43.4 MM HG (ref 42–50)
PH BLD: 7.41 [PH] (ref 7.3–7.4)
PLATELET # BLD AUTO: 288 THOUSANDS/UL (ref 149–390)
PMV BLD AUTO: 10.8 FL (ref 8.9–12.7)
PO2 BLD: 67 MM HG (ref 35–45)
POTASSIUM BLD-SCNC: 3.8 MMOL/L (ref 3.5–5.3)
POTASSIUM SERPL-SCNC: 3.7 MMOL/L (ref 3.5–5.3)
PROTHROMBIN TIME: 13.8 SECONDS (ref 11.6–14.5)
RBC # BLD AUTO: 5.84 MILLION/UL (ref 3.88–5.62)
RH BLD: POSITIVE
SAO2 % BLD FROM PO2: 93 % (ref 60–85)
SODIUM BLD-SCNC: 140 MMOL/L (ref 136–145)
SODIUM SERPL-SCNC: 137 MMOL/L (ref 135–147)
SPECIMEN EXPIRATION DATE: NORMAL
SPECIMEN SOURCE: ABNORMAL
WBC # BLD AUTO: 6.77 THOUSAND/UL (ref 4.31–10.16)

## 2022-08-26 PROCEDURE — 85610 PROTHROMBIN TIME: CPT | Performed by: SURGERY

## 2022-08-26 PROCEDURE — 96376 TX/PRO/DX INJ SAME DRUG ADON: CPT

## 2022-08-26 PROCEDURE — 84132 ASSAY OF SERUM POTASSIUM: CPT

## 2022-08-26 PROCEDURE — 82803 BLOOD GASES ANY COMBINATION: CPT

## 2022-08-26 PROCEDURE — 85014 HEMATOCRIT: CPT

## 2022-08-26 PROCEDURE — 80048 BASIC METABOLIC PNL TOTAL CA: CPT | Performed by: SURGERY

## 2022-08-26 PROCEDURE — 96374 THER/PROPH/DIAG INJ IV PUSH: CPT

## 2022-08-26 PROCEDURE — 70498 CT ANGIOGRAPHY NECK: CPT

## 2022-08-26 PROCEDURE — NC001 PR NO CHARGE: Performed by: EMERGENCY MEDICINE

## 2022-08-26 PROCEDURE — 96375 TX/PRO/DX INJ NEW DRUG ADDON: CPT

## 2022-08-26 PROCEDURE — 85027 COMPLETE CBC AUTOMATED: CPT | Performed by: SURGERY

## 2022-08-26 PROCEDURE — 86850 RBC ANTIBODY SCREEN: CPT | Performed by: SURGERY

## 2022-08-26 PROCEDURE — 82330 ASSAY OF CALCIUM: CPT

## 2022-08-26 PROCEDURE — 86901 BLOOD TYPING SEROLOGIC RH(D): CPT | Performed by: SURGERY

## 2022-08-26 PROCEDURE — 99285 EMERGENCY DEPT VISIT HI MDM: CPT

## 2022-08-26 PROCEDURE — NC001 PR NO CHARGE: Performed by: NEUROLOGICAL SURGERY

## 2022-08-26 PROCEDURE — 86900 BLOOD TYPING SEROLOGIC ABO: CPT | Performed by: SURGERY

## 2022-08-26 PROCEDURE — 84295 ASSAY OF SERUM SODIUM: CPT

## 2022-08-26 PROCEDURE — 82947 ASSAY GLUCOSE BLOOD QUANT: CPT

## 2022-08-26 PROCEDURE — 70496 CT ANGIOGRAPHY HEAD: CPT

## 2022-08-26 PROCEDURE — 96365 THER/PROPH/DIAG IV INF INIT: CPT

## 2022-08-26 PROCEDURE — 36415 COLL VENOUS BLD VENIPUNCTURE: CPT | Performed by: SURGERY

## 2022-08-26 PROCEDURE — 85730 THROMBOPLASTIN TIME PARTIAL: CPT | Performed by: SURGERY

## 2022-08-26 PROCEDURE — 71045 X-RAY EXAM CHEST 1 VIEW: CPT

## 2022-08-26 PROCEDURE — 84484 ASSAY OF TROPONIN QUANT: CPT | Performed by: SURGERY

## 2022-08-26 PROCEDURE — 99291 CRITICAL CARE FIRST HOUR: CPT | Performed by: PSYCHIATRY & NEUROLOGY

## 2022-08-26 PROCEDURE — 99291 CRITICAL CARE FIRST HOUR: CPT | Performed by: SURGERY

## 2022-08-26 RX ORDER — ONDANSETRON 2 MG/ML
1 INJECTION INTRAMUSCULAR; INTRAVENOUS ONCE
Status: COMPLETED | OUTPATIENT
Start: 2022-08-26 | End: 2022-08-26

## 2022-08-26 RX ORDER — HYDRALAZINE HYDROCHLORIDE 20 MG/ML
INJECTION INTRAMUSCULAR; INTRAVENOUS CODE/TRAUMA/SEDATION MEDICATION
Status: COMPLETED | OUTPATIENT
Start: 2022-08-26 | End: 2022-08-26

## 2022-08-26 RX ORDER — ONDANSETRON 2 MG/ML
INJECTION INTRAMUSCULAR; INTRAVENOUS CODE/TRAUMA/SEDATION MEDICATION
Status: COMPLETED | OUTPATIENT
Start: 2022-08-26 | End: 2022-08-26

## 2022-08-26 RX ORDER — HYDRALAZINE HYDROCHLORIDE 20 MG/ML
10 INJECTION INTRAMUSCULAR; INTRAVENOUS ONCE
Status: COMPLETED | OUTPATIENT
Start: 2022-08-26 | End: 2022-08-26

## 2022-08-26 RX ADMIN — HYDRALAZINE HYDROCHLORIDE 10 MG: 20 INJECTION INTRAMUSCULAR; INTRAVENOUS at 11:16

## 2022-08-26 RX ADMIN — HYDRALAZINE HYDROCHLORIDE 10 MG: 20 INJECTION INTRAMUSCULAR; INTRAVENOUS at 11:15

## 2022-08-26 RX ADMIN — IOHEXOL 70 ML: 350 INJECTION, SOLUTION INTRAVENOUS at 11:05

## 2022-08-26 RX ADMIN — HYDRALAZINE HYDROCHLORIDE 5 MG: 20 INJECTION, SOLUTION INTRAMUSCULAR; INTRAVENOUS at 10:40

## 2022-08-26 RX ADMIN — ONDANSETRON 4 MG: 2 INJECTION INTRAMUSCULAR; INTRAVENOUS at 10:46

## 2022-08-26 RX ADMIN — SODIUM CHLORIDE 1 MG/HR: 0.9 INJECTION, SOLUTION INTRAVENOUS at 11:14
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
## 2022-08-26 NOTE — ED NOTES
Patient transferred out to [place] via [facility] care team   Family took patients belongings  Report given to transport crew and called made to ICU at [place] report given to Alexis Reyes RN  08/26/22 5453
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
## 2022-08-27 NOTE — NURSING NOTE
Patient's wife and wife's sister at bedside  Wife thought she could stay the night in ICU   Confirmed with daytime nurse during day, Poli Ness that he had spoke with them about visiting hours until 9 pm      Spoke with charge nurse regarding visitation
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
## 2022-08-27 NOTE — PLAN OF CARE
Problem: MOBILITY - ADULT  Goal: Maintain or return to baseline ADL function  Description: INTERVENTIONS:  -  Assess patient's ability to carry out ADLs; assess patient's baseline for ADL function and identify physical deficits which impact ability to perform ADLs (bathing, care of mouth/teeth, toileting, grooming, dressing, etc )  - Assess/evaluate cause of self-care deficits   - Assess range of motion  - Assess patient's mobility; develop plan if impaired  - Assess patient's need for assistive devices and provide as appropriate  - Encourage maximum independence but intervene and supervise when necessary  - Involve family in performance of ADLs  - Assess for home care needs following discharge   - Consider OT consult to assist with ADL evaluation and planning for discharge  - Provide patient education as appropriate  Outcome: Progressing  Goal: Maintains/Returns to pre admission functional level  Description: INTERVENTIONS:  - Perform BMAT or MOVE assessment daily    - Set and communicate daily mobility goal to care team and patient/family/caregiver  - Collaborate with rehabilitation services on mobility goals if consulted  - Perform Range of Motion 2  times a day  - Reposition patient every 2 hours    -- Out of bed for toileting  - Record patient progress and toleration of activity level   Outcome: Progressing

## 2022-08-27 NOTE — PROGRESS NOTES
Progress Note - Neurosurgery   Juarez Ayala 57 y o  male MRN: [id number]  Unit/Bed#: ICU 01 Encounter: [id number]    Assessment/Plan:    · R thal ICH with IVH, ICH score 2  · F/U scan stable  · No imminent plan for EVD unless GCS declines and CT with worsening HCP  · Neurology management for CVA and f/u  · Suggest delayed CT in ~2 weeks to eval for delayed HCP  · Will sign off, call with questions  · D/W CCM      all current active meds have been reviewed and current meds:   Current Facility-Administered Medications   Medication Dose Route Frequency    acetaminophen (TYLENOL) tablet 650 mg  650 mg Oral Q6H PRN    atorvastatin (LIPITOR) tablet 40 mg  40 mg Oral After Dinner    bisacodyl (DULCOLAX) rectal suppository 10 mg  10 mg Rectal Daily PRN    chlorhexidine (PERIDEX) 0 12 % oral rinse 15 mL  15 mL Mouth/Throat Q12H [address]    hydrALAZINE (APRESOLINE) injection 10 mg  10 mg Intravenous Q4H PRN    insulin regular (HumuLIN R,NovoLIN R) 1 Units/mL in sodium chloride 0 9 % 100 mL infusion  0 3-21 Units/hr Intravenous Titrated    labetalol (NORMODYNE) injection 10 mg  10 mg Intravenous Q4H PRN    niCARdipine (CARDENE) 25 mg (STANDARD CONCENTRATION) in sodium chloride 0 9% 250 mL  1-15 mg/hr Intravenous Titrated    ondansetron (ZOFRAN) injection 4 mg  4 mg Intravenous Q6H PRN    oxyCODONE (ROXICODONE) IR tablet 5 mg  5 mg Oral Q4H PRN    oxyCODONE (ROXICODONE) IR tablet 7 5 mg  7 5 mg Oral Q4H PRN    polyethylene glycol (MIRALAX) packet 17 g  17 g Oral Daily    potassium-sodium phosphateS (K-PHOS,PHOSPHA 250) -250 mg tablet 2 tablet  2 tablet Oral Once    senna-docusate sodium (SENOKOT S) 8 6-50 mg per tablet 2 tablet  2 tablet Oral BID    sodium chloride 0 9 % infusion  100 mL/hr Intravenous Continuous       Objective:     Exam:     Vitals: Blood pressure 126/65, pulse 102, temperature 99 7 °F (37 6 °C), resp  rate 17, height 5' 6" (1 676 m), weight 82 1 kg (181 lb), SpO2 96 %  ,Body mass index is 29 21 kg/m²  Physical Exam  Constitutional:       Appearance: He is well-developed  HENT:      Head: Normocephalic  Eyes:      General: No scleral icterus  Cardiovascular:      Rate and Rhythm: Tachycardia present  Pulmonary:      Effort: Pulmonary effort is normal    Musculoskeletal:      Cervical back: Normal range of motion  Skin:     General: Skin is dry  Neurological:      Mental Status: He is alert  Cranial Nerves: No cranial nerve deficit  Neurologic Exam  FC on R, dense L hemiparesis    MDM:    Lab Results:    Results from last 7 days   Lab Units 08/27/22  0512 08/26/22  1043 08/26/22  1042   WBC Thousand/uL 12 83*  --  6 77   HEMOGLOBIN g/dL 15 0  --  17 5*   I STAT HEMOGLOBIN g/dl  --  17 0  --    HEMATOCRIT % 43 7  --  49 7*   HEMATOCRIT, ISTAT %  --  50*  --    PLATELETS Thousands/uL 274  --  288     Results from last 7 days   Lab Units 08/27/22  0512 08/26/22  1043 08/26/22  1042   SODIUM mmol/L 142  --  137   POTASSIUM mmol/L 4 0  --  3 7   CHLORIDE mmol/L 112*  --  101   CO2 mmol/L 26  --  25   CO2, I-STAT mmol/L  --  29  --    BUN mg/dL 31*  --  16   CREATININE mg/dL 1 53*  --  0 92   CALCIUM mg/dL 8 8  --  8 9   ALK PHOS U/L 75  --   --    ALT U/L 40  --   --    AST U/L 12  --   --    GLUCOSE RANDOM mg/dL 235*  --  311*     Results from last 7 days   Lab Units 08/26/22  1042   INR  1 04   PTT seconds 24       MDM:     IMPRESSION:     Stable right caudothalamic hemorrhage with intraventricular extension compared to most immediate prior study  No midline shift or new areas of hemorrhage  Stable ventricular size and configuration  Imaging Studies: I have personally reviewed pertinent reports
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
## 2022-08-27 NOTE — SPEECH THERAPY NOTE
Speech Language/Pathology    Speech/Language Pathology Progress Note    Patient Name: Dayana Otto  [id number] Date: 8/27/2022     Problem List  Principal Problem:    Acute right thalamic intraparenchymal hemorrhage       Past Medical History  No past medical history on file  Past Surgical History  No past surgical history on file  Subjective:  Pt seen for dysphagia tx  Pt was positioned upright in bed  Wife at bedside  Pt lethargic but minimally arousable  Objective:  Reviewed chart, spoke with RN  If pt is unable to start PO diet today, physician is considering NGT/keofed  Oral care completed  Pt able to open eyes intermittently only with max stimulation  He was able to protrude tongue, lateralize minimally, and pucker lips  He was unable to smile, but there does appear to be a L facial droop  Unable to elicit a cough upon command  Pt was given one ice chip, with reduced oral opening, reduced bolus retrieval, and minimal manipulation of the ice chip  The ice chip melted, but pt did not trigger a pharyngeal swallow  At this point pt did not open his eyes despite max verbal/tactile cues, therefore PO trials were stopped  Educated pt's wife on recommendation for continued NPO, potential need for short term alternate feeding  Discussed with RN  Assessment:  Pt was minimally responsive, only opens eyes briefly to max stimulation  Minimal  Acceptance of the ice chip was minimal, minimal oral manipulation of the ice chip, no pharyngeal swallow observed  No verbalizations today, but pt able to follow a few simple commands  Pt remains at high risk for aspiration, given poor RADHA  Plan/Recommendations:  Continue NPO, with frequent oral care  May need to consider short term alternate feeding  ST to see for dysphagia tx
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
## 2022-08-27 NOTE — PLAN OF CARE
Problem: MOBILITY - ADULT  Goal: Maintain or return to baseline ADL function  Description: INTERVENTIONS:  -  Assess patient's ability to carry out ADLs; assess patient's baseline for ADL function and identify physical deficits which impact ability to perform ADLs (bathing, care of mouth/teeth, toileting, grooming, dressing, etc )  - Assess/evaluate cause of self-care deficits   - Assess range of motion  - Assess patient's mobility; develop plan if impaired  - Assess patient's need for assistive devices and provide as appropriate  - Encourage maximum independence but intervene and supervise when necessary  - Involve family in performance of ADLs  - Assess for home care needs following discharge   - Consider OT consult to assist with ADL evaluation and planning for discharge  - Provide patient education as appropriate  Outcome: Progressing  Goal: Maintains/Returns to pre admission functional level  Description: INTERVENTIONS:  - Perform BMAT or MOVE assessment daily    - Set and communicate daily mobility goal to care team and patient/family/caregiver  - Collaborate with rehabilitation services on mobility goals if consulted  - Perform Range of Motion 2 times a day  - Reposition patient every 2 hours    - - Out of bed for toileting  - Record patient progress and toleration of activity level   Outcome: Progressing
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
## 2022-08-28 NOTE — PHYSICAL THERAPY NOTE
PHYSICAL THERAPY Treatment NOTE      Patient Name: Chad Blanton  XVPJA'[id number] Date: 8/28/2022 08/28/22 0935   PT Last Visit   PT Visit Date 08/28/22   Note Type   Note Type Treatment   Pain Assessment   [facility] Repositioned; Environmental changes   Pain Rating: FLACC (Rest) - Face 0   Pain Rating: FLACC (Rest) - Legs 0   Pain Rating: FLACC (Rest) - Activity 0   Pain Rating: FLACC (Rest) - Cry 0   Pain Rating: FLACC (Rest) - Consolability 0   Score: FLACC (Rest) 0   Pain Rating: FLACC (Activity) - Face 0   Pain Rating: FLACC (Activity) - Legs 0   Pain Rating: FLACC (Activity) - Activity 0   Pain Rating: FLACC (Activity) - Cry 0   Pain Rating: FLACC (Activity) - Consolability 0   Score: FLACC (Activity) 0   Restrictions/Precautions   Weight Bearing Precautions Per Order No   Other Precautions Cognitive; Chair Alarm; Bed Alarm;Multiple lines;Telemetry; Fall Risk;Aspiration  (L hemiparesis and neglect, postural instability in sitting, knee buckling in standing)   General   Chart Reviewed Yes   Additional Pertinent History Pt  is a 56 yo M who presents with Acute Right Thalamic intraparenchymal hemorrhage  Family/Caregiver Present Yes  (spouse and son)   Cognition   Overall Cognitive Status Impaired   Arousal/Participation Responsive   Attention Attends with cues to redirect   Orientation Level Unable to assess   Memory Unable to assess   Following Commands Follows one step commands with increased time or repetition   Comments Pt  non-verbal follows simple 1 step commands and uses body language i e  thumbs up or down to communicate at this time  Subjective   Subjective Pt  agreeable to out of bed mobility with use of thumbs up signal   Bed Mobility   Rolling R 2  Maximal assistance   Additional items Assist x 1; Increased time required   Rolling L 2  Maximal assistance   Additional items Assist x 1; Increased time required   Supine to Sit 2  Maximal assistance   Additional items Assist x 1; Increased time required   Sit to Supine 2  Maximal assistance   Additional items Assist x 1; Increased time required   Additional Comments Pt  tolerated sitting at EOB with mostly modA to maintain balance, instructed on use of RUE and bed rail to maintain balance, able to stabilize and maintain approx  5 seconds  Transfers   Sit to Stand 2  Maximal assistance   Additional items Assist x 2   Stand to Sit 2  Maximal assistance   Additional items Assist x 2; Increased time required   Stand pivot 2  Maximal assistance   Additional items Assist x 2; Increased time required   Additional Comments LLE blocking, Stand and Pivot transfer with drop arm recliner   Balance   Static Sitting Poor   Dynamic Sitting Poor   Static Standing Poor -   Dynamic Standing Poor -   Ambulatory Zero   Endurance Deficit   Endurance Deficit Yes   Endurance Deficit Description postural and balance degradation in sitting with fatigue   Activity Tolerance   Activity Tolerance Patient limited by fatigue   Nurse Maricel Mayo RN present and assisted with mobility   Assessment   Prognosis Fair   Problem List Decreased strength;Decreased range of motion;Decreased endurance; Impaired balance;Decreased mobility; Decreased coordination;Decreased cognition;Decreased safety awareness; Impaired tone   Assessment Pt  is a 58 yo M who presents with Acute Right Thalamic intraparenchymal hemorrhage  Pt  Agreeable to PT session, Pt  Identified with full name and birthdate  Pt  Demonstrated increased functional independence and increased activity tolerance as evidenced above  Pt  Tolerated sitting on EOB with intermittently decreased physical assistance and improved overall activity tolerance assisting with SPT with improved WB and no knee buckling noted today with blocking  Pt   Is progressing towards goals, as expected and will benefit from continued skilled therapy to increase functional independence and aid patient in return to PLOF with decreased burden of care  D/C recommendation is rehab when medically appropriate  Barriers to Discharge Inaccessible home environment   Goals   STG Expiration Date 09/09/22   PT Treatment Day 1   Plan   Treatment/Interventions ADL retraining;Functional transfer training;LE strengthening/ROM; Therapeutic exercise; Endurance training;Cognitive reorientation;Equipment eval/education; Bed mobility;Gait training;Spoke to nursing;Spoke to case management   Progress Progressing toward goals   PT Frequency   (3-6x/wk)   Recommendation   PT Discharge Recommendation Post acute rehabilitation services   Equipment Recommended   (TBD)   AM-PAC Basic Mobility Inpatient   Turning in Bed Without Bedrails 2   Lying on Back to Sitting on Edge of Flat Bed 2   Moving Bed to Chair 2   Standing Up From Chair 2   Walk in Room 1   Climb 3-5 Stairs 1   Basic Mobility Inpatient Raw Score 10   Turning Head Towards Sound 3   Follow Simple Instructions 3   Low Function Basic Mobility Raw Score 16   Low Function Basic Mobility Standardized Score 25 72   Highest Level Of Mobility   -[facility] Goal 4: Move to chair/commode   -[facility] Achieved 4: Move to chair/commode   End of Consult   Patient Position at End of Consult Bedside chair; All needs within reach;Bed/Chair alarm activated     The patient's AM-PAC Basic Mobility Inpatient Short Form Raw Score is 10  A Raw score of less than or equal to 16 suggests the patient may benefit from discharge to post-acute rehabilitation services  Please also refer to the recommendation of the Physical Therapist for safe discharge planning      Wilian Junior, PT, DPT 8/28/2022

## 2022-08-28 NOTE — NURSING NOTE
Patient to MRI for ordered scan of brain  Patient tolerated the diagnostic test  Patient returned to ICU-001 with stable VS  Patient did not require any medication
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
## 2022-08-28 NOTE — NUTRITION
08/28/22 1127   Recommendations/Interventions   Recommendations to Provider increase goal rate of jevity 1 2 to 65ml/hr which will provide 1560ml, 1872kcal (23kcal/kg), 86g protein (1g/kg), 1259ml free H2O from TF formula, 264g CHO

## 2022-08-28 NOTE — PROGRESS NOTES
Daily Progress Note - Critical Care   Raquel Santiago 57 y o  male MRN: [id number]  Unit/Bed#: ICU 01 Encounter: [id number]        ----------------------------------------------------------------------------------------  HPI/24hr events:  Patient was slightly improved neurologic exam, enteral nutrition started to nasogastric tube, Sandoval catheter placed, blood sugars improving on insulin infusion, off nicardipine infusion, MRI completed overnight    ---------------------------------------------------------------------------------------  SUBJECTIVE  Okay    Review of Systems   Unable to perform ROS: Mental status change       ---------------------------------------------------------------------------------------  Assessment and Plan:    Neuro:    Diagnosis:  Right thalamic intracranial hemorrhage with intraventricular extension, acute encephalopathy  o Plan:  Frequent neurologic examinations, neurosurgery has signed off common continue p r n  Tylenol/oxycodone, pending results of MRI, appreciate Neurology recommendations      CV:    Diagnosis:  Hypertension, hyperlipidemia  o Plan:  Continue amlodipine with p r n   Hydralazine and labetalol, patient has transition off nicardipine, echo completed      Pulm:  o Plan:  Encourage pulmonary hygiene is patient able to participate      GI:    Diagnosis:  Dysphagia  o Plan:  Continue enteral access, appreciate speech recommendations, continue bowel regimen    :    Diagnosis:  Acute urinary retention  o Plan:  Continue Sandoval until patient is slightly more mobile and create attempt voiding trial, close intake and output, daily weights, trend serum creatinine, awaiting renal ultrasound      F/E/N:    Plan:  Continue enteral nutrition, replete electrolytes as necessary, speech therapy following for advancement of diet      Heme/Onc:   o Plan:  Discuss with neurosurgery/neurology beginning DVT prophylaxis pending results of MRI      Endo:    Diagnosis:  New onset diabetes  o Plan:  Consider transitioning off insulin infusion, would place endocrine consult    ID:   o Plan:  Follow temperature and white count      MSK/Skin:   o Plan:  Frequent turning repositioning, out of bed as able, consult PT/OT, consult physical medicine and rehabilitation    Patient appropriate for transfer out of the ICU today?: Patient does not meet criteria for referral to the ICU Follow-Up Clinic; referral has not been made  Disposition: Transfer to Stepdown Level 2  Code Status: Level 1 - Full Code  ---------------------------------------------------------------------------------------  ICU CORE MEASURES    Prophylaxis   VTE Pharmacologic Prophylaxis: Pharmacologic VTE Prophylaxis contraindicated due to Intracranial hemorrhage  VTE Mechanical Prophylaxis: sequential compression device  Stress Ulcer Prophylaxis: Prophylaxis Not Indicated     ABCDE Protocol (if indicated)  Plan to perform spontaneous awakening trial today? Not applicable  Plan to perform spontaneous breathing trial today? Not applicable  Obvious barriers to extubation? Not applicable  CAM-ICU:  Unable to assess    Invasive Devices Review  Invasive Devices  Report    Peripheral Intravenous Line  Duration           Peripheral IV 22 Distal;Left;Upper;Ventral (anterior) Arm 1 day    Peripheral IV 22 Dorsal (posterior); Right Hand 1 day    Peripheral IV 22 Right Antecubital 1 day          Drain  Duration           NG/OG/Enteral Tube Nasogastric Right nare <1 day    Urethral Catheter 16 Fr  <1 day              Can any invasive devices be discontinued today?  No  ---------------------------------------------------------------------------------------  OBJECTIVE    Vitals   Vitals:    22   BP: 143/74 141/70 150/80 143/74   Pulse: 94 92 100 94   Resp: 14 15 18 19   Temp:       TempSrc:       SpO2: 98% 96% 97% 96%   Weight:       Height:         Temp (24hrs), Av 2 °F (37 3 °C), Min:98 5 °F (36 9 °C), Max:99 9 °F (37 7 °C)  Current: Temperature: 98 5 °F (36 9 °C)  HR: 100  BP: 150/86  RR: 22  SpO2:  95% on room air    Respiratory:  SpO2: SpO2: 96 %, SpO2 Activity: SpO2 Activity: At Rest, SpO2 Device: O2 Device: None (Room air)       Invasive/non-invasive ventilation settings   Respiratory  Report   Lab Data (Last 4 hours)    None         O2/Vent Data (Last 4 hours)    None                Physical Exam  Constitutional:       Appearance: He is ill-appearing  HENT:      Head: Normocephalic and atraumatic  Mouth/Throat:      Mouth: Mucous membranes are dry  Eyes:      Pupils: Pupils are equal, round, and reactive to light  Cardiovascular:      Rate and Rhythm: Regular rhythm  Tachycardia present  Pulses: Normal pulses  Pulmonary:      Effort: Pulmonary effort is normal  No respiratory distress  Breath sounds: Normal breath sounds  Abdominal:      General: Abdomen is flat  Bowel sounds are normal  There is no distension  Palpations: Abdomen is soft  Tenderness: There is no abdominal tenderness  Genitourinary:     Comments: Sandoval in place with significant amount of clear yellow urine  Musculoskeletal:         General: No signs of injury  Right lower leg: No edema  Left lower leg: No edema  Skin:     General: Skin is warm and dry  Neurological:      Mental Status: He is easily aroused  GCS: GCS eye subscore is 3  GCS verbal subscore is 3  GCS motor subscore is 6        Comments: Patient responds request in right upper and right lower extremity, able to lift at right hip against gravity, able to lift at right hip and elbow against gravity, able to show 2 and 3 fingers on right hand  No witness effort to noxious stimuli on left side         Laboratory and Diagnostics:  Results from last 7 days   Lab Units 08/27/22  0512 08/26/22  1043 08/26/22  1042   WBC Thousand/uL 12 83*  --  6 77   HEMOGLOBIN g/dL 15 0  --  17 5*   I STAT HEMOGLOBIN g/dl  --  17 0  --    HEMATOCRIT % 43 7  --  49 7*   HEMATOCRIT, ISTAT %  --  50*  --    PLATELETS Thousands/uL 274  --  288   NEUTROS PCT % 92*  --   --    MONOS PCT % 3*  --   --      Results from last 7 days   Lab Units 08/27/22  1728 08/27/22  0512 08/26/22  1043 08/26/22  1042   SODIUM mmol/L 143 142  --  137   POTASSIUM mmol/L 4 3 4 0  --  3 7   CHLORIDE mmol/L 114* 112*  --  101   CO2 mmol/L 25 26  --  25   CO2, I-STAT mmol/L  --   --  29  --    ANION GAP mmol/L 4 4  --  11   BUN mg/dL 28* 31*  --  16   CREATININE mg/dL 1 16 1 53*  --  0 92   CALCIUM mg/dL 8 1* 8 8  --  8 9   GLUCOSE RANDOM mg/dL 164* 235*  --  311*   ALT U/L  --  40  --   --    AST U/L  --  12  --   --    ALK PHOS U/L  --  75  --   --    ALBUMIN g/dL  --  3 3*  --   --    TOTAL BILIRUBIN mg/dL  --  2 00*  --   --      Results from last 7 days   Lab Units 08/27/22  0512   MAGNESIUM mg/dL 2 1   PHOSPHORUS mg/dL 2 3      Results from last 7 days   Lab Units 08/26/22  1042   INR  1 04   PTT seconds 24              ABG:    VBG:          Micro        EKG:  Review of telemetry demonstrates sinus tachycardia  Imaging:  I have personally reviewed pertinent reports  and I have personally reviewed pertinent films in PACS    Intake and Output  I/O       08/26 0701  08/27 0700 08/27 0701  08/28 0700    I V  (mL/kg) 1913 2 (23 3) 2176 2 (26 5)    NG/GT  120    IV Piggyback 100 250    Feedings  80    Total Intake(mL/kg) 2013 2 (24 5) 2626 2 (32)    Urine (mL/kg/hr) 750 750 (0 4)    Stool 0     Total Output 750 750    Net +1263 2 +1876 2          Unmeasured Stool Occurrence 1 x         UOP:  Unmeasured    Height and Weights   Height: 5' 6" (035 4 cm) ('s license scanned into media)  IBW (Ideal Body Weight): 63 8 kg  Body mass index is 29 21 kg/m²    Weight (last 2 days)     Date/Time Weight    08/26/22 1615 82 1 (181)            Nutrition       Diet Orders   (From admission, onward)             Start     Ordered    08/27/22 1121  Diet Enteral/Parenteral; Tube Feeding No Oral Diet; Jevity 1 2 Evangelista; Continuous; 50  Diet effective now        References:    Nutrtion Support Algorithm Enteral vs  Parenteral   Question Answer Comment   Diet Type Enteral/Parenteral    Enteral/Parenteral Tube Feeding No Oral Diet    Tube Feeding Formula: Jevity 1 2 Evangelista    Bolus/Cyclic/Continuous Continuous    Tube Feeding Goal Rate (mL/hr): 50    RD to adjust diet per protocol? Yes        08/27/22 1121              TF currently running at 50 ml/hr with a goal of 50 ml/hr   Formula:  Jevity 1 2      Active Medications  Scheduled Meds:  Current Facility-Administered Medications   Medication Dose Route Frequency Provider Last Rate    acetaminophen  650 mg Oral Q6H PRN Mary Raines MD      amLODIPine  5 mg Oral Daily Parul Munoz, DO      atorvastatin  40 mg Oral After [employer], DO      bisacodyl  10 mg Rectal Daily PRN Belén Alexandre MD      chlorhexidine  15 mL Mouth/Throat Q12H [facility] Mary Raines MD      hydrALAZINE  10 mg Intravenous Q4H PRN Belén Alexandre MD      insulin regular (HumuLIN R,NovoLIN R) infusion  0 3-21 Units/hr Intravenous Titrated Belén Alexandre MD 3 Units/hr (08/27/22 2030)    labetalol  10 mg Intravenous Q4H PRN Belén Alexandre MD      niCARdipine  1-15 mg/hr Intravenous Titrated Belén Alexandre MD Stopped (08/27/22 1823)    ondansetron  4 mg Intravenous Q6H PRN Belén Alexandre MD      oxyCODONE  5 mg Oral Q4H PRN Belén Alexandre MD      oxyCODONE  7 5 mg Oral Q4H PRN Christine Shirley MD      polyethylene glycol  17 g Oral Daily Belén Alexandre MD      senna-docusate sodium  2 tablet Oral BID Belén Alexandre MD      sodium chloride  100 mL/hr Intravenous Continuous Christine Shirley  mL/hr (08/27/22 1023)     Continuous Infusions:  insulin regular (HumuLIN R,NovoLIN R) infusion, 0 3-21 Units/hr, Last Rate: 3 Units/hr (08/27/22 2030)  niCARdipine, 1-15 mg/hr, Last Rate: Stopped (08/27/22 1823)  sodium chloride, 100 mL/hr, Last Rate: 100 mL/hr (08/27/22 1023)      PRN Meds:   acetaminophen, 650 mg, Q6H PRN  bisacodyl, 10 mg, Daily PRN  hydrALAZINE, 10 mg, Q4H PRN  labetalol, 10 mg, Q4H PRN  ondansetron, 4 mg, Q6H PRN  oxyCODONE, 5 mg, Q4H PRN  oxyCODONE, 7 5 mg, Q4H PRN        Allergies   Not on File  ---------------------------------------------------------------------------------------  Advance Directive and Living Will:      Power of :    POLST:    ---------------------------------------------------------------------------------------  Care Time Delivered:   No Critical Care time spent     KIM Freeman      Portions of the record may have been created with voice recognition software  Occasional wrong word or "sound a like" substitutions may have occurred due to the inherent limitations of voice recognition software    Read the chart carefully and recognize, using context, where substitutions have occurred
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
## 2022-08-28 NOTE — PLAN OF CARE
Problem: PHYSICAL THERAPY ADULT  Goal: Performs mobility at highest level of function for planned discharge setting  See evaluation for individualized goals  Description: Treatment/Interventions: ADL retraining, Functional transfer training, LE strengthening/ROM, Elevations, Therapeutic exercise, Endurance training, Cognitive reorientation, Patient/family training, Equipment eval/education, Bed mobility, Gait training, Spoke to nursing, OT, Family, Compensatory technique education, Continued evaluation, Spoke to MD  Equipment Recommended:  (TBD)       See flowsheet documentation for full assessment, interventions and recommendations  Outcome: Progressing  Note: Prognosis: Fair  Problem List: Decreased strength, Decreased range of motion, Decreased endurance, Impaired balance, Decreased mobility, Decreased coordination, Decreased cognition, Decreased safety awareness, Impaired tone  Assessment: Pt  is a 56 yo M who presents with Acute Right Thalamic intraparenchymal hemorrhage  Pt  Agreeable to PT session, Pt  Identified with full name and birthdate  Pt  Demonstrated increased functional independence and increased activity tolerance as evidenced above  Pt  Tolerated sitting on EOB with intermittently decreased physical assistance and improved overall activity tolerance assisting with SPT with improved WB and no knee buckling noted today with blocking  Pt  Is progressing towards goals, as expected and will benefit from continued skilled therapy to increase functional independence and aid patient in return to PLOF with decreased burden of care  D/C recommendation is rehab when medically appropriate  Barriers to Discharge: Inaccessible home environment     PT Discharge Recommendation: Post acute rehabilitation services    See flowsheet documentation for full assessment

## 2022-08-28 NOTE — NURSING NOTE
At about 4:30 am patient was able to state his first name and age  Unsure of month: February, Year: shakes head no and When asked where he is: "States at home " When told where he is by the nurse: "Hospital," he shakes his head no  Moves the right side, Left side paralysis and unable to feel sensation  Patient does withdraw upward briskly to nail bed pressure in LLE and mildly in ULE  Droop on left, tongue towards left, patient has gaze to right with no ability to cross midline

## 2022-08-29 ENCOUNTER — TELEPHONE (OUTPATIENT)
Dept: NEUROSURGERY | Facility: CLINIC | Age: 62
End: 2022-08-29
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
## 2022-08-29 PROBLEM — E11.9 DIABETES MELLITUS (HCC): Status: ACTIVE | Noted: 2022-01-01

## 2022-08-29 NOTE — PROGRESS NOTES
08/29/22 1500   Clinical Encounter Type   Visited With Patient   Sacramental Encounters   Sacrament of Sick-Anointing Patient declined anointing   Sacrament Other Other (Comment)  (Rose Mckeon)
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
## 2022-08-29 NOTE — TELEPHONE ENCOUNTER
09/02/2022-PT STILL IN HOSPITAL    09/01/2022-PT STILL IN HOSPITAL    08/31/2022-PT STILL IN HOSPITAL    08/30/2022-PT [address]    08/29/2022-PT STILL IN HOSPITAL  09/21/2022 APT Echo Patel IN [facility] W/CT HEAD    Alosidora Nuñez PA-C   Please schedule 2 week hospital follow up with Ct head prior, AP solo   Thanks!

## 2022-08-29 NOTE — OCCUPATIONAL THERAPY NOTE
Occupational Therapy         Patient Name: Eli Garcia  [id number] Date: 8/29/2022 08/29/22 1000   OT Last Visit   OT Visit Date 08/29/22   Note Type   Note Type Cancelled Session   Cancel Reasons Patient off floor/test     Kavita Flores

## 2022-08-29 NOTE — ASSESSMENT & PLAN NOTE
Lab Results   Component Value Date    HGBA1C 9 8 (H) 08/27/2022     - Euglycemic goal  - Medical management per primary team

## 2022-08-29 NOTE — SPEECH THERAPY NOTE
Speech Language/Pathology    Speech/Language Pathology Progress Note    Patient Name: Raquel SALGUERO Date: 8/29/2022     Problem List  Principal Problem:    Acute right thalamic intraparenchymal hemorrhage  Active Problems:    Diabetes mellitus [facility])       Past Medical History  No past medical history on file  Past Surgical History  No past surgical history on file  Subjective:  Pt OOB to chair, wife present  Pt remains nonverbal unless requested to speak  Current Diet: npo, pulled out ng this morning   Objective:  Pt seen for ongoing dx dysphagia tx, trials w/ ice, puree, thin, nt  Oral care completed prior to initiating trials  Reduced opening for the tsp w/ reduced retrievals at times tor from the tsp  Fair seal on cup rim  Mult swallows at times, no immed coughing w/ initial sips of the nt,thin   + coughing w/ cup sip water on the sequential sips  Cued to check voice throughout, minimal wet quality but otherwise no gross wet vocal quality  Assessment:  Pt w/ improved mentation & participation with ongoing at least mild/mod oropharyngeal  Dysphagia  ? able silent aspiration given the pt's neuro status  Plan/Recommendations:  VBs prior to initiating diet      Ok for meds crush in puree for now
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
## 2022-08-29 NOTE — PLAN OF CARE
Problem: MOBILITY - ADULT  Goal: Maintain or return to baseline ADL function  Description: INTERVENTIONS:  -  Assess patient's ability to carry out ADLs; assess patient's baseline for ADL function and identify physical deficits which impact ability to perform ADLs (bathing, care of mouth/teeth, toileting, grooming, dressing, etc )  - Assess/evaluate cause of self-care deficits   - Assess range of motion  - Assess patient's mobility; develop plan if impaired  - Assess patient's need for assistive devices and provide as appropriate  - Encourage maximum independence but intervene and supervise when necessary  - Involve family in performance of ADLs  - Assess for home care needs following discharge   - Consider OT consult to assist with ADL evaluation and planning for discharge  - Provide patient education as appropriate  Outcome: Progressing  Goal: Maintains/Returns to pre admission functional level  Description: INTERVENTIONS:  - Perform BMAT or MOVE assessment daily    - Set and communicate daily mobility goal to care team and patient/family/caregiver  - Collaborate with rehabilitation services on mobility goals if consulted  - Perform Range of Motion multiple times a day  - Reposition patient every 2 hours    - Record patient progress and toleration of activity level   Outcome: Progressing     Problem: PAIN - ADULT  Goal: Verbalizes/displays adequate comfort level or baseline comfort level  Description: Interventions:  - Encourage patient to monitor pain and request assistance  - Assess pain using appropriate pain scale  - Administer analgesics based on type and severity of pain and evaluate response  - Implement non-pharmacological measures as appropriate and evaluate response  - Consider cultural and social influences on pain and pain management  - Notify physician/advanced practitioner if interventions unsuccessful or patient reports new pain  Outcome: Progressing     Problem: INFECTION - ADULT  Goal: Absence or prevention of progression during hospitalization  Description: INTERVENTIONS:  - Assess and monitor for signs and symptoms of infection  - Monitor lab/diagnostic results  - Monitor all insertion sites, i e  indwelling lines, tubes, and drains  - Monitor endotracheal if appropriate and nasal secretions for changes in amount and color  - Stark appropriate cooling/warming therapies per order  - Administer medications as ordered  - Instruct and encourage patient and family to use good hand hygiene technique  - Identify and instruct in appropriate isolation precautions for identified infection/condition  Outcome: Progressing  Goal: Absence of fever/infection during neutropenic period  Description: INTERVENTIONS:  - Monitor WBC    Outcome: Progressing     Problem: SAFETY ADULT  Goal: Maintain or return to baseline ADL function  Description: INTERVENTIONS:  -  Assess patient's ability to carry out ADLs; assess patient's baseline for ADL function and identify physical deficits which impact ability to perform ADLs (bathing, care of mouth/teeth, toileting, grooming, dressing, etc )  - Assess/evaluate cause of self-care deficits   - Assess range of motion  - Assess patient's mobility; develop plan if impaired  - Assess patient's need for assistive devices and provide as appropriate  - Encourage maximum independence but intervene and supervise when necessary  - Involve family in performance of ADLs  - Assess for home care needs following discharge   - Consider OT consult to assist with ADL evaluation and planning for discharge  - Provide patient education as appropriate  Outcome: Progressing  Goal: Maintains/Returns to pre admission functional level  Description: INTERVENTIONS:  - Perform BMAT or MOVE assessment daily    - Set and communicate daily mobility goal to care team and patient/family/caregiver     - Collaborate with rehabilitation services on mobility goals if consulted  - Perform Range of Motion multiple times a day   - Reposition patient every 2 hours  - Record patient progress and toleration of activity level   Outcome: Progressing  Goal: Patient will remain free of falls  Description: INTERVENTIONS:  - Educate patient/family on patient safety including physical limitations  - Instruct patient to call for assistance with activity   - Consult OT/PT to assist with strengthening/mobility   - Keep Call bell within reach  - Keep bed low and locked with side rails adjusted as appropriate  - Keep care items and personal belongings within reach  - Initiate and maintain comfort rounds  - Make Fall Risk Sign visible to staff  - Offer Toileting every 2 Hours, in advance of need  - Initiate/Maintain bed alarm  - Obtain necessary fall risk management equipment  - Apply yellow socks and bracelet for high fall risk patients  - Consider moving patient to room near nurses station  Outcome: Progressing     Problem: DISCHARGE PLANNING  Goal: Discharge to home or other facility with appropriate resources  Description: INTERVENTIONS:  - Identify barriers to discharge w/patient and caregiver  - Arrange for needed discharge resources and transportation as appropriate  - Identify discharge learning needs (meds, wound care, etc )  - Arrange for interpretive services to assist at discharge as needed  - Refer to Case Management Department for coordinating discharge planning if the patient needs post-hospital services based on physician/advanced practitioner order or complex needs related to functional status, cognitive ability, or social support system  Outcome: Progressing     Problem: Knowledge Deficit  Goal: Patient/family/caregiver demonstrates understanding of disease process, treatment plan, medications, and discharge instructions  Description: Complete learning assessment and assess knowledge base    Interventions:  - Provide teaching at level of understanding  - Provide teaching via preferred learning methods  Outcome: Progressing Problem: Nutrition/Hydration-ADULT  Goal: Nutrient/Hydration intake appropriate for improving, restoring or maintaining nutritional needs  Description: Monitor and assess patient's nutrition/hydration status for malnutrition  Collaborate with interdisciplinary team and initiate plan and interventions as ordered  Monitor patient's weight and dietary intake as ordered or per policy  Utilize nutrition screening tool and intervene as necessary  Determine patient's food preferences and provide high-protein, high-caloric foods as appropriate       INTERVENTIONS:  - Monitor oral intake, urinary output, labs, and treatment plans  - Assess nutrition and hydration status and recommend course of action  - Evaluate amount of meals eaten  - Assist patient with eating if necessary   - Allow adequate time for meals  - Recommend/ encourage appropriate diets, oral nutritional supplements, and vitamin/mineral supplements  - Order, calculate, and assess calorie counts as needed  - Recommend, monitor, and adjust tube feedings and TPN/PPN based on assessed needs  - Assess need for intravenous fluids  - Provide specific nutrition/hydration education as appropriate  - Include patient/family/caregiver in decisions related to nutrition  Outcome: Progressing     Problem: Prexisting or High Potential for Compromised Skin Integrity  Goal: Skin integrity is maintained or improved  Description: INTERVENTIONS:  - Identify patients at risk for skin breakdown  - Assess and monitor skin integrity  - Assess and monitor nutrition and hydration status  - Monitor labs   - Assess for incontinence   - Turn and reposition patient  - Assist with mobility/ambulation  - Relieve pressure over bony prominences  - Avoid friction and shearing  - Provide appropriate hygiene as needed including keeping skin clean and dry  - Evaluate need for skin moisturizer/barrier cream  - Collaborate with interdisciplinary team   - Patient/family teaching  - Consider wound care consult   Outcome: Progressing     Problem: Potential for Falls  Goal: Patient will remain free of falls  Description: INTERVENTIONS:  - Educate patient/family on patient safety including physical limitations  - Instruct patient to call for assistance with activity   - Consult OT/PT to assist with strengthening/mobility   - Keep Call bell within reach  - Keep bed low and locked with side rails adjusted as appropriate  - Keep care items and personal belongings within reach  - Initiate and maintain comfort rounds  - Make Fall Risk Sign visible to staff  - Apply yellow socks and bracelet for high fall risk patients  - Consider moving patient to room near nurses station  Outcome: Progressing

## 2022-08-29 NOTE — PROCEDURES
Video Swallow Study      Patient Name: Rose Mckeon  WRVIP'I Date: 8/29/2022        Past Medical History  No past medical history on file  Past Surgical History  No past surgical history on file  Video Barium Swallow Study    Summary:  Images are on PACS for review  Pt presents w/ mod oropharyngeal dysphagia maryjane by reduced manipulation of solids, reduced transfers  Noted mild swallow delay, reduced airway protection during the swallow w/ thin by cup/straw resulting in aspiration  Aspiration events were silent  Attempted head down, mult swallows  Pt w/ noted delayed 2* swallows to clear some mild/mod pooled material in the pharynx  Per gross esophageal screen:  [facility]    Recommendations:  Diet: puree  Liquids: nectar thick by cup  Allow thin water & ice in between meals following oral care  Meds: crush in puree  Strategies: small sips, mult swallows  Frequent oral care  Upright position  F/u ST tx: yes  Therapy Prognosis: fair   Prognosis considerations: family support, pt participation   Full Supervision  Aspiration Precautions    Consider consult with:   Results reviewed with: pt, nursing, family, physician, dietician  Aspiration precautions posted  Repeat VBS as necessary  If a dedicated assessment of the esophagus is desired, consider esophagram/barium swallow or EGD  Goals:  Pt will tolerate least restrictive diet w/out s/s aspiration or oral/pharyngeal difficulties  H&P/pertinent provider notes: (PMH noted above)  Refer to bedside     Special Studies:  CXR-No acute cardiopulmonary disease  Previous VBS:  -  Does the pt have pain? None stated  If yes, was nursing made aware/was it addressed?       Precautions:  -  Food Allergies:  -  Current Diet:   NPO   Premorbid diet:  Regular w/ thin   Dentition:  2 teeth, edentulous  O2 requirement:  RA  Oral mech:  Strength and ROM: reduced L sided movement    Vocal Quality/Speech:  Reduced, limited verbalizations  Cognitive status:  Awake, limited command following  Consistencies administered: Puree, soft solid,honey thick liquid, nectar thick liquid, thin liquid, barium tablet in puree, with thin  Liquids were administered/taken by straw/cup/tsp     Pt was viewed seated laterally at 90 degrees  Oral stage:    Lip closure: reduced but is able to draw from the straw  Noted to not fully open for tsps when cued  Mastication: poor- attempted to lateralize the material but unable to fully breakdown  Bolus formation: mildly reduced   Bolus control: mildly reduced with the liquids  Transfer:  Fair   Residue: mild at times w/ need for a 2* swallow    Pharyngeal stage:    Swallow promptness: mild swallow delay   Spill to valleculae: with all   Spill to pyriforms: with thin, nt,   Epiglottic inversion: present   Laryngeal excursion: mildly reduced anterior movement  Pharyngeal constriction: fair  Reduced airway closure during the swallow w/ thin/nt in larger amounts  Vallecular retention: -  Pyriform retention:-  PPW coating:-  Osteophytes: -  CP prominence:-  Retropulsion from prominence:-  Transient penetration: with the thin   Epiglottic undercoat:-  Penetration: with nt by straw (PAS8)  Aspiration:during the swallow w/ sequential cup sip thin, straw sip thin & straw sip nt (PAS8)  Noted less aspiration w/ cup sip nectar  Pt aspirated a significant amount of the thin w/o response  Strategies: pt tends to throw his head back, cued to remain forward  Reduced the cup sips to small single sips & no aspiration occurred  Response to aspiration: silent    Cued to cough but cough is weak    Screening of Esophageal stage:  Screened- no gross stasis    8-Point Penetration-Aspiration Scale   1 Material does not enter the airway   2 Material enters the airway, remains above the vocal folds, and is ejected  from the  airway    3 Material enters the airway, remains above the vocal folds, and is not ejected from the airway 4 Material enters the airway, contacts the vocal folds, and is ejected from the airway   5 Material enters the airway, contacts the vocal folds, and is not ejected from the airway    6 Material enters the airway, passes below the vocal folds and is ejected into the larynx or out of the airway    7 Material enters the airway, passes below the vocal folds, and is not ejected from the trachea despite effort    8 Material enters the airway, passes below the vocal folds, and no effort is made to eject

## 2022-08-29 NOTE — PHYSICAL THERAPY NOTE
PT orders received  chart reviewed  Pt off the floor will hold   Will continue to follow  Baltazar Darling, PT, DPT     08/29/22 1019   PT Last Visit   PT Visit Date 08/29/22   Note Type   Note Type Treatment   Cancel Reasons Patient off floor/test
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
## 2022-08-29 NOTE — DISCHARGE INSTRUCTIONS
Patient is comfort care and DNR/DNI  Please follow management per hospice for comfort care  Neurosurgery discharge instructions following head bleed:     Do not take any blood thinning medications (ie  No Advil  No motrin  No ibuprofen  No Aleve  No Aspirin  No fishoil  No heparin  No antiplatelet / no anticoagulation medication)  Refrain from activity that increases chance of trauma to head or falls  Recommend you take fall precaution  No strenuous activity or sports  Return to hospital Emergency Room if you experience worsening / new headache, nausea/vomiting, speech/vision change, seizure, confusion / mental status change, weakness, or other neurological changes  Follow-up as scheduled with a repeat CT head without contrast to be completed 2-3 days prior to visit  Prescription has been entered electronically  Please call  to schedule
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
## 2022-08-30 ENCOUNTER — ANESTHESIA EVENT (INPATIENT)
Dept: NON INVASIVE DIAGNOSTICS | Facility: HOSPITAL | Age: 62
DRG: 981 | End: 2022-08-30
Payer: COMMERCIAL

## 2022-08-30 PROBLEM — M48.02 CERVICAL SPINAL STENOSIS: Status: ACTIVE | Noted: 2022-01-01

## 2022-08-30 RX ORDER — LIDOCAINE HYDROCHLORIDE 10 MG/ML
INJECTION, SOLUTION EPIDURAL; INFILTRATION; INTRACAUDAL; PERINEURAL AS NEEDED
Status: DISCONTINUED | OUTPATIENT
Start: 2022-08-30 | End: 2022-08-30

## 2022-08-30 RX ORDER — SODIUM CHLORIDE 9 MG/ML
INJECTION, SOLUTION INTRAVENOUS CONTINUOUS PRN
Status: DISCONTINUED | OUTPATIENT
Start: 2022-08-30 | End: 2022-08-30

## 2022-08-30 RX ORDER — PROPOFOL 10 MG/ML
INJECTION, EMULSION INTRAVENOUS AS NEEDED
Status: DISCONTINUED | OUTPATIENT
Start: 2022-08-30 | End: 2022-08-30

## 2022-08-30 RX ORDER — PROPOFOL 10 MG/ML
INJECTION, EMULSION INTRAVENOUS CONTINUOUS PRN
Status: DISCONTINUED | OUTPATIENT
Start: 2022-08-30 | End: 2022-08-30

## 2022-08-30 RX ADMIN — SODIUM CHLORIDE: 9 INJECTION, SOLUTION INTRAVENOUS at 10:15

## 2022-08-30 RX ADMIN — PROPOFOL 20 MG: 10 INJECTION, EMULSION INTRAVENOUS at 10:24

## 2022-08-30 RX ADMIN — PROPOFOL 50 MCG/KG/MIN: 10 INJECTION, EMULSION INTRAVENOUS at 10:26

## 2022-08-30 RX ADMIN — LIDOCAINE HYDROCHLORIDE 50 MG: 10 INJECTION, SOLUTION EPIDURAL; INFILTRATION; INTRACAUDAL; PERINEURAL at 10:24

## 2022-08-30 RX ADMIN — PROPOFOL 50 MG: 10 INJECTION, EMULSION INTRAVENOUS at 10:26

## 2022-08-30 NOTE — ANESTHESIA POSTPROCEDURE EVALUATION
Post-Op Assessment Note    CV Status:  Stable  Pain Score: 0    Pain management: adequate     Mental Status:  Lethargic   Hydration Status:  Euvolemic   PONV Controlled:  Controlled   Airway Patency:  Patent      Post Op Vitals Reviewed: Yes      Staff: Anesthesiologist, CRNA         No complications documented      BP   98/54   Temp      Pulse  90   Resp   22   SpO2   96 RA
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
## 2022-08-30 NOTE — ANESTHESIA PREPROCEDURE EVALUATION
Procedure:  FRENCH    Relevant Problems   ANESTHESIA (within normal limits)      NEURO/PSYCH  Pt being evaluated for CVA          Physical Exam    Airway       Dental   No notable dental hx     Cardiovascular      Pulmonary      Other Findings        Anesthesia Plan  ASA Score- 3     Anesthesia Type- IV sedation with anesthesia with ASA Monitors  Additional Monitors:   Airway Plan:           Plan Factors-Exercise tolerance (METS): >4 METS  Chart reviewed  Induction- intravenous  Postoperative Plan-     Informed Consent- Anesthetic plan and risks discussed with spouse  I personally reviewed this patient with the CRNA  Discussed and agreed on the Anesthesia Plan with the CRNA  Karrie Nissen
81

## 2022-08-30 NOTE — PLAN OF CARE
Problem: MOBILITY - ADULT  Goal: Maintain or return to baseline ADL function  Description: INTERVENTIONS:  -  Assess patient's ability to carry out ADLs; assess patient's baseline for ADL function and identify physical deficits which impact ability to perform ADLs (bathing, care of mouth/teeth, toileting, grooming, dressing, etc )  - Assess/evaluate cause of self-care deficits   - Assess range of motion  - Assess patient's mobility; develop plan if impaired  - Assess patient's need for assistive devices and provide as appropriate  - Encourage maximum independence but intervene and supervise when necessary  - Involve family in performance of ADLs  - Assess for home care needs following discharge   - Consider OT consult to assist with ADL evaluation and planning for discharge  - Provide patient education as appropriate  Outcome: Progressing  Goal: Maintains/Returns to pre admission functional level  Description: INTERVENTIONS:  - Perform BMAT or MOVE assessment daily    - Set and communicate daily mobility goal to care team and patient/family/caregiver  - Collaborate with rehabilitation services on mobility goals if consulted  - Perform Range of Motion multiple times a day  - Reposition patient every 2 hours    - Dangle patient at least once times a day  - Stand patient at least two times a day  - Out of bed for toileting  - Record patient progress and toleration of activity level   Outcome: Progressing     Problem: PAIN - ADULT  Goal: Verbalizes/displays adequate comfort level or baseline comfort level  Description: Interventions:  - Encourage patient to monitor pain and request assistance  - Assess pain using appropriate pain scale  - Administer analgesics based on type and severity of pain and evaluate response  - Implement non-pharmacological measures as appropriate and evaluate response  - Consider cultural and social influences on pain and pain management  - Notify physician/advanced practitioner if interventions unsuccessful or patient reports new pain  Outcome: Progressing     Problem: INFECTION - ADULT  Goal: Absence or prevention of progression during hospitalization  Description: INTERVENTIONS:  - Assess and monitor for signs and symptoms of infection  - Monitor lab/diagnostic results  - Monitor all insertion sites, i e  indwelling lines, tubes, and drains  - Monitor endotracheal if appropriate and nasal secretions for changes in amount and color  - [place] appropriate cooling/warming therapies per order  - Administer medications as ordered  - Instruct and encourage patient and family to use good hand hygiene technique  - Identify and instruct in appropriate isolation precautions for identified infection/condition  Outcome: Progressing  Goal: Absence of fever/infection during neutropenic period  Description: INTERVENTIONS:  - Monitor WBC    Outcome: Progressing     Problem: SAFETY ADULT  Goal: Maintain or return to baseline ADL function  Description: INTERVENTIONS:  -  Assess patient's ability to carry out ADLs; assess patient's baseline for ADL function and identify physical deficits which impact ability to perform ADLs (bathing, care of mouth/teeth, toileting, grooming, dressing, etc )  - Assess/evaluate cause of self-care deficits   - Assess range of motion  - Assess patient's mobility; develop plan if impaired  - Assess patient's need for assistive devices and provide as appropriate  - Encourage maximum independence but intervene and supervise when necessary  - Involve family in performance of ADLs  - Assess for home care needs following discharge   - Consider OT consult to assist with ADL evaluation and planning for discharge  - Provide patient education as appropriate  Outcome: Progressing  Goal: Maintains/Returns to pre admission functional level  Description: INTERVENTIONS:  - Perform BMAT or MOVE assessment daily    - Set and communicate daily mobility goal to care team and patient/family/caregiver  - Collaborate with rehabilitation services on mobility goals if consulted  - Record patient progress and toleration of activity level   Outcome: Progressing  Goal: Patient will remain free of falls  Description: INTERVENTIONS:  - Educate patient/family on patient safety including physical limitations  - Instruct patient to call for assistance with activity   - Consult OT/PT to assist with strengthening/mobility   - Keep Call bell within reach  - Keep bed low and locked with side rails adjusted as appropriate  - Keep care items and personal belongings within reach  - Initiate and maintain comfort rounds  - Make Fall Risk Sign visible to staff  - Offer Toileting every 2 Hours, in advance of need  - Initiate/Maintain bed/chair alarm  - Obtain necessary fall risk management equipment  - Apply yellow socks and bracelet for high fall risk patients  - Consider moving patient to room near nurses station  Outcome: Progressing     Problem: DISCHARGE PLANNING  Goal: Discharge to home or other facility with appropriate resources  Description: INTERVENTIONS:  - Identify barriers to discharge w/patient and caregiver  - Arrange for needed discharge resources and transportation as appropriate  - Identify discharge learning needs (meds, wound care, etc )  - Arrange for interpretive services to assist at discharge as needed  - Refer to Case Management Department for coordinating discharge planning if the patient needs post-hospital services based on physician/advanced practitioner order or complex needs related to functional status, cognitive ability, or social support system  Outcome: Progressing     Problem: Knowledge Deficit  Goal: Patient/family/caregiver demonstrates understanding of disease process, treatment plan, medications, and discharge instructions  Description: Complete learning assessment and assess knowledge base    Interventions:  - Provide teaching at level of understanding  - Provide teaching via preferred learning methods  Outcome: Progressing     Problem: Nutrition/Hydration-ADULT  Goal: Nutrient/Hydration intake appropriate for improving, restoring or maintaining nutritional needs  Description: Monitor and assess patient's nutrition/hydration status for malnutrition  Collaborate with interdisciplinary team and initiate plan and interventions as ordered  Monitor patient's weight and dietary intake as ordered or per policy  Utilize nutrition screening tool and intervene as necessary  Determine patient's food preferences and provide high-protein, high-caloric foods as appropriate       INTERVENTIONS:  - Monitor oral intake, urinary output, labs, and treatment plans  - Assess nutrition and hydration status and recommend course of action  - Evaluate amount of meals eaten  - Assist patient with eating if necessary   - Allow adequate time for meals  - Recommend/ encourage appropriate diets, oral nutritional supplements, and vitamin/mineral supplements  - Order, calculate, and assess calorie counts as needed  - Recommend, monitor, and adjust tube feedings and TPN/PPN based on assessed needs  - Assess need for intravenous fluids  - Provide specific nutrition/hydration education as appropriate  - Include patient/family/caregiver in decisions related to nutrition  Outcome: Progressing     Problem: Prexisting or High Potential for Compromised Skin Integrity  Goal: Skin integrity is maintained or improved  Description: INTERVENTIONS:  - Identify patients at risk for skin breakdown  - Assess and monitor skin integrity  - Assess and monitor nutrition and hydration status  - Monitor labs   - Assess for incontinence   - Turn and reposition patient  - Assist with mobility/ambulation  - Relieve pressure over bony prominences  - Avoid friction and shearing  - Provide appropriate hygiene as needed including keeping skin clean and dry  - Evaluate need for skin moisturizer/barrier cream  - Collaborate with interdisciplinary team   - Patient/family teaching  - Consider wound care consult   Outcome: Progressing     Problem: Potential for Falls  Goal: Patient will remain free of falls  Description: INTERVENTIONS:  - Educate patient/family on patient safety including physical limitations  - Instruct patient to call for assistance with activity   - Consult OT/PT to assist with strengthening/mobility   - Keep Call bell within reach  - Keep bed low and locked with side rails adjusted as appropriate  - Keep care items and personal belongings within reach  - Initiate and maintain comfort rounds  - Apply yellow socks and bracelet for high fall risk patients  - Consider moving patient to room near nurses station  Outcome: Progressing

## 2022-08-30 NOTE — ANESTHESIA POSTPROCEDURE EVALUATION
Post-Op Assessment Note    CV Status:  Stable  Pain Score: 0    Pain management: adequate     Mental Status:  Awake   Hydration Status:  Euvolemic   PONV Controlled:  Controlled   Airway Patency:  Patent      Post Op Vitals Reviewed: Yes      Staff: CRNA   Comments: no change in patient         No complications documented      BP   105/60   Temp   97 5   Pulse  82   Resp   16   SpO2   97%

## 2022-08-30 NOTE — ASSESSMENT & PLAN NOTE
- MRI cervical spine wo contrast 8/29: Multilevel degenerative disc disease and diffuse disc osteophyte complexes with superimposed disc protrusions at the C3-4 through the C6-7 levels causing cord impingement at the C3-4 through the C4-5 levels    There is no cord signal abnormality to suggest myelomalacia or edema   - Patient can follow up outpatient with Neurosurgery

## 2022-08-30 NOTE — OCCUPATIONAL THERAPY NOTE
OT CANCEL NOTE    Pt chart reviewed  Pt went for repeat CT scan this AM; and going off the floor for FRENCH  Will hold OT tx  Will continue to follow pt on caseload and see pt when medically stable and as clinically appropriate         08/30/22 3363   OT Last Visit   OT Visit Date 08/30/22   Note Type   Note Type Cancelled Session   Cancel Reasons Medical status     Joe Triana MS, OTR/L
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
## 2022-08-30 NOTE — PHYSICAL THERAPY NOTE
PT orders received  Chart reviewed  Pt scheduled for FRENCH per RN   will hold will continue to follow  Enzo Cadena, PT, DPT     08/30/22 1031   PT Last Visit   PT Visit Date 08/30/22   Note Type   Note Type Cancelled Session   Cancel Reasons Patient off floor/test

## 2022-08-30 NOTE — PLAN OF CARE
Problem: Nutrition/Hydration-ADULT  Goal: Nutrient/Hydration intake appropriate for improving, restoring or maintaining nutritional needs  Description: Monitor and assess patient's nutrition/hydration status for malnutrition  Collaborate with interdisciplinary team and initiate plan and interventions as ordered  Monitor patient's weight and dietary intake as ordered or per policy  Utilize nutrition screening tool and intervene as necessary  Determine patient's food preferences and provide high-protein, high-caloric foods as appropriate  INTERVENTIONS:  - Monitor oral intake, urinary output, labs, and treatment plans  - Assess nutrition and hydration status and recommend course of action  - Evaluate amount of meals eaten  - Assist patient with eating if necessary   - Allow adequate time for meals  - Recommend/ encourage appropriate diets, oral nutritional supplements, and vitamin/mineral supplements  - Order, calculate, and assess calorie counts as needed  - Recommend, monitor, and adjust tube feedings and TPN/PPN based on assessed needs  - Assess need for intravenous fluids  - Provide specific nutrition/hydration education as appropriate  - Include patient/family/caregiver in decisions related to nutrition  Outcome: Progressing     Problem: Knowledge Deficit  Goal: Patient/family/caregiver demonstrates understanding of disease process, treatment plan, medications, and discharge instructions  Description: Complete learning assessment and assess knowledge base  Interventions:  - Provide teaching at level of understanding  - Provide teaching via preferred learning methods  Outcome: Progressing     Problem: Neurological Deficit  Goal: Neurological status is stable or improving  Description: Interventions:  - Monitor and assess patient's level of consciousness, motor function, sensory function, and level of assistance needed for ADLs  - Monitor and report changes from baseline   Collaborate with interdisciplinary team to initiate plan and implement interventions as ordered  - Provide and maintain a safe environment  - Consider seizure precautions  - Consider fall precautions  - Consider aspiration precautions  - Consider bleeding precautions  Outcome: Progressing     Problem: Activity Intolerance/Impaired Mobility  Goal: Mobility/activity is maintained at optimum level for patient  Description: Interventions:  - Assess and monitor patient  barriers to mobility and need for assistive/adaptive devices  - Assess patient's emotional response to limitations  - Collaborate with interdisciplinary team and initiate plans and interventions as ordered  - Encourage independent activity per ability   - Maintain proper body alignment  - Perform active/passive rom as tolerated/ordered  - Plan activities to conserve energy   - Turn patient as appropriate  Outcome: Progressing     Problem: Potential for Aspiration  Goal: Ventilated patient's risk of aspiration is minimized  Description: Assess and monitor vital signs, respiratory status, airway cuff pressure, and labs (WBC)  Monitor for signs of aspiration (tachypnea, cough, rales, wheezing, cyanosis, fever)  - Elevate head of bed 30 degrees if patient has tube feeding   - Monitor tube feeding    Outcome: Progressing

## 2022-08-30 NOTE — PROGRESS NOTES
Daily Progress Note - Critical Care   Elvin Brown 57 y o  male MRN: [id number]  Unit/Bed#: ICU 01 Encounter: [id number]        ----------------------------------------------------------------------------------------  HPI/24hr events: No overnight events  Blood pressures at goal  MRI C spine done      ---------------------------------------------------------------------------------------  SUBJECTIVE  Obtunded this morning on exam  Improved throughout the day  Patient with headaches as per wife as bedside  Wife updated on patients condition  Review of Systems   Unable to perform ROS: Acuity of condition   Neurological: Positive for headaches  Review of systems was reviewed and negative unless stated above in HPI/24-hour events   ---------------------------------------------------------------------------------------  Assessment and Plan:    Vitals  /66 HR 90, RR 16 and SpO2 96% room air MAP88    Neuro:  · Diagnosis: Right thalamic intracranial hemorrhage with IV extension, acute encephalopathy  Increasing tone in LUE and LLE   ? Plan:   § Hemorrhage possibly secondary to ischemic stroke   § Workup for central embolic source  § Follow up [facility]  § Thrombosis panel pending  § Hold AC/AP  § CTH in 2 weeks, if stable okay to start ASA  § Neuro checks Q4 H  § Appreciate neurology recommendations   § Appreciate PT/PMR recommendations  · Diagnosis: Headache   ? Plan:   § Tylenol 650 mg q 6 H   · Diagnosis: Moderate cervical spinal stenosis at C3-C4  ? Plan:   § MRI C spine without any signal abnormalities   § No acute intervention at this time         CV:   · Diagnosis: Hypertension  ? Plan:   § SBP goal 110-140  § Amlodipine 10 mg   § lisinopril 20 mg   § increase coreg to 6 25 mg bid   § DC Doxazocin  § Cardene on hold after receiving propofol for [facility]  § IV Hydralazine 10 mg Q 4 PRN for SBP>140  § IV Labetalol 10 mg Q 4H PRN for SBP >140, HR >50  · Diagnosis: Hyperlipidemia  ?  Plan:   § Lipitor 40 mg    Pulm:  ? Plan: Pulmonary hygiene         GI:   · Diagnosis: Dysphagia   ? Plan:   §  Nectar thick liquids  § Appreciate speech recommendations   § Bowel regimen      :   · Diagnosis: Acute urinary retention   ? Plan:   § DC doxazocin  § DC joy catheter  § Voiding trial   § Hold renal US      F/E/N:   · Plan:   ? Replete electrolytes   ? Pureed diet         Heme/Onc:   · Diagnosis: DVT prophylaxis   ? Plan:   § Continue to hold given hemorrhage         Endo:   · Diagnosis: New onset Diabetes  ? Plan:   § Last fingerstick 113   § SSI  § DC q 6 insulin        ID:   · Diagnosis: Fever  ? Plan:   § 8/28 Tmax 101 4  § Procalcitonin 0 12  § No growth at 24 H   § Last dose Rocephin today      MSK/Skin:   ? Plan:   § Frequent reposition   § PT/OT/PMR       Disposition: Continue Critical Care   Code Status: Level 1 - Full Code  ---------------------------------------------------------------------------------------  ICU CORE MEASURES    Prophylaxis   VTE Pharmacologic Prophylaxis: Pharmacologic VTE Prophylaxis contraindicated due to [address]  VTE Mechanical Prophylaxis: sequential compression device  Stress Ulcer Prophylaxis: Prophylaxis Not Indicated     ABCDE Protocol (if indicated)  Plan to perform spontaneous awakening trial today? Not applicable  Plan to perform spontaneous breathing trial today? Not applicable  Obvious barriers to extubation? Not applicable  CAM-ICU: Unable to assess    Invasive Devices Review  Invasive Devices  Report    Peripheral Intravenous Line  Duration           Peripheral IV 08/29/22 Left Antecubital <1 day    Peripheral IV 08/29/22 Right Forearm <1 day              Can any invasive devices be discontinued today?  Not applicable  ---------------------------------------------------------------------------------------  OBJECTIVE    Physical Exam    Vitals   Vitals:    08/30/22 1100 08/30/22 1130 08/30/22 1200 08/30/22 1300   BP: 102/63  138/79 139/74   BP Location:       Pulse: 84  82 96   Resp: 15 22 14 19   Temp:       TempSrc:       SpO2: 95%  94% 95%   Weight:       Height:         Temp (24hrs), Av 7 °F (37 1 °C), Min:97 8 °F (36 6 °C), Max:99 1 °F (37 3 °C)  Current: Temperature: 98 7 °F (37 1 °C)  HR:  98  BP:  117/77  RR:  15  SpO2:  95    Respiratory:  SpO2: SpO2: 95 %       Invasive/non-invasive ventilation settings   Respiratory  Report   Lab Data (Last 4 hours)    None         O2/Vent Data (Last 4 hours)    None                Height and Weights   Height: 5' 4" (162 6 cm)  IBW (Ideal Body Weight): 59 2 kg  Body mass index is 31 07 kg/m²  Weight (last 2 days)     Date/Time Weight    22 1040 82 1 (181)          Intake and Output  I/O        0701   0700  07 0700    P  O   40    I V  (mL/kg) 1011 7 (12 3) 243 3 (3)    NG/     IV Piggyback 50 50    Feedings 1300 100    Total Intake(mL/kg) 2511 7 (30 6) 433 3 (5 3)    Urine (mL/kg/hr) 1765 (0 9) 1135 (0 6)    Stool 0 0    Total Output 1765 1135    Net +746 7 -701 7          Unmeasured Stool Occurrence 1 x 3 x        UOP: 1235 ml over the past 24 H      Nutrition       Diet Orders   (From admission, onward)             Start     Ordered    22 1105  Diet Dysphagia/Modified Consistency; Dysphagia 1-Pureed; Nectar Thick Liquid; Consistent Carbohydrate Diet Level 1 (4 carb servings/60 grams CHO/meal)  Diet effective now        References:    Nutrtion Support Algorithm Enteral vs  Parenteral   Question Answer Comment   Diet Type Dysphagia/Modified Consistency    Dysphagia/Modified Consistency Dysphagia 1-Pureed    Liquid Modifier Nectar Thick Liquid    Other Restriction(s): Consistent Carbohydrate Diet Level 1 (4 carb servings/60 grams CHO/meal)    RD to adjust diet per protocol?  Yes        22 1104                Laboratory and Diagnostics:  Results from last 7 days   Lab Units 22  0514 22  0531 22  0807 22  0512 22  1043 08/26/22  1042   WBC Thousand/uL 8 34 7 67 12 93* 12 83* --  6 77   HEMOGLOBIN g/dL 14 4 13 5 15 0 15 0  --  17 5*   I STAT HEMOGLOBIN g/dl  --   --   --   --  17 0  --    HEMATOCRIT % 42 7 40 3 44 5 43 7  --  49 7*   HEMATOCRIT, ISTAT %  --   --   --   --  50*  --    PLATELETS Thousands/uL 217 200 268 274  --  288   NEUTROS PCT %  --   --   --  92*  --   --    MONOS PCT %  --   --   --  3*  --   --      Results from last 7 days   Lab Units 08/30/22  0514 08/29/22  0531 08/28/22  0807 08/27/22  1728 08/27/22  0512 08/26/22  1043 08/26/22  1042   SODIUM mmol/L 142 142 140 143 142  --  137   POTASSIUM mmol/L 3 8 3 6 5 1 4 3 4 0  --  3 7   CHLORIDE mmol/L 109* 109* 111* 114* 112*  --  101   CO2 mmol/L 28 25 24 25 26  --  25   CO2, I-STAT mmol/L  --   --   --   --   --  29  --    ANION GAP mmol/L 5 8 5 4 4  --  11   BUN mg/dL 24 24 20 28* 31*  --  16   CREATININE mg/dL 0 90 1 09 1 01 1 16 1 53*  --  0 92   CALCIUM mg/dL 7 8* 8 3 8 1* 8 1* 8 8  --  8 9   GLUCOSE RANDOM mg/dL 128 314* 212* 164* 235*  --  311*   ALT U/L  --   --   --   --  40  --   --    AST U/L  --   --   --   --  12  --   --    ALK PHOS U/L  --   --   --   --  75  --   --    ALBUMIN g/dL  --   --   --   --  3 3*  --   --    TOTAL BILIRUBIN mg/dL  --   --   --   --  2 00*  --   --      Results from last 7 days   Lab Units 08/29/22 0531 08/28/22  0807 08/27/22  0512   MAGNESIUM mg/dL 2 5 2 3 2 1   PHOSPHORUS mg/dL 4 6* 3 3 2 3      Results from last 7 days   Lab Units 08/26/22  1042   INR  1 04   PTT seconds 24              ABG:    VBG:    Results from last 7 days   Lab Units 08/30/22  0514 08/28/22  1737   PROCALCITONIN ng/ml 0 12 0 16       Micro  Results from last 7 days   Lab Units 08/28/22  1748 08/28/22  1737   BLOOD CULTURE  No Growth at 24 hrs  No Growth at 24 hrs  EKG: N/A  Imaging:  I have personally reviewed pertinent reports        Active Medications  Scheduled Meds:  Current Facility-Administered Medications   Medication Dose Route Frequency Provider Last Rate    acetaminophen  650 mg Oral Carmen Raines MD      amLODIPine  10 mg Oral Daily Mary Lazo MD      atorvastatin  40 mg Oral After [employer], DO      bisacodyl  10 mg Rectal Daily PRN Erinn Borrero MD      carvedilol  6 25 mg Oral BID With Meals [place], [place]      cefTRIAXone  1,000 mg Intravenous Q24H KIM Brush Stopped (08/29/22 2305)    chlorhexidine  15 mL Mouth/Throat Q12H [facility] Mary Lazo MD      hydrALAZINE  10 mg Intravenous Q4H PRN Erinn Borrero MD      insulin glargine  20 Units Subcutaneous QAM Dipak Gupta MD      insulin lispro  2-12 Units Subcutaneous Q6H [facility] [place], [place]      labetalol  20 mg Intravenous L8U PRN Rani Barnett MD      lisinopril  20 mg Oral Daily Jere Fang MD      niCARdipine  1-15 mg/hr Intravenous Titrated Jere Fang MD 2 5 mg/hr (08/30/22 1155)    ondansetron  4 mg Intravenous Q6H PRN Mary Lazo MD      oxyCODONE  5 mg Oral Q4H PRN Mary Lazo MD      oxyCODONE  7 5 mg Oral Q4H PRN Sharita Wilson MD      polyethylene glycol  17 g Oral Daily Erinn Borrero MD      senna-docusate sodium  2 tablet Oral BID Erinn Borrero MD       Continuous Infusions:  niCARdipine, 1-15 mg/hr, Last Rate: 2 5 mg/hr (08/30/22 1155)      PRN Meds:   bisacodyl, 10 mg, Daily PRN  hydrALAZINE, 10 mg, Q4H PRN  labetalol, 20 mg, Q4H PRN  ondansetron, 4 mg, Q6H PRN  oxyCODONE, 5 mg, Q4H PRN  oxyCODONE, 7 5 mg, Q4H PRN        Allergies   Not on File    Advance Directive and Living Will:      Power of :    POLST:      Counseling / Coordination of Care  Total Critical Care time spent 30 minutes excluding procedures, teaching and family updates  Shawn Aparicio MD      Portions of the record may have been created with voice recognition software    Occasional wrong word or "sound a like" substitutions may have occurred due to the inherent limitations of voice recognition software    Read the chart carefully and recognize, using context, where substitutions have occurred
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
## 2022-08-31 NOTE — PLAN OF CARE
Problem: MOBILITY - ADULT  Goal: Maintain or return to baseline ADL function  Description: INTERVENTIONS:  -  Assess patient's ability to carry out ADLs; assess patient's baseline for ADL function and identify physical deficits which impact ability to perform ADLs (bathing, care of mouth/teeth, toileting, grooming, dressing, etc )  - Assess/evaluate cause of self-care deficits   - Assess range of motion  - Assess patient's mobility; develop plan if impaired  - Assess patient's need for assistive devices and provide as appropriate  - Encourage maximum independence but intervene and supervise when necessary  - Involve family in performance of ADLs  - Assess for home care needs following discharge   - Consider OT consult to assist with ADL evaluation and planning for discharge  - Provide patient education as appropriate  Outcome: Progressing  Goal: Maintains/Returns to pre admission functional level  Description: INTERVENTIONS:  - Perform BMAT or MOVE assessment daily    - Set and communicate daily mobility goal to care team and patient/family/caregiver     - Collaborate with rehabilitation services on mobility goals if consulted  - Out of bed for toileting  - Record patient progress and toleration of activity level   Outcome: Progressing     Problem: PAIN - ADULT  Goal: Verbalizes/displays adequate comfort level or baseline comfort level  Description: Interventions:  - Encourage patient to monitor pain and request assistance  - Assess pain using appropriate pain scale  - Administer analgesics based on type and severity of pain and evaluate response  - Implement non-pharmacological measures as appropriate and evaluate response  - Consider cultural and social influences on pain and pain management  - Notify physician/advanced practitioner if interventions unsuccessful or patient reports new pain  Outcome: Progressing     Problem: INFECTION - ADULT  Goal: Absence or prevention of progression during hospitalization  Description: INTERVENTIONS:  - Assess and monitor for signs and symptoms of infection  - Monitor lab/diagnostic results  - Monitor all insertion sites, i e  indwelling lines, tubes, and drains  - Monitor endotracheal if appropriate and nasal secretions for changes in amount and color  - [place] appropriate cooling/warming therapies per order  - Administer medications as ordered  - Instruct and encourage patient and family to use good hand hygiene technique  - Identify and instruct in appropriate isolation precautions for identified infection/condition  Outcome: Progressing  Goal: Absence of fever/infection during neutropenic period  Description: INTERVENTIONS:  - Monitor WBC    Outcome: Progressing     Problem: SAFETY ADULT  Goal: Maintain or return to baseline ADL function  Description: INTERVENTIONS:  -  Assess patient's ability to carry out ADLs; assess patient's baseline for ADL function and identify physical deficits which impact ability to perform ADLs (bathing, care of mouth/teeth, toileting, grooming, dressing, etc )  - Assess/evaluate cause of self-care deficits   - Assess range of motion  - Assess patient's mobility; develop plan if impaired  - Assess patient's need for assistive devices and provide as appropriate  - Encourage maximum independence but intervene and supervise when necessary  - Involve family in performance of ADLs  - Assess for home care needs following discharge   - Consider OT consult to assist with ADL evaluation and planning for discharge  - Provide patient education as appropriate  Outcome: Progressing  Goal: Maintains/Returns to pre admission functional level  Description: INTERVENTIONS:  - Perform BMAT or MOVE assessment daily    - Set and communicate daily mobility goal to care team and patient/family/caregiver     - Collaborate with rehabilitation services on mobility goals if consulted  - Out of bed for toileting  - Record patient progress and toleration of activity level   Outcome: Progressing  Goal: Patient will remain free of falls  Description: INTERVENTIONS:  - Educate patient/family on patient safety including physical limitations  - Instruct patient to call for assistance with activity   - Consult OT/PT to assist with strengthening/mobility   - Keep Call bell within reach  - Keep bed low and locked with side rails adjusted as appropriate  - Keep care items and personal belongings within reach  - Initiate and maintain comfort rounds  - Make Fall Risk Sign visible to staff  - Apply yellow socks and bracelet for high fall risk patients  - Consider moving patient to room near nurses station  Outcome: Progressing     Problem: DISCHARGE PLANNING  Goal: Discharge to home or other facility with appropriate resources  Description: INTERVENTIONS:  - Identify barriers to discharge w/patient and caregiver  - Arrange for needed discharge resources and transportation as appropriate  - Identify discharge learning needs (meds, wound care, etc )  - Arrange for interpretive services to assist at discharge as needed  - Refer to Case Management Department for coordinating discharge planning if the patient needs post-hospital services based on physician/advanced practitioner order or complex needs related to functional status, cognitive ability, or social support system  Outcome: Progressing     Problem: Knowledge Deficit  Goal: Patient/family/caregiver demonstrates understanding of disease process, treatment plan, medications, and discharge instructions  Description: Complete learning assessment and assess knowledge base  Interventions:  - Provide teaching at level of understanding  - Provide teaching via preferred learning methods  Outcome: Progressing     Problem: Nutrition/Hydration-ADULT  Goal: Nutrient/Hydration intake appropriate for improving, restoring or maintaining nutritional needs  Description: Monitor and assess patient's nutrition/hydration status for malnutrition  Collaborate with interdisciplinary team and initiate plan and interventions as ordered  Monitor patient's weight and dietary intake as ordered or per policy  Utilize nutrition screening tool and intervene as necessary  Determine patient's food preferences and provide high-protein, high-caloric foods as appropriate       INTERVENTIONS:  - Monitor oral intake, urinary output, labs, and treatment plans  - Assess nutrition and hydration status and recommend course of action  - Evaluate amount of meals eaten  - Assist patient with eating if necessary   - Allow adequate time for meals  - Recommend/ encourage appropriate diets, oral nutritional supplements, and vitamin/mineral supplements  - Order, calculate, and assess calorie counts as needed  - Recommend, monitor, and adjust tube feedings and TPN/PPN based on assessed needs  - Assess need for intravenous fluids  - Provide specific nutrition/hydration education as appropriate  - Include patient/family/caregiver in decisions related to nutrition  Outcome: Progressing     Problem: Prexisting or High Potential for Compromised Skin Integrity  Goal: Skin integrity is maintained or improved  Description: INTERVENTIONS:  - Identify patients at risk for skin breakdown  - Assess and monitor skin integrity  - Assess and monitor nutrition and hydration status  - Monitor labs   - Assess for incontinence   - Turn and reposition patient  - Assist with mobility/ambulation  - Relieve pressure over bony prominences  - Avoid friction and shearing  - Provide appropriate hygiene as needed including keeping skin clean and dry  - Evaluate need for skin moisturizer/barrier cream  - Collaborate with interdisciplinary team   - Patient/family teaching  - Consider wound care consult   Outcome: Progressing     Problem: Potential for Falls  Goal: Patient will remain free of falls  Description: INTERVENTIONS:  - Educate patient/family on patient safety including physical limitations  - Instruct patient to call for assistance with activity   - Consult OT/PT to assist with strengthening/mobility   - Keep Call bell within reach  - Keep bed low and locked with side rails adjusted as appropriate  - Keep care items and personal belongings within reach  - Initiate and maintain comfort rounds  - Make Fall Risk Sign visible to staff  - Apply yellow socks and bracelet for high fall risk patients  - Consider moving patient to room near nurses station  Outcome: Progressing     Problem: Neurological Deficit  Goal: Neurological status is stable or improving  Description: Interventions:  - Monitor and assess patient's level of consciousness, motor function, sensory function, and level of assistance needed for ADLs  - Monitor and report changes from baseline  Collaborate with interdisciplinary team to initiate plan and implement interventions as ordered  - Provide and maintain a safe environment  - Consider seizure precautions  - Consider fall precautions  - Consider aspiration precautions  - Consider bleeding precautions  Outcome: Progressing     Problem: Activity Intolerance/Impaired Mobility  Goal: Mobility/activity is maintained at optimum level for patient  Description: Interventions:  - Assess and monitor patient  barriers to mobility and need for assistive/adaptive devices  - Assess patient's emotional response to limitations  - Collaborate with interdisciplinary team and initiate plans and interventions as ordered  - Encourage independent activity per ability   - Maintain proper body alignment  - Perform active/passive rom as tolerated/ordered  - Plan activities to conserve energy   - Turn patient as appropriate  Outcome: Progressing     Problem: Communication Impairment  Goal: Ability to express needs and understand communication  Description: Assess patient's communication skills and ability to understand information    Patient will demonstrate use of effective communication techniques, alternative methods of communication and understanding even if not able to speak  - Encourage communication and provide alternate methods of communication as needed  - Collaborate with case management/ for discharge needs  - Include patient/family/caregiver in decisions related to communication  Outcome: Progressing     Problem: Potential for Aspiration  Goal: Ventilated patient's risk of aspiration is minimized  Description: Assess and monitor vital signs, respiratory status, airway cuff pressure, and labs (WBC)  Monitor for signs of aspiration (tachypnea, cough, rales, wheezing, cyanosis, fever)  - Elevate head of bed 30 degrees if patient has tube feeding   - Monitor tube feeding  Outcome: Progressing     Problem: Nutrition  Goal: Nutrition/Hydration status is improving  Description: Monitor and assess patient's nutrition/hydration status for malnutrition (ex- brittle hair, bruises, dry skin, pale skin and conjunctiva, muscle wasting, smooth red tongue, and disorientation)  Collaborate with interdisciplinary team and initiate plan and interventions as ordered  Monitor patient's weight and dietary intake as ordered or per policy  Utilize nutrition screening tool and intervene per policy  Determine patient's food preferences and provide high-protein, high-caloric foods as appropriate  - Assist patient with eating   - Allow adequate time for meals   - Encourage patient to take dietary supplement as ordered  - Collaborate with clinical nutritionist   - Include patient/family/caregiver in decisions related to nutrition    Outcome: Progressing

## 2022-08-31 NOTE — PLAN OF CARE
Problem: OCCUPATIONAL THERAPY ADULT  Goal: Performs self-care activities at highest level of function for planned discharge setting  See evaluation for individualized goals  Description: Treatment Interventions: ADL retraining, Visual perceptual retraining, Functional transfer training, UE strengthening/ROM, Endurance training, Cognitive reorientation, Patient/family training, Equipment evaluation/education, Fine motor coordination activities, Neuromuscular reeducation, Compensatory technique education, Continued evaluation, Energy conservation, Activityengagement          See flowsheet documentation for full assessment, interventions and recommendations  Outcome: Progressing  Note: Limitation: Decreased ADL status, Decreased UE strength, Decreased UE ROM, Decreased Safe judgement during ADL, Decreased cognition, Decreased endurance, Decreased sensation, Visual deficit, Decreased fine motor control, Decreased self-care trans, Decreased high-level ADLs, Non-func L UE  Prognosis: Fair  Assessment: Patient participated in Skilled OT session 8/31/2022 with interventions consisting of Energy Conservation techniques, deep breathing technique, safety awareness and fall prevention techniques, therapeutic exercise to: increase functional use of BUEs, increase BUE muscle strength ,  therapeutic activities to: increase activity tolerance, increase standing tolerance time with unilateral UE support to complete sink level ADLs, neuromuscular re education to: facilitate volitional use of limbs, facilitate proximal cocontraction of limbs and core, elicit righting and equilibrium reactions for improved postural control and alignment during transitional movements, increase dynamic sit/ stand balance during functional activity , increase postural control, increase trunk control and increase OOB/ sitting tolerance   Patient agreeable to OT treatment session, upon arrival patient was found seated OOB to Recliner    In comparison to previous session, patient with improvements in activity tolerance, initiating,sequencing tasks, attention to L side and static sitting balance   Patient requiring frequent re direction, verbal cues for safety, verbal cues for correct technique, verbal cues for pacing thru activity steps and frequent rest periods  Patient continues to be functioning below baseline level, occupational performance remains limited secondary to factors listed above and increased risk for falls and injury  From OT standpoint, recommendation at time of d/c would be Short Term Rehab when medically stable  Patient to benefit from continued Occupational Therapy treatment while in the hospital to address deficits as defined above and maximize level of functional independence with ADLs and functional mobility    Recommendation: (S) Physiatry Consult  OT Discharge Recommendation: Post acute rehabilitation services        Sherrill Curtis MS, OTR/L

## 2022-08-31 NOTE — PROGRESS NOTES
Daily Progress Note - Critical Care   Aris Orellana 57 y o  male MRN: [id number]  Unit/Bed#: ICU 01 Encounter: [id number]        ----------------------------------------------------------------------------------------  HPI/24hr events: Overnight , prn labetalol 20 mg  Had gotten additional dose of lisinopril 20 mg @ 1721  Cardene at 3 5     ---------------------------------------------------------------------------------------  SUBJECTIVE  Reports headache today is a 4/10 and that tylenol is not helping  Patient somnolent while working with speech at bedside    Review of Systems  Review of systems was reviewed and negative unless stated above in HPI/24-hour events   ---------------------------------------------------------------------------------------  Assessment and Plan:    /75, mA P 93, HR 78, RR 13 and SpO2 94% on room air    Neuro:  · Diagnosis: Right thalamic intracranial hemorrhage with IV extension, acute encephalopathy  Increasing tone in LUE and LLE   ? FRENCH LVEF 19%, normal systolic function, no PFO, no thrombus  ? Likely due to hypertension, no embolic source identified  ? Plan:   § Hemorrhage possibly secondary to ischemic stroke   § Thrombosis panel pending  § Hold AC/AP  § CTH in 2 weeks, if stable okay to start ASA  § Neuro checks Q4 H  § Appreciate neurology recommendations - recommending outpatient loop recorder   § Appreciate PT/PMR recommendations  § SBP < 140  · Diagnosis: Headache   ? Plan:   § Tylenol 650 mg q 6 H   § fioricet prn   · Diagnosis: Moderate cervical spinal stenosis at C3-C4  ? Plan:   § MRI C spine without any signal abnormalities   § No acute intervention at this time         CV:   · Diagnosis: Hypertension  ?  Plan:   § SBP goal 110-140  § Amlodipine 10 mg   § lisinopril 40 mg @ 1800  § Increase Coreg to 12 5 mg bid   § Start chlorthalidone 25 mg   § IV Hydralazine 10 mg Q 4 PRN for SBP>140  § IV Labetalol 20 mg Q 4H PRN for SBP >140, HR >50  · Diagnosis: Hyperlipidemia  ? Plan:   § Lipitor 40 mg      Pulm:  ? Plan: Pulmonary hygiene         GI:   · Diagnosis: Dysphagia   ? Plan:   §  Nectar thick liquids  § Appreciate speech recommendations-   § Will need repeat VBS in the future  § Bowel regimen      :   · Diagnosis: Acute urinary retention   ? Plan:  § joy catheter DC'ed  § Voiding urine- 637 ml over the last 24 H     F/E/N:   · Plan:   ? Replete electrolytes   ? Pureed diet         Heme/Onc:   · Diagnosis: DVT prophylaxis   ? Plan:   § Continue to hold given hemorrhage         Endo:   · Diagnosis: New onset Diabetes  ? Plan:   § Last fingerstick 152   § SSI  § Finger sticks before meals and at bed time         ID:   · Diagnosis: Fever  ? Plan:   § 8/28 Tmax 101 4, no further temps   § Blood cultures negative        MSK/Skin:   ? Plan:   § Frequent reposition   § PT/OT/PMR      Disposition: Continue Critical Care   Code Status: Level 1 - Full Code  ---------------------------------------------------------------------------------------  ICU CORE MEASURES    Prophylaxis   VTE Pharmacologic Prophylaxis: Pharmacologic VTE Prophylaxis contraindicated due to hemorrhage   VTE Mechanical Prophylaxis: sequential compression device  Stress Ulcer Prophylaxis: Prophylaxis Not Indicated     ABCDE Protocol (if indicated)  Plan to perform spontaneous awakening trial today? Not applicable  Plan to perform spontaneous breathing trial today? Not applicable  Obvious barriers to extubation? Not applicable  CAM-ICU: Negative    Invasive Devices Review  Invasive Devices  Report    Peripheral Intravenous Line  Duration           Peripheral IV 08/29/22 Left Antecubital 1 day    Peripheral IV 08/29/22 Right Forearm 1 day          Drain  Duration           External Urinary Catheter Small <1 day              Can any invasive devices be discontinued today?  Not applicable  ---------------------------------------------------------------------------------------  OBJECTIVE    Physical Exam    Vitals   Vitals:    22 0800 22 0900 22 1000 22 1100   BP: 132/73 140/76 116/66 135/76   BP Location:       Pulse: 76 76 74 72   Resp: 13 12 14 20   Temp: 98 3 °F (36 8 °C)      TempSrc: Oral      SpO2: 95% 95% 94% 97%   Weight:       Height:         Temp (24hrs), Av °F (37 2 °C), Min:98 3 °F (36 8 °C), Max:99 3 °F (37 4 °C)  Current: Temperature: 98 3 °F (36 8 °C)  HR:  72  BP:  135/76  RR:  20  SpO2:  97%    Respiratory:  SpO2: SpO2: 97 %       Invasive/non-invasive ventilation settings   Respiratory  Report   Lab Data (Last 4 hours)    None         O2/Vent Data (Last 4 hours)    None                Height and Weights   Height: 5' 4" (162 6 cm)  IBW (Ideal Body Weight): 59 2 kg  Body mass index is 31 07 kg/m²  Weight (last 2 days)     Date/Time Weight    22 1040 82 1 (181)          Intake and Output  I/O        07 07    P  O  40 50     I V  (mL/kg) 343 3 (4 2) 538 2 (6 6)     NG/GT       IV Piggyback 50      Feedings 100      Total Intake(mL/kg) 533 3 (6 5) 588 2 (7 2)     Urine (mL/kg/hr) 1235 (0 6) 637 (0 3)     Stool 0      Total Output 1235 637     Net -701 7 -48  8            Unmeasured Stool Occurrence 3 x          UOP:  637 ml over the last 24 H     Nutrition       Diet Orders   (From admission, onward)             Start     Ordered    22 1050  Diet Dysphagia/Modified Consistency; Dysphagia 1-Pureed;  Nectar Thick Liquid; Consistent Carbohydrate Diet Level 3 (6 carb servings/90 grams CHO/meal)  Diet effective now        References:    Nutrtion Support Algorithm Enteral vs  Parenteral   Question Answer Comment   Diet Type Dysphagia/Modified Consistency    Dysphagia/Modified Consistency Dysphagia 1-Pureed    Liquid Modifier Nectar Thick Liquid    Other Restriction(s): Consistent Carbohydrate Diet Level 3 (6 carb servings/90 grams CHO/meal)    RD to adjust diet per protocol?  Yes        08/31/22 1049    08/31/22 1050  Dietary nutrition supplements  Once        Question Answer Comment   Select Supplement: Ensure Pudding-Chocolate    Frequency Lunch        08/31/22 1049    08/31/22 1050  Dietary nutrition supplements  Once        Question Answer Comment   Select Supplement: Ensure Pudding-Vanilla    Frequency Breakfast, Lunch        08/31/22 1049                Laboratory and Diagnostics:  Results from last 7 days   Lab Units 08/31/22  0528 08/30/22  0514 08/29/22  0531 08/28/22  0807 08/27/22  0512 08/26/22  1043 08/26/22  1042   WBC Thousand/uL 8 99 8 34 7 67 12 93* 12 83*  --  6 77   HEMOGLOBIN g/dL 15 1 14 4 13 5 15 0 15 0  --  17 5*   I STAT HEMOGLOBIN g/dl  --   --   --   --   --  17 0  --    HEMATOCRIT % 46 5 42 7 40 3 44 5 43 7  --  49 7*   HEMATOCRIT, ISTAT %  --   --   --   --   --  50*  --    PLATELETS Thousands/uL 268 217 200 268 274  --  288   NEUTROS PCT %  --   --   --   --  92*  --   --    MONOS PCT %  --   --   --   --  3*  --   --      Results from last 7 days   Lab Units 08/31/22  0528 08/30/22  0514 08/29/22  0531 08/28/22  0807 08/27/22  1728 08/27/22  0512 08/26/22  1043 08/26/22  1042   SODIUM mmol/L 138 142 142 140 143 142  --  137   POTASSIUM mmol/L 4 1 3 8 3 6 5 1 4 3 4 0  --  3 7   CHLORIDE mmol/L 112* 109* 109* 111* 114* 112*  --  101   CO2 mmol/L 21 28 25 24 25 26  --  25   CO2, I-STAT mmol/L  --   --   --   --   --   --  29  --    ANION GAP mmol/L 5 5 8 5 4 4  --  11   BUN mg/dL 25 24 24 20 28* 31*  --  16   CREATININE mg/dL 0 86 0 90 1 09 1 01 1 16 1 53*  --  0 92   CALCIUM mg/dL 7 9* 7 8* 8 3 8 1* 8 1* 8 8  --  8 9   GLUCOSE RANDOM mg/dL 150* 128 314* 212* 164* 235*  --  311*   ALT U/L  --   --   --   --   --  40  --   --    AST U/L  --   --   --   --   --  12  --   --    ALK PHOS U/L  --   --   --   --   --  75  --   --    ALBUMIN g/dL  --   --   --   --   -- 3 3*  --   --    TOTAL BILIRUBIN mg/dL  --   --   --   --   --  2 00*  --   --      Results from last 7 days   Lab Units 08/31/22  0528 08/29/22  0531 08/28/22  0807 08/27/22  0512   MAGNESIUM mg/dL 2 7* 2 5 2 3 2 1   PHOSPHORUS mg/dL 3 1 4 6* 3 3 2 3      Results from last 7 days   Lab Units 08/26/22  1042   INR  1 04   PTT seconds 24              ABG:    VBG:    Results from last 7 days   Lab Units 08/30/22  0514 08/28/22  1737   PROCALCITONIN ng/ml 0 12 0 16       Micro  Results from last 7 days   Lab Units 08/28/22  1748 08/28/22  1737   BLOOD CULTURE  No Growth at 48 hrs  No Growth at 48 hrs  EKG: N/A  Imaging:  I have personally reviewed pertinent reports        Active Medications  Scheduled Meds:  Current Facility-Administered Medications   Medication Dose Route Frequency Provider Last Rate    acetaminophen  650 mg Oral Q6H Mary Almanzar MD      amLODIPine  10 mg Oral Daily Lynda Watson MD      atorvastatin  40 mg Oral After [facility] [employer], DO      bisacodyl  10 mg Rectal Daily PRN Lynda Watson MD      butalbital-acetaminophen-caffeine  1 tablet Oral Q6H PRN Nidia Chan,       carvedilol  12 5 mg Oral BID With Meals Teressa Molina MD      chlorhexidine  15 mL Mouth/Throat Q12H [address] Mary Almanzar MD      chlorthalidone  25 mg Oral Daily [place], [place]      hydrALAZINE  10 mg Intravenous Q4H PRN Lynda Watson MD      insulin glargine  20 Units Subcutaneous QAM Taj Rodriguez MD      insulin lispro  2-12 Units Subcutaneous 4x Daily (with meals and at bedtime) Anderson Abbott MD      labetalol  20 mg Intravenous Y6V PRN Teressa Molina MD      lisinopril  40 mg Oral Daily Anderson Abbott MD      niCARdipine  1-15 mg/hr Intravenous Titrated Anderson Abbott MD Stopped (08/31/22 1031)    ondansetron  4 mg Intravenous Q6H PRN Lynda Watson MD      oxyCODONE  5 mg Oral Q4H PRN Lynda Watson MD     Jakob Abt oxyCODONE  7 5 mg Oral Q4H PRN Sharita Wilson MD      polyethylene glycol  17 g Oral Daily Erinn Borrero MD      senna-docusate sodium  2 tablet Oral BID Erinn Borrero MD       Continuous Infusions:  niCARdipine, 1-15 mg/hr, Last Rate: Stopped (08/31/22 1031)      PRN Meds:   bisacodyl, 10 mg, Daily PRN  butalbital-acetaminophen-caffeine, 1 tablet, Q6H PRN  hydrALAZINE, 10 mg, Q4H PRN  labetalol, 20 mg, Q4H PRN  ondansetron, 4 mg, Q6H PRN  oxyCODONE, 5 mg, Q4H PRN  oxyCODONE, 7 5 mg, Q4H PRN        Allergies   Not on File    Advance Directive and Living Will:      Power of :    POLST:      Counseling / Coordination of Care  Total Critical Care time spent 30 minutes excluding procedures, teaching and family updates  Shawn Aparicio MD      Portions of the record may have been created with voice recognition software  Occasional wrong word or "sound a like" substitutions may have occurred due to the inherent limitations of voice recognition software    Read the chart carefully and recognize, using context, where substitutions have occurred
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
## 2022-08-31 NOTE — PLAN OF CARE
Problem: PHYSICAL THERAPY ADULT  Goal: Performs mobility at highest level of function for planned discharge setting  See evaluation for individualized goals  Description: Treatment/Interventions: ADL retraining, Functional transfer training, LE strengthening/ROM, Elevations, Therapeutic exercise, Endurance training, Cognitive reorientation, Patient/family training, Equipment eval/education, Bed mobility, Gait training, Spoke to nursing, OT, Family, Compensatory technique education, Continued evaluation, Spoke to MD  Equipment Recommended:  (TBD)       See flowsheet documentation for full assessment, interventions and recommendations  Note: Prognosis: Fair  Problem List: Decreased strength, Decreased endurance, Impaired balance, Decreased mobility, Decreased cognition  Assessment: Pt presents to therapy today with reduced mobility, poor endurance, poor sitting tolerance and balance, poor standing tolerance and balance, high risk of falling, confusion, reduced B LUE And LLE strength  These impairments limit the patient by requiring increased assistance for mobility and places her at risk of falling  Pt would benefit from continued skilled therapy while in the hospital to improve overall mobility and work towards a safe d/c  Recommend rehab  At end of session patient was left seated with call bell within reach  The patient's AM-PAC Basic Mobility Inpatient Short Form Raw Score is 10  A Raw score of less than or equal to 16 suggests the patient may benefit from discharge to post-acute rehabilitation services  Please also refer to the recommendation of the Physical Therapist for safe discharge planning  Pt verbal throughout session and able to communicate occ, but not consistently  Barriers to Discharge: Inaccessible home environment     PT Discharge Recommendation: Post acute rehabilitation services    See flowsheet documentation for full assessment

## 2022-08-31 NOTE — PROGRESS NOTES
Sita Munoz wife contacted me at change of shift with concern for systolic bp of 877  I immediately went in room and gave lobetalol, which corrected problem and systolic went to 024W  Wife still was upset  I explained how we closely monitor patients in the nurse's station and how we do hourly checks  Pt's wife then claimed, "I did not care about pt" and "I seemed too calm in the scenario"  She then went to charge nurse- Ashwin Polk

## 2022-08-31 NOTE — UTILIZATION REVIEW
Continued Stay Review    Date: 8/31/22                          Current Patient Class: INPT  Current Level of Care: CRITICAL CARE    HPI:61 y o  male initially admitted INPT on 8/26 D/T  Acute right thalamic IPH with intraventricular extension and surrounding vasogenic edema w/ mass effect  Assessment/Plan: more lethargic on 8/30   somnolent today, but after turning the lights on in the room, repeated sternal run/verbal stimuli, and sitting the patient up in bed, he became more alert  Paucity of speech throughout the exam   Patient stated that his headache is a 4/10 pain, but otherwise remains nonverbal and does not answer other orientation questions  Repeat CT head showed increased vasogenic edema around the right thalamic hemorrhage  Continues to have intraventricular extension of hemorrhage into the 3rd ventricle, but no new hemorrhage and resolved hemorrhage in the 4th ventricle  Etiology for R IPH likely secondary to acute ischemic stroke with hemorrhagic conversion  Etiology for strokes remains unclear, but due to strokes being bi-hemispheric, embolic workup in process  Patient continues to be somnolent, but once awake, exam is stable (flaccid in the left UE/LE, left facial droop, and profound sensory deficits on the left side)Recommend Natividad patch/loop recorder as an outpatient  Thrombosis panel in process; thus far unremarkable  Repeat CTH approximately 2 weeks post stroke (9/9/2022) per neurosurgery  Per attending neurologist, if CT head is stable in 2 weeks, then recommend starting aspirin 81 mg daily  Hold Antiplatelets and anticoagulants at this time in the setting of IPH with intraventricular extension  Continue Atorvastatin  Cardene drip  Tele  Frequent neuro checks  PT/OT/ST          Vital Signs:   08/31/22 0900 -- 76 12 140/76 96 95 % -- --   08/31/22 0800 -- 76 13 132/73 96 95 % -- --   08/31/22 0700 -- 78 13 132/75 93 94 % -- --   08/31/22 0600 -- 84 15 126/72 90 95 % -- --   08/31/22 0500 -- 82 15 116/56 72 95 % -- --   08/31/22 0400 99 3 °F (37 4 °C) 86 18 165/85 111 96 % None (Room air) Lying   08/31/22 0300 -- 90 20 131/79 99 96 % -- --   08/31/22 0200 -- 82 16 131/67 90 96 % -- --   08/31/22 0100 -- 74 16 150/78 98 95 % -- --   08/31/22 0000 99 °F (37 2 °C) 84 18 120/61 78 95 % None (Room air) Lying   08/30/22 2300 -- 82 13 153/90 109 96 % -- --   08/30/22 2100 -- 100 20 122/59 77 96 % -- --   08/30/22 2000 99 2 °F (37 3 °C) 76 16 134/81 102 96 % None (Room air) Lying   08/30/22 1900 -- 82 16 139/78 98 96 % -- --   08/30/22 1721 -- -- -- 122/72 -- -- -- --   08/30/22 1700 -- 86 12 123/66 85 -- -- --   08/30/22 1615 -- 86 14 140/74 90 93 % -- --   08/30/22 1600 -- 94 14 144/75 103 94 % -- --   08/30/22 1558 -- 86 -- 144/75 -- -- -- --   08/30/22 1545 -- 90 16 145/74 95 95 % -- --   08/30/22 1515 -- 96 19 129/70 88 95 % -- --   08/30/22 1500 -- 86 14 124/71 88 95 % -- --   08/30/22 1445 -- 84 15 127/74 91 95 % -- --   08/30/22 1300 -- 96 19 139/74 93 95 % -- --   08/30/22 1200 -- 82 14 138/79 100 94 % -- --   08/30/22 1130 -- -- 22 -- -- -- -- --   08/30/22 1100 -- 84 15 102/63 76 95 % -- --   08/30/22 1040 -- 98 -- 117/77 -- -- -- --   08/30/22 0945 98 7 °F (37 1 °C) 86 14 -- -- 96 % -- --   08/30/22 0930 -- 100 18 140/75 97 97 % -- --   08/30/22 0900 -- 98 13 117/77 95 97 % -- --   08/30/22 0800 -- 88 16 146/78 97 96 % -- --   08/30/22 0700 -- 92 13 126/67 95 96 % -- --   08/30/22 0600 -- 96 16 119/66 88 96 % -- --   08/30/22 0500 -- 98 11 Abnormal  133/73 91 96 % -- --   08/30/22 0400 99 1 °F (37 3 °C) 90 15 127/73 88 94 % None (Room air) Lying   08/30/22 0300 -- 90 27 Abnormal  144/83 104 95 % -- --   08/30/22 0200 -- 94 15 159/92 111 97 % -- --   08/30/22 0100 -- 76 14 146/80 106 96 % -- --   08/30/22 0000 98 9 °F (37 2 °C) 84 14 149/87 106 95 % None (Room air) Lying   08/29/22 2300 -- 86 13 151/85 106 96 % -- --   08/29/22 2200 -- 92 14 122/57 84 95 % -- --   08/29/22 2100 -- 82 15 140/72 93 95 % -- --   08/29/22 2000 97 8 °F (36 6 °C) 88 17 131/64 86 97 % None (Room air) Lying   08/29/22 1900 -- 92 15 128/68 84 96 % -- --   08/29/22 1800 -- 86 14 115/58 72 96 % -- --   08/29/22 1700 99 °F (37 2 °C) 90 15 116/74 89 95 % -- --   08/29/22 1600 -- 106 Abnormal  16 147/71 92 95 % None (Room air) --   08/29/22 1500 -- 104 24 Abnormal  -- -- 96 % -- --   08/29/22 1400 -- 108 Abnormal  19 163/88 111 96 % -- --   08/29/22 1300 -- 96 15 159/76 104 97 % -- --   08/29/22 1235 -- 90 13 157/79 102 97 % -- --   08/29/22 1200 -- 88 15 165/89 112 96 % -- --   08/29/22 1100 -- 90 14 167/87 110 97 % -- --   08/29/22 1030 -- 98 11 Abnormal  174/93 Abnormal  119 96 % -- --   08/29/22 0900 98 2 °F (36 8 °C) 94 15 165/94 110 97 % -- --   08/29/22 0800 -- 88 13 -- -- 96 % -- --   08/29/22 0700 -- 80 13 131/79 106 96 % -- --   08/29/22 0600 -- 78 13 139/69 85 93 % -- --   08/29/22 0500 -- 82 13 136/69 90 94 % -- --   08/29/22 0400 98 8 °F (37 1 °C) 82 13 127/68 86 95 % None (Room air) Lying   08/29/22 0300 -- 84 -- 106/66 91 95 % -- --   08/29/22 0200 -- 86 -- 135/70 88 94 % -- --   08/29/22 0100 -- 96 20 149/73 101 94 % -- --   08/29/22 0000 99 °F (37 2 °C) 88 16 125/68 89 94 % None (Room air) Lying     8/28: 101 4-96-/81-95%    Pertinent Labs/Diagnostic Results:   8/28 ekg:Sinus tachycardia  Minimal voltage criteria for LVH, may be normal variant  Septal infarct , age undetermined  ST & T wave abnormality, consider inferolateral ischemia  Abnormal ECG    8/30 FRENCH:  Left Ventricle: Left ventricular cavity size is normal  Wall thickness is normal  There is no concentric hypertrophy  The left ventricular ejection fraction is 60%   Systolic function is normal  Wall motion is normal     The left ventricular wall motion is normal     Right Ventricle: Right ventricular cavity size is normal  Systolic function is normal     Atrial Septum: No patent foramen ovale detected using color flow Doppler and saline contrast injection at rest and on provocation with abdominal compression    Left Atrial Appendage: There is normal function  There is no thrombus  There is no mass  There is no spontaneous echo contrast         8/28 cxr:No acute cardiopulmonary disease  8/29 barium swallow study:Pt presents w/ mod oropharyngeal dysphagia maryjane by reduced manipulation of solids, reduced transfers  Noted mild swallow delay, reduced airway protection during the swallow w/ thin by cup/straw resulting in aspiration  Aspiration events were silent  Attempted head down, mult swallows  Pt w/ noted delayed 2* swallows to clear some mild/mod pooled material in the pharynx          Per gross esophageal screen:  [facility]     Recommendations:  Diet: puree  Liquids: nectar thick by cup  Allow thin water & ice in between meals following oral care  Meds: crush in puree  Strategies: small sips, mult swallows  Frequent oral care  Upright position  F/u ST tx: yes  Therapy Prognosis: fair   Prognosis considerations: family support, pt participation   Full Supervision  Aspiration Precautions    8/29 MRI cervical spine:Motion degraded examination      Multilevel degenerative disc disease and diffuse disc osteophyte complexes with superimposed disc protrusions at the C3-4 through the C6-7 levels causing cord impingement at the C3-4 through the C4-5 levels  There is no cord signal abnormality to   suggest myelomalacia or edema  8/30 CT head: There is increasing vasogenic edema around the previously noted right involving thalamic hemorrhage      Again noted is intraventricular extension of the hemorrhage into the 3rd ventricle, occipital horn with dilation of the ventricular system, stable     Mass effect with mild midline shift, stable     There is no new hemorrhage seen  Resolving hemorrhage in the 4th ventricle    Results from last 7 days   Lab Units 08/28/22  1631   SARS-COV-2  Negative     Results from last 7 days   Lab Units 08/31/22  0528 08/30/22  0514 08/29/22  0531 08/28/22  0807 08/27/22  0512   WBC Thousand/uL 8 99 8 34 7 67 12 93* 12 83*   HEMOGLOBIN g/dL 15 1 14 4 13 5 15 0 15 0   HEMATOCRIT % 46 5 42 7 40 3 44 5 43 7   PLATELETS Thousands/uL 268 217 200 268 274   NEUTROS ABS Thousands/µL  --   --   --   --  11 86*         Results from last 7 days   Lab Units 08/31/22  0621 08/31/22  0528 08/30/22  0514 08/29/22  0531 08/28/22  0807 08/27/22  1728 08/27/22  0512 08/26/22  1043   SODIUM mmol/L  --  138 142 142 140 143 142  --    POTASSIUM mmol/L  --  4 1 3 8 3 6 5 1 4 3 4 0  --    CHLORIDE mmol/L  --  112* 109* 109* 111* 114* 112*  --    CO2 mmol/L  --  21 28 25 24 25 26  --    CO2, I-STAT mmol/L  --   --   --   --   --   --   --  29   ANION GAP mmol/L  --  5 5 8 5 4 4  --    BUN mg/dL  --  25 24 24 20 28* 31*  --    CREATININE mg/dL  --  0 86 0 90 1 09 1 01 1 16 1 53*  --    EGFR ml/min/1 73sq m  --  92 91 72 79 67 48  --    CALCIUM mg/dL  --  7 9* 7 8* 8 3 8 1* 8 1* 8 8  --    CALCIUM, IONIZED mmol/L 0 98*  --   --   --   --   --   --   --    CALCIUM, IONIZED, ISTAT mmol/L  --   --   --   --   --   --   --  1 11*   MAGNESIUM mg/dL  --  2 7*  --  2 5 2 3  --  2 1  --    PHOSPHORUS mg/dL  --  3 1  --  4 6* 3 3  --  2 3  --      Results from last 7 days   Lab Units 08/27/22  0512   AST U/L 12   ALT U/L 40   ALK PHOS U/L 75   TOTAL PROTEIN g/dL 7 0   ALBUMIN g/dL 3 3*   TOTAL BILIRUBIN mg/dL 2 00*     Results from last 7 days   Lab Units 08/31/22  0612 08/31/22  0004 08/30/22  1719 08/30/22  1230 08/29/22  2356 08/29/22  1721 08/29/22  1127 08/29/22  0930 08/29/22  0522 08/28/22  2340 08/28/22  2057 08/28/22  2014   POC GLUCOSE mg/dl 152* 159* 195* 135 123 200* 185* 171* 317* 238* 256* 251*     Results from last 7 days   Lab Units 08/31/22  0528 08/30/22  0514 08/29/22  0531 08/28/22  0807 08/27/22  1728 08/27/22  0512 08/26/22  1042   GLUCOSE RANDOM mg/dL 150* 128 314* 212* 164* 235* 311*         Results from last 7 days   Lab Units 08/27/22  0512   HEMOGLOBIN A1C % 9 8* EAG mg/dl 235     No results found for: BETA-HYDROXYBUTYRATE           Results from last 7 days   Lab Units 08/26/22  1043   PH, SHA I-STAT  7 413*   PCO2, SHA ISTAT mm HG 43 4   PO2, SHA ISTAT mm HG 67 0*   HCO3, SHA ISTAT mmol/L 27 7   I STAT BASE EXC mmol/L 3   I STAT O2 SAT % 93*         Results from last 7 days   Lab Units 08/26/22  1042   HS TNI 0HR ng/L 5         Results from last 7 days   Lab Units 08/26/22  1042   PROTIME seconds 13 8   INR  1 04   PTT seconds 24         Results from last 7 days   Lab Units 08/30/22  0514 08/28/22  1737   PROCALCITONIN ng/ml 0 12 0 16       Results from last 7 days   Lab Units 08/27/22  1225   CLARITY UA  Clear   COLOR UA  Yellow   SPEC GRAV UA  1 024   PH UA  5 5   GLUCOSE UA mg/dl 300 (3/10%)*   KETONES UA mg/dl Trace*   BLOOD UA  Large*   PROTEIN UA mg/dl 50 (1+)*   NITRITE UA  Negative   BILIRUBIN UA  Negative   UROBILINOGEN UA (BE) mg/dl <2 0   LEUKOCYTES UA  Negative   WBC UA /hpf 4-10*   RBC UA /hpf 20-30*   BACTERIA UA /hpf Moderate*   EPITHELIAL CELLS WET PREP /hpf Occasional   MUCUS THREADS  Moderate*     Results from last 7 days   Lab Units 08/28/22  1631   INFLUENZA A PCR  Negative   INFLUENZA B PCR  Negative   RSV PCR  Negative       Results from last 7 days   Lab Units 08/28/22  1748 08/28/22  1737   BLOOD CULTURE  No Growth at 48 hrs  No Growth at 48 hrs           Medications:   Scheduled Medications:  acetaminophen, 650 mg, Oral, Q6H  amLODIPine, 10 mg, Oral, Daily  atorvastatin, 40 mg, Oral, After Dinner  carvedilol, 12 5 mg, Oral, BID With Meals  chlorhexidine, 15 mL, Mouth/Throat, Q12H LUI  chlorthalidone, 25 mg, Oral, Daily  insulin glargine, 20 Units, Subcutaneous, QAM  insulin lispro, 2-12 Units, Subcutaneous, 4x Daily (with meals and at bedtime)  lisinopril, 40 mg, Oral, Daily  polyethylene glycol, 17 g, Oral, Daily  senna-docusate sodium, 2 tablet, Oral, BID      Continuous IV Infusions:  niCARdipine, 1-15 mg/hr, Intravenous, Titrated      PRN Meds:  bisacodyl, 10 mg, Rectal, Daily PRN  butalbital-acetaminophen-caffeine, 1 tablet, Oral, Q6H PRN  hydrALAZINE, 10 mg, Intravenous, Q4H PRN  labetalol, 20 mg, Intravenous, Q4H PRN  ondansetron, 4 mg, Intravenous, Q6H PRN  oxyCODONE, 5 mg, Oral, Q4H PRN  oxyCODONE, 7 5 mg, Oral, Q4H PRN        Discharge Plan: D    Network Utilization Review Department  ATTENTION: Please call with any questions or concerns to [phone] and carefully listen to the prompts so that you are directed to the right person  All voicemails are confidential   Tereza Linares all requests for admission clinical reviews, approved or denied determinations and any other requests to dedicated fax number below belonging to the campus where the patient is receiving treatment   List of dedicated fax numbers for the Facilities:  [address] DENIALS (Administrative/Medical Necessity) [phone]   [address] (Maternity/NICU/Pediatrics) [phone]   [address] [place] [address]  [address] [facility] [place] [id number] [place] [address] Reggie Lewis [address] [facility] [place] [address] [phone]

## 2022-08-31 NOTE — PLAN OF CARE
Problem: MOBILITY - ADULT  Goal: Maintain or return to baseline ADL function  Description: INTERVENTIONS:  -  Assess patient's ability to carry out ADLs; assess patient's baseline for ADL function and identify physical deficits which impact ability to perform ADLs (bathing, care of mouth/teeth, toileting, grooming, dressing, etc )  - Assess/evaluate cause of self-care deficits   - Assess range of motion  - Assess patient's mobility; develop plan if impaired  - Assess patient's need for assistive devices and provide as appropriate  - Encourage maximum independence but intervene and supervise when necessary  - Involve family in performance of ADLs  - Assess for home care needs following discharge   - Consider OT consult to assist with ADL evaluation and planning for discharge  - Provide patient education as appropriate  Outcome: Progressing  Goal: Maintains/Returns to pre admission functional level  Description: INTERVENTIONS:  - Perform BMAT or MOVE assessment daily    - Set and communicate daily mobility goal to care team and patient/family/caregiver  - Collaborate with rehabilitation services on mobility goals if consulted  - Perform Range of Motion  times a day  - Reposition patient every  hours    - Dangle patient  times a day  - Stand patient times a day  - Ambulate patient  times a day  - Out of bed to chair  times a day   - Out of bed for meals  times a day  - Out of bed for toileting  - Record patient progress and toleration of activity level   Outcome: Progressing     Problem: PAIN - ADULT  Goal: Verbalizes/displays adequate comfort level or baseline comfort level  Description: Interventions:  - Encourage patient to monitor pain and request assistance  - Assess pain using appropriate pain scale  - Administer analgesics based on type and severity of pain and evaluate response  - Implement non-pharmacological measures as appropriate and evaluate response  - Consider cultural and social influences on pain and pain management  - Notify physician/advanced practitioner if interventions unsuccessful or patient reports new pain  Outcome: Progressing     Problem: INFECTION - ADULT  Goal: Absence or prevention of progression during hospitalization  Description: INTERVENTIONS:  - Assess and monitor for signs and symptoms of infection  - Monitor lab/diagnostic results  - Monitor all insertion sites, i e  indwelling lines, tubes, and drains  - Monitor endotracheal if appropriate and nasal secretions for changes in amount and color  - [place] appropriate cooling/warming therapies per order  - Administer medications as ordered  - Instruct and encourage patient and family to use good hand hygiene technique  - Identify and instruct in appropriate isolation precautions for identified infection/condition  Outcome: Progressing  Goal: Absence of fever/infection during neutropenic period  Description: INTERVENTIONS:  - Monitor WBC    Outcome: Progressing     Problem: SAFETY ADULT  Goal: Maintain or return to baseline ADL function  Description: INTERVENTIONS:  -  Assess patient's ability to carry out ADLs; assess patient's baseline for ADL function and identify physical deficits which impact ability to perform ADLs (bathing, care of mouth/teeth, toileting, grooming, dressing, etc )  - Assess/evaluate cause of self-care deficits   - Assess range of motion  - Assess patient's mobility; develop plan if impaired  - Assess patient's need for assistive devices and provide as appropriate  - Encourage maximum independence but intervene and supervise when necessary  - Involve family in performance of ADLs  - Assess for home care needs following discharge   - Consider OT consult to assist with ADL evaluation and planning for discharge  - Provide patient education as appropriate  Outcome: Progressing  Goal: Maintains/Returns to pre admission functional level  Description: INTERVENTIONS:  - Perform BMAT or MOVE assessment daily    - Set and communicate daily mobility goal to care team and patient/family/caregiver  - Collaborate with rehabilitation services on mobility goals if consulted  - Perform Range of Motion  times a day  - Reposition patient every  hours    - Dangle patient  times a day  - Stand patient  times a day  - Ambulate patient  times a day  - Out of bed to chair  times a day   - Out of bed for meals  times a day  - Out of bed for toileting  - Record patient progress and toleration of activity level   Outcome: Progressing  Goal: Patient will remain free of falls  Description: INTERVENTIONS:  - Educate patient/family on patient safety including physical limitations  - Instruct patient to call for assistance with activity   - Consult OT/PT to assist with strengthening/mobility   - Keep Call bell within reach  - Keep bed low and locked with side rails adjusted as appropriate  - Keep care items and personal belongings within reach  - Initiate and maintain comfort rounds  - Make Fall Risk Sign visible to staff  - Offer Toileting every  Hours, in advance of need  - Initiate/Maintain alarm  - Obtain necessary fall risk management equipment:   - Apply yellow socks and bracelet for high fall risk patients  - Consider moving patient to room near nurses station  Outcome: Progressing     Problem: DISCHARGE PLANNING  Goal: Discharge to home or other facility with appropriate resources  Description: INTERVENTIONS:  - Identify barriers to discharge w/patient and caregiver  - Arrange for needed discharge resources and transportation as appropriate  - Identify discharge learning needs (meds, wound care, etc )  - Arrange for interpretive services to assist at discharge as needed  - Refer to Case Management Department for coordinating discharge planning if the patient needs post-hospital services based on physician/advanced practitioner order or complex needs related to functional status, cognitive ability, or social support system  Outcome: Progressing Problem: Knowledge Deficit  Goal: Patient/family/caregiver demonstrates understanding of disease process, treatment plan, medications, and discharge instructions  Description: Complete learning assessment and assess knowledge base  Interventions:  - Provide teaching at level of understanding  - Provide teaching via preferred learning methods  Outcome: Progressing     Problem: Nutrition/Hydration-ADULT  Goal: Nutrient/Hydration intake appropriate for improving, restoring or maintaining nutritional needs  Description: Monitor and assess patient's nutrition/hydration status for malnutrition  Collaborate with interdisciplinary team and initiate plan and interventions as ordered  Monitor patient's weight and dietary intake as ordered or per policy  Utilize nutrition screening tool and intervene as necessary  Determine patient's food preferences and provide high-protein, high-caloric foods as appropriate       INTERVENTIONS:  - Monitor oral intake, urinary output, labs, and treatment plans  - Assess nutrition and hydration status and recommend course of action  - Evaluate amount of meals eaten  - Assist patient with eating if necessary   - Allow adequate time for meals  - Recommend/ encourage appropriate diets, oral nutritional supplements, and vitamin/mineral supplements  - Order, calculate, and assess calorie counts as needed  - Recommend, monitor, and adjust tube feedings and TPN/PPN based on assessed needs  - Assess need for intravenous fluids  - Provide specific nutrition/hydration education as appropriate  - Include patient/family/caregiver in decisions related to nutrition  Outcome: Progressing     Problem: Prexisting or High Potential for Compromised Skin Integrity  Goal: Skin integrity is maintained or improved  Description: INTERVENTIONS:  - Identify patients at risk for skin breakdown  - Assess and monitor skin integrity  - Assess and monitor nutrition and hydration status  - Monitor labs   - Assess for incontinence   - Turn and reposition patient  - Assist with mobility/ambulation  - Relieve pressure over bony prominences  - Avoid friction and shearing  - Provide appropriate hygiene as needed including keeping skin clean and dry  - Evaluate need for skin moisturizer/barrier cream  - Collaborate with interdisciplinary team   - Patient/family teaching  - Consider wound care consult   Outcome: Progressing     Problem: Potential for Falls  Goal: Patient will remain free of falls  Description: INTERVENTIONS:  - Educate patient/family on patient safety including physical limitations  - Instruct patient to call for assistance with activity   - Consult OT/PT to assist with strengthening/mobility   - Keep Call bell within reach  - Keep bed low and locked with side rails adjusted as appropriate  - Keep care items and personal belongings within reach  - Initiate and maintain comfort rounds  - Make Fall Risk Sign visible to staff  - Offer Toileting every  Hours, in advance of need  - Initiate/Maintain alarm  - Obtain necessary fall risk management equipment:   - Apply yellow socks and bracelet for high fall risk patients  - Consider moving patient to room near nurses station  Outcome: Progressing     Problem: Neurological Deficit  Goal: Neurological status is stable or improving  Description: Interventions:  - Monitor and assess patient's level of consciousness, motor function, sensory function, and level of assistance needed for ADLs  - Monitor and report changes from baseline  Collaborate with interdisciplinary team to initiate plan and implement interventions as ordered  - Provide and maintain a safe environment  - Consider seizure precautions  - Consider fall precautions  - Consider aspiration precautions  - Consider bleeding precautions  Outcome: Progressing     Problem:  Activity Intolerance/Impaired Mobility  Goal: Mobility/activity is maintained at optimum level for patient  Description: Interventions:  - Assess and monitor patient  barriers to mobility and need for assistive/adaptive devices  - Assess patient's emotional response to limitations  - Collaborate with interdisciplinary team and initiate plans and interventions as ordered  - Encourage independent activity per ability   - Maintain proper body alignment  - Perform active/passive rom as tolerated/ordered  - Plan activities to conserve energy   - Turn patient as appropriate  Outcome: Progressing     Problem: Communication Impairment  Goal: Ability to express needs and understand communication  Description: Assess patient's communication skills and ability to understand information  Patient will demonstrate use of effective communication techniques, alternative methods of communication and understanding even if not able to speak  - Encourage communication and provide alternate methods of communication as needed  - Collaborate with case management/ for discharge needs  - Include patient/family/caregiver in decisions related to communication  Outcome: Progressing     Problem: Potential for Aspiration  Goal: Ventilated patient's risk of aspiration is minimized  Description: Assess and monitor vital signs, respiratory status, airway cuff pressure, and labs (WBC)  Monitor for signs of aspiration (tachypnea, cough, rales, wheezing, cyanosis, fever)  - Elevate head of bed 30 degrees if patient has tube feeding   - Monitor tube feeding  Outcome: Progressing     Problem: Nutrition  Goal: Nutrition/Hydration status is improving  Description: Monitor and assess patient's nutrition/hydration status for malnutrition (ex- brittle hair, bruises, dry skin, pale skin and conjunctiva, muscle wasting, smooth red tongue, and disorientation)  Collaborate with interdisciplinary team and initiate plan and interventions as ordered  Monitor patient's weight and dietary intake as ordered or per policy   Utilize nutrition screening tool and intervene per policy  Determine patient's food preferences and provide high-protein, high-caloric foods as appropriate  - Assist patient with eating   - Allow adequate time for meals   - Encourage patient to take dietary supplement as ordered  - Collaborate with clinical nutritionist   - Include patient/family/caregiver in decisions related to nutrition    Outcome: Progressing

## 2022-09-01 PROBLEM — E66.9 OBESITY (BMI 30-39.9): Status: ACTIVE | Noted: 2022-01-01
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
## 2022-09-01 PROBLEM — I10 HYPERTENSION: Status: ACTIVE | Noted: 2022-01-01
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
## 2022-09-01 NOTE — PLAN OF CARE
Problem: MOBILITY - ADULT  Goal: Maintain or return to baseline ADL function  Description: INTERVENTIONS:  -  Assess patient's ability to carry out ADLs; assess patient's baseline for ADL function and identify physical deficits which impact ability to perform ADLs (bathing, care of mouth/teeth, toileting, grooming, dressing, etc )  - Assess/evaluate cause of self-care deficits   - Assess range of motion  - Assess patient's mobility; develop plan if impaired  - Assess patient's need for assistive devices and provide as appropriate  - Encourage maximum independence but intervene and supervise when necessary  - Involve family in performance of ADLs  - Assess for home care needs following discharge   - Consider OT consult to assist with ADL evaluation and planning for discharge  - Provide patient education as appropriate  Outcome: Progressing  Goal: Maintains/Returns to pre admission functional level  Description: INTERVENTIONS:  - Perform BMAT or MOVE assessment daily    - Set and communicate daily mobility goal to care team and patient/family/caregiver     - Collaborate with rehabilitation services on mobility goals if consulted  - Out of bed for toileting  - Record patient progress and toleration of activity level   Outcome: Progressing     Problem: PAIN - ADULT  Goal: Verbalizes/displays adequate comfort level or baseline comfort level  Description: Interventions:  - Encourage patient to monitor pain and request assistance  - Assess pain using appropriate pain scale  - Administer analgesics based on type and severity of pain and evaluate response  - Implement non-pharmacological measures as appropriate and evaluate response  - Consider cultural and social influences on pain and pain management  - Notify physician/advanced practitioner if interventions unsuccessful or patient reports new pain  Outcome: Progressing     Problem: INFECTION - ADULT  Goal: Absence or prevention of progression during hospitalization  Description: INTERVENTIONS:  - Assess and monitor for signs and symptoms of infection  - Monitor lab/diagnostic results  - Monitor all insertion sites, i e  indwelling lines, tubes, and drains  - Monitor endotracheal if appropriate and nasal secretions for changes in amount and color  - [place] appropriate cooling/warming therapies per order  - Administer medications as ordered  - Instruct and encourage patient and family to use good hand hygiene technique  - Identify and instruct in appropriate isolation precautions for identified infection/condition  Outcome: Progressing  Goal: Absence of fever/infection during neutropenic period  Description: INTERVENTIONS:  - Monitor WBC    Outcome: Progressing     Problem: SAFETY ADULT  Goal: Maintain or return to baseline ADL function  Description: INTERVENTIONS:  -  Assess patient's ability to carry out ADLs; assess patient's baseline for ADL function and identify physical deficits which impact ability to perform ADLs (bathing, care of mouth/teeth, toileting, grooming, dressing, etc )  - Assess/evaluate cause of self-care deficits   - Assess range of motion  - Assess patient's mobility; develop plan if impaired  - Assess patient's need for assistive devices and provide as appropriate  - Encourage maximum independence but intervene and supervise when necessary  - Involve family in performance of ADLs  - Assess for home care needs following discharge   - Consider OT consult to assist with ADL evaluation and planning for discharge  - Provide patient education as appropriate  Outcome: Progressing  Goal: Maintains/Returns to pre admission functional level  Description: INTERVENTIONS:  - Perform BMAT or MOVE assessment daily    - Set and communicate daily mobility goal to care team and patient/family/caregiver     - Collaborate with rehabilitation services on mobility goals if consulted  - Out of bed for toileting  - Record patient progress and toleration of activity level   Outcome: Progressing  Goal: Patient will remain free of falls  Description: INTERVENTIONS:  - Educate patient/family on patient safety including physical limitations  - Instruct patient to call for assistance with activity   - Consult OT/PT to assist with strengthening/mobility   - Keep Call bell within reach  - Keep bed low and locked with side rails adjusted as appropriate  - Keep care items and personal belongings within reach  - Initiate and maintain comfort rounds  - Make Fall Risk Sign visible to staff  - Apply yellow socks and bracelet for high fall risk patients  - Consider moving patient to room near nurses station  Outcome: Progressing     Problem: DISCHARGE PLANNING  Goal: Discharge to home or other facility with appropriate resources  Description: INTERVENTIONS:  - Identify barriers to discharge w/patient and caregiver  - Arrange for needed discharge resources and transportation as appropriate  - Identify discharge learning needs (meds, wound care, etc )  - Arrange for interpretive services to assist at discharge as needed  - Refer to Case Management Department for coordinating discharge planning if the patient needs post-hospital services based on physician/advanced practitioner order or complex needs related to functional status, cognitive ability, or social support system  Outcome: Progressing     Problem: Knowledge Deficit  Goal: Patient/family/caregiver demonstrates understanding of disease process, treatment plan, medications, and discharge instructions  Description: Complete learning assessment and assess knowledge base  Interventions:  - Provide teaching at level of understanding  - Provide teaching via preferred learning methods  Outcome: Progressing     Problem: Nutrition/Hydration-ADULT  Goal: Nutrient/Hydration intake appropriate for improving, restoring or maintaining nutritional needs  Description: Monitor and assess patient's nutrition/hydration status for malnutrition  Collaborate with interdisciplinary team and initiate plan and interventions as ordered  Monitor patient's weight and dietary intake as ordered or per policy  Utilize nutrition screening tool and intervene as necessary  Determine patient's food preferences and provide high-protein, high-caloric foods as appropriate       INTERVENTIONS:  - Monitor oral intake, urinary output, labs, and treatment plans  - Assess nutrition and hydration status and recommend course of action  - Evaluate amount of meals eaten  - Assist patient with eating if necessary   - Allow adequate time for meals  - Recommend/ encourage appropriate diets, oral nutritional supplements, and vitamin/mineral supplements  - Order, calculate, and assess calorie counts as needed  - Recommend, monitor, and adjust tube feedings and TPN/PPN based on assessed needs  - Assess need for intravenous fluids  - Provide specific nutrition/hydration education as appropriate  - Include patient/family/caregiver in decisions related to nutrition  Outcome: Progressing     Problem: Prexisting or High Potential for Compromised Skin Integrity  Goal: Skin integrity is maintained or improved  Description: INTERVENTIONS:  - Identify patients at risk for skin breakdown  - Assess and monitor skin integrity  - Assess and monitor nutrition and hydration status  - Monitor labs   - Assess for incontinence   - Turn and reposition patient  - Assist with mobility/ambulation  - Relieve pressure over bony prominences  - Avoid friction and shearing  - Provide appropriate hygiene as needed including keeping skin clean and dry  - Evaluate need for skin moisturizer/barrier cream  - Collaborate with interdisciplinary team   - Patient/family teaching  - Consider wound care consult   Outcome: Progressing     Problem: Potential for Falls  Goal: Patient will remain free of falls  Description: INTERVENTIONS:  - Educate patient/family on patient safety including physical limitations  - Instruct patient to call for assistance with activity   - Consult OT/PT to assist with strengthening/mobility   - Keep Call bell within reach  - Keep bed low and locked with side rails adjusted as appropriate  - Keep care items and personal belongings within reach  - Initiate and maintain comfort rounds  - Make Fall Risk Sign visible to staff  - Apply yellow socks and bracelet for high fall risk patients  - Consider moving patient to room near nurses station  Outcome: Progressing     Problem: Neurological Deficit  Goal: Neurological status is stable or improving  Description: Interventions:  - Monitor and assess patient's level of consciousness, motor function, sensory function, and level of assistance needed for ADLs  - Monitor and report changes from baseline  Collaborate with interdisciplinary team to initiate plan and implement interventions as ordered  - Provide and maintain a safe environment  - Consider seizure precautions  - Consider fall precautions  - Consider aspiration precautions  - Consider bleeding precautions  Outcome: Progressing     Problem: Activity Intolerance/Impaired Mobility  Goal: Mobility/activity is maintained at optimum level for patient  Description: Interventions:  - Assess and monitor patient  barriers to mobility and need for assistive/adaptive devices  - Assess patient's emotional response to limitations  - Collaborate with interdisciplinary team and initiate plans and interventions as ordered  - Encourage independent activity per ability   - Maintain proper body alignment  - Perform active/passive rom as tolerated/ordered  - Plan activities to conserve energy   - Turn patient as appropriate  Outcome: Progressing     Problem: Communication Impairment  Goal: Ability to express needs and understand communication  Description: Assess patient's communication skills and ability to understand information    Patient will demonstrate use of effective communication techniques, alternative methods of communication and understanding even if not able to speak  - Encourage communication and provide alternate methods of communication as needed  - Collaborate with case management/ for discharge needs  - Include patient/family/caregiver in decisions related to communication  Outcome: Progressing     Problem: Potential for Aspiration  Goal: Ventilated patient's risk of aspiration is minimized  Description: Assess and monitor vital signs, respiratory status, airway cuff pressure, and labs (WBC)  Monitor for signs of aspiration (tachypnea, cough, rales, wheezing, cyanosis, fever)  - Elevate head of bed 30 degrees if patient has tube feeding   - Monitor tube feeding  Outcome: Progressing     Problem: Nutrition  Goal: Nutrition/Hydration status is improving  Description: Monitor and assess patient's nutrition/hydration status for malnutrition (ex- brittle hair, bruises, dry skin, pale skin and conjunctiva, muscle wasting, smooth red tongue, and disorientation)  Collaborate with interdisciplinary team and initiate plan and interventions as ordered  Monitor patient's weight and dietary intake as ordered or per policy  Utilize nutrition screening tool and intervene per policy  Determine patient's food preferences and provide high-protein, high-caloric foods as appropriate  - Assist patient with eating   - Allow adequate time for meals   - Encourage patient to take dietary supplement as ordered  - Collaborate with clinical nutritionist   - Include patient/family/caregiver in decisions related to nutrition    Outcome: Progressing

## 2022-09-01 NOTE — ASSESSMENT & PLAN NOTE
Plan:   § SBP goal 110-140  § Amlodipine 10 mg   § lisinopril 40 mg  § Coreg 12 5 mg bid (May increase to 25 if necessary   § Chlorthalidone 25 mg   § IV Hydralazine 10 mg Q 4 PRN for SBP>140  § IV Labetalol 20 mg Q 4H PRN for SBP >140, HR >50

## 2022-09-01 NOTE — ASSESSMENT & PLAN NOTE
-FRENCH LVEF 63%, normal systolic function, no PFO, no thrombus   -Likely due to hypertension, no embolic source identified   Plan:   § Thrombosis panel pending  § Hold AC/AP  § CTH 2 weeks from stroke day, if stable okay to start ASA  § Outpatient loop recorder as per neuro   § SBP < 140  § Exam stable  § Transfer to [facility]

## 2022-09-01 NOTE — UTILIZATION REVIEW
Continued Stay Review    Date: 09/01/22                          Current Patient Class: IP  Current Level of Care: Critical Care    HPI:61 y o  male initially admitted on 8/26 for acute R thalamic intraparenchymal hemorrhage  Assessment/Plan: On exam, somnolent mid-morning, but per nursing more awake and interactive, nods appropriately, flaccid LUE & LLE, L facial droop, tongue deviates to L  Plan: hold AC/AP, continue statin, may benefit from Provigil if remains somnolent, continue cardene gtt to maintain -140, telemetry, goal euglycemia and normothermia, frequent neuro checks        Vital Signs:   Date/Time Temp Pulse Resp BP MAP (mmHg) SpO2 O2 Device   09/01/22 0800 98 4 °F (36 9 °C) 82 16 136/69 90 96 % --   09/01/22 0700 -- 72 13 157/84 116 94 % --   09/01/22 0600 -- 72 19 144/80 108 96 % --   09/01/22 0500 -- 68 14 141/74 102 95 % --   09/01/22 0400 99 °F (37 2 °C) 76 19 135/68 87 94 % None (Room air)   09/01/22 0307 -- 76 17 132/66 83 94 % --   09/01/22 0303 -- 80 16 -- -- 94 % --   09/01/22 0302 -- 80 16 -- -- 94 % --   09/01/22 0301 -- 80 16 -- -- 95 % --   09/01/22 0300 -- 82 17 156/82 103 96 % --   09/01/22 0200 -- 78 15 137/73 92 95 % --   09/01/22 0100 -- 80 12 135/70 101 96 % --   09/01/22 0000 99 3 °F (37 4 °C) 70 12 123/62 80 94 % None (Room air)   08/31/22 2300 -- 70 13 144/79 98 95 % --   08/31/22 2200 -- 72 11   Abnormal  128/75 91 95 % --   08/31/22 2100 -- 68 12 124/70 82 94 % --   08/31/22 2000 98 5 °F (36 9 °C) 71 15 120/71 83 93 % None (Room air)   08/31/22 1900 -- 76 12 105/65 79 93 % --   08/31/22 1800 -- 84 16 136/81 97 96 % --   08/31/22 1700 -- 76 15 145/82 100 95 % --   08/31/22 1600 98 2 °F (36 8 °C) 88 18 124/77 93 96 % --   08/31/22 1500 -- 84 15 136/75 94 96 % --   08/31/22 1400 -- 82 15 114/74 90 96 % --   08/31/22 1300 98 6 °F (37 °C) 78 14 124/73 96 95 % --         Pertinent Labs/Diagnostic Results:   Results from last 7 days   Lab Units 08/28/22  1631   SARS-COV-2 Negative     Results from last 7 days   Lab Units 09/01/22  0600 08/31/22  0528 08/30/22  0514 08/29/22  0531 08/28/22  0807 08/27/22  0512   WBC Thousand/uL 10 10 8 99 8 34 7 67 12 93* 12 83*   HEMOGLOBIN g/dL 15 4 15 1 14 4 13 5 15 0 15 0   HEMATOCRIT % 45 5 46 5 42 7 40 3 44 5 43 7   PLATELETS Thousands/uL 297 268 217 200 268 274   NEUTROS ABS Thousands/µL  --   --   --   --   --  11 86*         Results from last 7 days   Lab Units 09/01/22  0600 08/31/22  0621 08/31/22  0528 08/30/22  0514 08/29/22  0531 08/28/22  0807 08/27/22  1728 08/27/22  0512 08/26/22  1043   SODIUM mmol/L 141  --  138 142 142 140   < > 142  --    POTASSIUM mmol/L 4 0  --  4 1 3 8 3 6 5 1   < > 4 0  --    CHLORIDE mmol/L 110*  --  112* 109* 109* 111*   < > 112*  --    CO2 mmol/L 24  --  21 28 25 24   < > 26  --    CO2, I-STAT mmol/L  --   --   --   --   --   --   --   --  29   ANION GAP mmol/L 7  --  5 5 8 5   < > 4  --    BUN mg/dL 30*  --  25 24 24 20   < > 31*  --    CREATININE mg/dL 0 84  --  0 86 0 90 1 09 1 01   < > 1 53*  --    EGFR ml/min/1 73sq m 93  --  92 91 72 79   < > 48  --    CALCIUM mg/dL 8 1*  --  7 9* 7 8* 8 3 8 1*   < > 8 8  --    CALCIUM, IONIZED mmol/L 0 87* 0 98*  --   --   --   --   --   --   --    CALCIUM, IONIZED, ISTAT mmol/L  --   --   --   --   --   --   --   --  1 11*   MAGNESIUM mg/dL 2 8*  --  2 7*  --  2 5 2 3  --  2 1  --    PHOSPHORUS mg/dL 3 4  --  3 1  --  4 6* 3 3  --  2 3  --     < > = values in this interval not displayed       Results from last 7 days   Lab Units 08/27/22  0512   AST U/L 12   ALT U/L 40   ALK PHOS U/L 75   TOTAL PROTEIN g/dL 7 0   ALBUMIN g/dL 3 3*   TOTAL BILIRUBIN mg/dL 2 00*     Results from last 7 days   Lab Units 09/01/22  0700 08/31/22  2204 08/31/22  1733 08/31/22  1129 08/31/22  0612 08/31/22  0004 08/30/22  1719 08/30/22  1230 08/29/22  2356 08/29/22  1721 08/29/22  1127 08/29/22  0930   POC GLUCOSE mg/dl 125 157* 137 229* 152* 159* 195* 135 123 200* 185* 171*     Results from last 7 days   Lab Units 09/01/22  0600 08/31/22  0528 08/30/22  0514 08/29/22  0531 08/28/22  0807 08/27/22  1728 08/27/22  0512 08/26/22  1042   GLUCOSE RANDOM mg/dL 119 150* 128 314* 212* 164* 235* 311*         Results from last 7 days   Lab Units 08/27/22  0512   HEMOGLOBIN A1C % 9 8*   EAG mg/dl 235     Results from last 7 days   Lab Units 08/26/22  1043   PH, SHA I-STAT  7 413*   PCO2, SHA ISTAT mm HG 43 4   PO2, SHA ISTAT mm HG 67 0*   HCO3, SHA ISTAT mmol/L 27 7   I STAT BASE EXC mmol/L 3   I STAT O2 SAT % 93*         Results from last 7 days   Lab Units 08/26/22  1042   HS TNI 0HR ng/L 5         Results from last 7 days   Lab Units 08/26/22  1042   PROTIME seconds 13 8   INR  1 04   PTT seconds 24         Results from last 7 days   Lab Units 08/30/22  0514 08/28/22  1737   PROCALCITONIN ng/ml 0 12 0 16     Results from last 7 days   Lab Units 08/27/22  1225   CLARITY UA  Clear   COLOR UA  Yellow   SPEC GRAV UA  1 024   PH UA  5 5   GLUCOSE UA mg/dl 300 (3/10%)*   KETONES UA mg/dl Trace*   BLOOD UA  Large*   PROTEIN UA mg/dl 50 (1+)*   NITRITE UA  Negative   BILIRUBIN UA  Negative   UROBILINOGEN UA (BE) mg/dl <2 0   LEUKOCYTES UA  Negative   WBC UA /hpf 4-10*   RBC UA /hpf 20-30*   BACTERIA UA /hpf Moderate*   EPITHELIAL CELLS WET PREP /hpf Occasional   MUCUS THREADS  Moderate*     Results from last 7 days   Lab Units 08/28/22  1631   INFLUENZA A PCR  Negative   INFLUENZA B PCR  Negative   RSV PCR  Negative       Results from last 7 days   Lab Units 08/28/22  1748 08/28/22  1737   BLOOD CULTURE  No Growth at 72 hrs  No Growth at 72 hrs         Medications:   Scheduled Medications:  amLODIPine, 10 mg, Oral, Daily  atorvastatin, 40 mg, Oral, After Dinner  carvedilol, 12 5 mg, Oral, BID With Meals  chlorhexidine, 15 mL, Mouth/Throat, Q12H LUI  chlorthalidone, 25 mg, Oral, Daily  insulin glargine, 20 Units, Subcutaneous, QAM  insulin lispro, 2-12 Units, Subcutaneous, 4x Daily (with meals and at bedtime)  lisinopril, 40 mg, Oral, Daily  polyethylene glycol, 17 g, Oral, Daily  senna-docusate sodium, 2 tablet, Oral, BID      Continuous IV Infusions:  niCARdipine, 1-15 mg/hr, Intravenous, Titrated      PRN Meds:  acetaminophen, 650 mg, Oral, Q6H PRN  bisacodyl, 10 mg, Rectal, Daily PRN  butalbital-acetaminophen-caffeine, 1 tablet, Oral, Q6H PRN; 9/1 x1  hydrALAZINE, 10 mg, Intravenous, Q4H PRN  labetalol, 20 mg, Intravenous, Q4H PRN; 9/1 x2  ondansetron, 4 mg, Intravenous, Q6H PRN  oxyCODONE, 5 mg, Oral, Q4H PRN  oxyCODONE, 7 5 mg, Oral, Q4H PRN        Discharge Plan: D    Network Utilization Review Department  ATTENTION: Please call with any questions or concerns to [phone] and carefully listen to the prompts so that you are directed to the right person  All voicemails are confidential   Florentino Ovalles all requests for admission clinical reviews, approved or denied determinations and any other requests to dedicated fax number below belonging to the campus where the patient is receiving treatment   List of dedicated fax numbers for the Facilities:  [address] DENIALS (Administrative/Medical Necessity) [phone]   [address] (Maternity/NICU/Pediatrics) [phone]   [address]  [address] [facility] [place] [address] [id number] [id number] [place] [address] Reggie Lewis 14[address] [phone]

## 2022-09-01 NOTE — PLAN OF CARE
Problem: MOBILITY - ADULT  Goal: Maintain or return to baseline ADL function  Description: INTERVENTIONS:  -  Assess patient's ability to carry out ADLs; assess patient's baseline for ADL function and identify physical deficits which impact ability to perform ADLs (bathing, care of mouth/teeth, toileting, grooming, dressing, etc )  - Assess/evaluate cause of self-care deficits   - Assess range of motion  - Assess patient's mobility; develop plan if impaired  - Assess patient's need for assistive devices and provide as appropriate  - Encourage maximum independence but intervene and supervise when necessary  - Involve family in performance of ADLs  - Assess for home care needs following discharge   - Consider OT consult to assist with ADL evaluation and planning for discharge  - Provide patient education as appropriate  Outcome: Progressing  Goal: Maintains/Returns to pre admission functional level  Description: INTERVENTIONS:  - Perform BMAT or MOVE assessment daily    - Set and communicate daily mobility goal to care team and patient/family/caregiver  - Collaborate with rehabilitation services on mobility goals if consulted  - Perform Range of Motion  times a day  - Reposition patient every  hours    - Dangle patient  times a day  - Stand patient  times a day  - Ambulate patient  times a day  - Out of bed to chair  times a day   - Out of bed for meals  times a day  - Out of bed for toileting  - Record patient progress and toleration of activity level   Outcome: Progressing     Problem: PAIN - ADULT  Goal: Verbalizes/displays adequate comfort level or baseline comfort level  Description: Interventions:  - Encourage patient to monitor pain and request assistance  - Assess pain using appropriate pain scale  - Administer analgesics based on type and severity of pain and evaluate response  - Implement non-pharmacological measures as appropriate and evaluate response  - Consider cultural and social influences on pain and pain management  - Notify physician/advanced practitioner if interventions unsuccessful or patient reports new pain  Outcome: Progressing     Problem: INFECTION - ADULT  Goal: Absence or prevention of progression during hospitalization  Description: INTERVENTIONS:  - Assess and monitor for signs and symptoms of infection  - Monitor lab/diagnostic results  - Monitor all insertion sites, i e  indwelling lines, tubes, and drains  - Monitor endotracheal if appropriate and nasal secretions for changes in amount and color  - [place] appropriate cooling/warming therapies per order  - Administer medications as ordered  - Instruct and encourage patient and family to use good hand hygiene technique  - Identify and instruct in appropriate isolation precautions for identified infection/condition  Outcome: Progressing  Goal: Absence of fever/infection during neutropenic period  Description: INTERVENTIONS:  - Monitor WBC    Outcome: Progressing     Problem: SAFETY ADULT  Goal: Maintain or return to baseline ADL function  Description: INTERVENTIONS:  -  Assess patient's ability to carry out ADLs; assess patient's baseline for ADL function and identify physical deficits which impact ability to perform ADLs (bathing, care of mouth/teeth, toileting, grooming, dressing, etc )  - Assess/evaluate cause of self-care deficits   - Assess range of motion  - Assess patient's mobility; develop plan if impaired  - Assess patient's need for assistive devices and provide as appropriate  - Encourage maximum independence but intervene and supervise when necessary  - Involve family in performance of ADLs  - Assess for home care needs following discharge   - Consider OT consult to assist with ADL evaluation and planning for discharge  - Provide patient education as appropriate  Outcome: Progressing  Goal: Maintains/Returns to pre admission functional level  Description: INTERVENTIONS:  - Perform BMAT or MOVE assessment daily    - Set and communicate daily mobility goal to care team and patient/family/caregiver  - Collaborate with rehabilitation services on mobility goals if consulted  - Perform Range of Motion  times a day  - Reposition patient every  hours    - Dangle patient  times a day  - Stand patient  times a day  - Ambulate patient  times a day  - Out of bed to chair  times a day   - Out of bed for meals  times a day  - Out of bed for toileting  - Record patient progress and toleration of activity level   Outcome: Progressing  Goal: Patient will remain free of falls  Description: INTERVENTIONS:  - Educate patient/family on patient safety including physical limitations  - Instruct patient to call for assistance with activity   - Consult OT/PT to assist with strengthening/mobility   - Keep Call bell within reach  - Keep bed low and locked with side rails adjusted as appropriate  - Keep care items and personal belongings within reach  - Initiate and maintain comfort rounds  - Make Fall Risk Sign visible to staff  - Offer Toileting every  Hours, in advance of need  - Initiate/Maintain alarm  - Obtain necessary fall risk management equipment:   - Apply yellow socks and bracelet for high fall risk patients  - Consider moving patient to room near nurses station  Outcome: Progressing     Problem: DISCHARGE PLANNING  Goal: Discharge to home or other facility with appropriate resources  Description: INTERVENTIONS:  - Identify barriers to discharge w/patient and caregiver  - Arrange for needed discharge resources and transportation as appropriate  - Identify discharge learning needs (meds, wound care, etc )  - Arrange for interpretive services to assist at discharge as needed  - Refer to Case Management Department for coordinating discharge planning if the patient needs post-hospital services based on physician/advanced practitioner order or complex needs related to functional status, cognitive ability, or social support system  Outcome: Progressing Problem: Knowledge Deficit  Goal: Patient/family/caregiver demonstrates understanding of disease process, treatment plan, medications, and discharge instructions  Description: Complete learning assessment and assess knowledge base  Interventions:  - Provide teaching at level of understanding  - Provide teaching via preferred learning methods  Outcome: Progressing     Problem: Nutrition/Hydration-ADULT  Goal: Nutrient/Hydration intake appropriate for improving, restoring or maintaining nutritional needs  Description: Monitor and assess patient's nutrition/hydration status for malnutrition  Collaborate with interdisciplinary team and initiate plan and interventions as ordered  Monitor patient's weight and dietary intake as ordered or per policy  Utilize nutrition screening tool and intervene as necessary  Determine patient's food preferences and provide high-protein, high-caloric foods as appropriate       INTERVENTIONS:  - Monitor oral intake, urinary output, labs, and treatment plans  - Assess nutrition and hydration status and recommend course of action  - Evaluate amount of meals eaten  - Assist patient with eating if necessary   - Allow adequate time for meals  - Recommend/ encourage appropriate diets, oral nutritional supplements, and vitamin/mineral supplements  - Order, calculate, and assess calorie counts as needed  - Recommend, monitor, and adjust tube feedings and TPN/PPN based on assessed needs  - Assess need for intravenous fluids  - Provide specific nutrition/hydration education as appropriate  - Include patient/family/caregiver in decisions related to nutrition  Outcome: Progressing     Problem: Prexisting or High Potential for Compromised Skin Integrity  Goal: Skin integrity is maintained or improved  Description: INTERVENTIONS:  - Identify patients at risk for skin breakdown  - Assess and monitor skin integrity  - Assess and monitor nutrition and hydration status  - Monitor labs   - Assess for incontinence   - Turn and reposition patient  - Assist with mobility/ambulation  - Relieve pressure over bony prominences  - Avoid friction and shearing  - Provide appropriate hygiene as needed including keeping skin clean and dry  - Evaluate need for skin moisturizer/barrier cream  - Collaborate with interdisciplinary team   - Patient/family teaching  - Consider wound care consult   Outcome: Progressing     Problem: Potential for Falls  Goal: Patient will remain free of falls  Description: INTERVENTIONS:  - Educate patient/family on patient safety including physical limitations  - Instruct patient to call for assistance with activity   - Consult OT/PT to assist with strengthening/mobility   - Keep Call bell within reach  - Keep bed low and locked with side rails adjusted as appropriate  - Keep care items and personal belongings within reach  - Initiate and maintain comfort rounds  - Make Fall Risk Sign visible to staff  - Offer Toileting every  Hours, in advance of need  - Initiate/Maintain alarm  - Obtain necessary fall risk management equipment:   - Apply yellow socks and bracelet for high fall risk patients  - Consider moving patient to room near nurses station  Outcome: Progressing     Problem: Neurological Deficit  Goal: Neurological status is stable or improving  Description: Interventions:  - Monitor and assess patient's level of consciousness, motor function, sensory function, and level of assistance needed for ADLs  - Monitor and report changes from baseline  Collaborate with interdisciplinary team to initiate plan and implement interventions as ordered  - Provide and maintain a safe environment  - Consider seizure precautions  - Consider fall precautions  - Consider aspiration precautions  - Consider bleeding precautions  Outcome: Progressing     Problem:  Activity Intolerance/Impaired Mobility  Goal: Mobility/activity is maintained at optimum level for patient  Description: Interventions:  - Assess and monitor patient  barriers to mobility and need for assistive/adaptive devices  - Assess patient's emotional response to limitations  - Collaborate with interdisciplinary team and initiate plans and interventions as ordered  - Encourage independent activity per ability   - Maintain proper body alignment  - Perform active/passive rom as tolerated/ordered  - Plan activities to conserve energy   - Turn patient as appropriate  Outcome: Progressing     Problem: Communication Impairment  Goal: Ability to express needs and understand communication  Description: Assess patient's communication skills and ability to understand information  Patient will demonstrate use of effective communication techniques, alternative methods of communication and understanding even if not able to speak  - Encourage communication and provide alternate methods of communication as needed  - Collaborate with case management/ for discharge needs  - Include patient/family/caregiver in decisions related to communication  Outcome: Progressing     Problem: Potential for Aspiration  Goal: Ventilated patient's risk of aspiration is minimized  Description: Assess and monitor vital signs, respiratory status, airway cuff pressure, and labs (WBC)  Monitor for signs of aspiration (tachypnea, cough, rales, wheezing, cyanosis, fever)  - Elevate head of bed 30 degrees if patient has tube feeding   - Monitor tube feeding  Outcome: Progressing     Problem: Nutrition  Goal: Nutrition/Hydration status is improving  Description: Monitor and assess patient's nutrition/hydration status for malnutrition (ex- brittle hair, bruises, dry skin, pale skin and conjunctiva, muscle wasting, smooth red tongue, and disorientation)  Collaborate with interdisciplinary team and initiate plan and interventions as ordered  Monitor patient's weight and dietary intake as ordered or per policy   Utilize nutrition screening tool and intervene per policy  Determine patient's food preferences and provide high-protein, high-caloric foods as appropriate  - Assist patient with eating   - Allow adequate time for meals   - Encourage patient to take dietary supplement as ordered  - Collaborate with clinical nutritionist   - Include patient/family/caregiver in decisions related to nutrition    Outcome: Progressing
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
## 2022-09-01 NOTE — ASSESSMENT & PLAN NOTE
MRI cervical spine wo contrast 8/29: Multilevel degenerative disc disease and diffuse disc osteophyte complexes with superimposed disc protrusions at the C3-4 through the C6-7 levels causing cord impingement at the C3-4 through the C4-5 levels   There is no cord signal abnormality to suggest myelomalacia or edema        Plan:  · Comfort care measures
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
## 2022-09-01 NOTE — ASSESSMENT & PLAN NOTE
Lab Results   Component Value Date    HGBA1C 9 8 (H) 08/27/2022       Recent Labs     09/10/22  0549 09/10/22  0754 09/10/22  0959 09/10/22  1156   POCGLU 160* 129 213* 173*       Blood Sugar Average: Last 72 hrs:  (P) 232 65     Plan:  · Pleasure feeds  · Comfort care measures only

## 2022-09-01 NOTE — PROGRESS NOTES
Daily Progress Note - Critical Care   Violeta Joseph 57 y o  male MRN: [id number]  Unit/Bed#: ICU 01 Encounter: [id number]        ----------------------------------------------------------------------------------------  HPI/24hr events: No acute overnight events  Off cardine  Received prn labetalol x 1      ---------------------------------------------------------------------------------------  SUBJECTIVE  Headache     Review of Systems  Review of systems was reviewed and negative unless stated above in HPI/24-hour events   ---------------------------------------------------------------------------------------  Assessment and Plan:    /68, HR 76, MAP 87, RR 19, T 99, SpO2 94 %    Neuro:  · Diagnosis: Right thalamic intracranial hemorrhage with IV extension, acute encephalopathy  Increasing tone in LUE and LLE   ? FRENCH LVEF 26%, normal systolic function, no PFO, no thrombus  ? Likely due to hypertension, no embolic source identified  ? Plan:   § Thrombosis panel pending  § Hold AC/AP  § CTH 2 weeks from stroke day, if stable okay to start ASA  § Outpatient loop recorder as per neuro   § SBP < 140  § Exam stable  § Transfer to SOD  · Diagnosis: Headache   ? Plan:   § Tylenol 650 mg q 6 H   § fioricet prn   · Diagnosis: Moderate cervical spinal stenosis at C3-C4  ? Plan:   § MRI C spine without any signal abnormalities   § No acute intervention at this time   ·  Depression   · Start Celexa 10 mg    CV:   · Diagnosis: Hypertension  ? Plan:   § SBP goal 110-140  § Amlodipine 10 mg   § lisinopril 40 mg  § Coreg 12 5 mg bid (May increase to 25 if necessary   § Chlorthalidone 25 mg   § IV Hydralazine 10 mg Q 4 PRN for SBP>140  § IV Labetalol 20 mg Q 4H PRN for SBP >140, HR >50  · Diagnosis: Hyperlipidemia  ?  Plan:   § Lipitor 40 mg      Pulm:  ? Plan: Pulmonary hygiene         GI:   · Diagnosis: Dysphagia   ? Plan:   § Pureed diet  § Will need repeat VBS in the future  § Bowel regimen      :   · Diagnosis: Acute urinary retention   ? Plan:  § Resolved  § Voiding urine- 633 ml over the last 24 H  § Encourage PO fluids     F/E/N:   · Plan:   ? Replete electrolytes    ? ical 0 87-> calcium gluconate 2 g         Heme/Onc:   · Diagnosis: DVT prophylaxis   ? Plan:   § Continue to hold given hemorrhage         Endo:   · Diagnosis: New onset Diabetes  ? Plan:   § Last fingerstick 125  § SSI  § Finger sticks before meals and at bed time         ID:   · Diagnosis: Fever  ? Plan:   § No fevers        MSK/Skin:   ? Plan:   § Frequent reposition   § PT/OT/PMR        Disposition: Transfer to Med-Surg   Code Status: Level 1 - Full Code  ---------------------------------------------------------------------------------------  ICU CORE MEASURES    Prophylaxis   VTE Pharmacologic Prophylaxis: Pharmacologic VTE Prophylaxis contraindicated due to hemorrhage  VTE Mechanical Prophylaxis: sequential compression device  Stress Ulcer Prophylaxis:      ABCDE Protocol (if indicated)  Plan to perform spontaneous awakening trial today? Not applicable  Plan to perform spontaneous breathing trial today? Not applicable  Obvious barriers to extubation? Not applicable  CAM-ICU: Negative    Invasive Devices Review  Invasive Devices  Report    Peripheral Intravenous Line  Duration           Peripheral IV 22 Left Antecubital 2 days    Peripheral IV 22 Right Forearm 2 days              Can any invasive devices be discontinued today?  Not applicable  ---------------------------------------------------------------------------------------  OBJECTIVE    Physical Exam    Vitals   Vitals:    22 0400   BP:   132/66 135/68   BP Location:    Right arm   Pulse: 80 80 76 76   Resp: 16 16 17 19   Temp:    99 °F (37 2 °C)   TempSrc:    Oral   SpO2: 94% 94% 94% 94%   Weight:       Height:         Temp (24hrs), Av 7 °F (37 1 °C), Min:98 2 °F (36 8 °C), Max:99 3 °F (37 4 °C)  Current: Temperature: 99 °F (37 2 °C)      Respiratory:  SpO2: SpO2: 94 %       Invasive/non-invasive ventilation settings   Respiratory  Report   Lab Data (Last 4 hours)    None         O2/Vent Data (Last 4 hours)    None                Height and Weights   Height: 5' 4" (162 6 cm)  IBW (Ideal Body Weight): 59 2 kg  Body mass index is 31 07 kg/m²  Weight (last 2 days)     Date/Time Weight    08/30/22 1040 82 1 (181)          Intake and Output  I/O       08/30 0701  08/31 0700 08/31 0701 09/01 0700 09/01 0701  09/02 0700    P  O  50 200     I V  (mL/kg) 538 2 (6 6) 158 1 (1 9)     IV Piggyback       Feedings       Total Intake(mL/kg) 588 2 (7 2) 358 1 (4 4)     Urine (mL/kg/hr) 637 (0 3) 633 (0 3)     Stool       Total Output 637 633     Net -48 8 -274 9                UOP: 637    Nutrition       Diet Orders   (From admission, onward)             Start     Ordered    08/31/22 1050  Diet Dysphagia/Modified Consistency; Dysphagia 1-Pureed; Nectar Thick Liquid; Consistent Carbohydrate Diet Level 3 (6 carb servings/90 grams CHO/meal)  Diet effective now        References:    Nutrtion Support Algorithm Enteral vs  Parenteral   Question Answer Comment   Diet Type Dysphagia/Modified Consistency    Dysphagia/Modified Consistency Dysphagia 1-Pureed    Liquid Modifier Nectar Thick Liquid    Other Restriction(s): Consistent Carbohydrate Diet Level 3 (6 carb servings/90 grams CHO/meal)    RD to adjust diet per protocol?  Yes        08/31/22 1049    08/31/22 1050  Dietary nutrition supplements  Once        Question Answer Comment   Select Supplement: Ensure Pudding-Chocolate    Frequency Lunch        08/31/22 1049    08/31/22 1050  Dietary nutrition supplements  Once        Question Answer Comment   Select Supplement: Ensure Pudding-Vanilla    Frequency Breakfast, Lunch        08/31/22 1049                  Laboratory and Diagnostics:  Results from last 7 days   Lab Units 09/01/22  0600 08/31/22  0528 08/30/22  1760 08/29/22  0531 08/28/22  0896 08/27/22  6510 08/26/22  1043 08/26/22  1042   WBC Thousand/uL 10 10 8 99 8 34 7 67 12 93* 12 83*  --  6 77   HEMOGLOBIN g/dL 15 4 15 1 14 4 13 5 15 0 15 0  --  17 5*   I STAT HEMOGLOBIN g/dl  --   --   --   --   --   --  17 0  --    HEMATOCRIT % 45 5 46 5 42 7 40 3 44 5 43 7  --  49 7*   HEMATOCRIT, ISTAT %  --   --   --   --   --   --  50*  --    PLATELETS Thousands/uL 297 268 217 200 268 274  --  288   NEUTROS PCT %  --   --   --   --   --  92*  --   --    MONOS PCT %  --   --   --   --   --  3*  --   --      Results from last 7 days   Lab Units 09/01/22 0600 08/31/22 0528 08/30/22  0514 08/29/22  0531 08/28/22  0807 08/27/22  1728 08/27/22  0512   SODIUM mmol/L 141 138 142 142 140 143 142   POTASSIUM mmol/L 4 0 4 1 3 8 3 6 5 1 4 3 4 0   CHLORIDE mmol/L 110* 112* 109* 109* 111* 114* 112*   CO2 mmol/L 24 21 28 25 24 25 26   ANION GAP mmol/L 7 5 5 8 5 4 4   BUN mg/dL 30* 25 24 24 20 28* 31*   CREATININE mg/dL 0 84 0 86 0 90 1 09 1 01 1 16 1 53*   CALCIUM mg/dL 8 1* 7 9* 7 8* 8 3 8 1* 8 1* 8 8   GLUCOSE RANDOM mg/dL 119 150* 128 314* 212* 164* 235*   ALT U/L  --   --   --   --   --   --  40   AST U/L  --   --   --   --   --   --  12   ALK PHOS U/L  --   --   --   --   --   --  75   ALBUMIN g/dL  --   --   --   --   --   --  3 3*   TOTAL BILIRUBIN mg/dL  --   --   --   --   --   --  2 00*     Results from last 7 days   Lab Units 09/01/22 0600 08/31/22 0528 08/29/22  0531 08/28/22  0807 08/27/22  0512   MAGNESIUM mg/dL 2 8* 2 7* 2 5 2 3 2 1   PHOSPHORUS mg/dL 3 4 3 1 4 6* 3 3 2 3      Results from last 7 days   Lab Units 08/26/22  1042   INR  1 04   PTT seconds 24              ABG:    VBG:    Results from last 7 days   Lab Units 08/30/22  0514 08/28/22  1737   PROCALCITONIN ng/ml 0 12 0 16       Micro  Results from last 7 days   Lab Units 08/28/22  1748 08/28/22  1737   BLOOD CULTURE  No Growth at 72 hrs  No Growth at 72 hrs  EKG: N/A  Imaging:  I have personally reviewed pertinent reports        Active Medications  Scheduled Meds:  Current Facility-Administered Medications   Medication Dose Route Frequency Provider Last Rate    acetaminophen  650 mg Oral Q6H PRN Deepti Fields MD      amLODIPine  10 mg Oral Daily Ольга Madden MD      atorvastatin  40 mg Oral After [employer], DO      bisacodyl  10 mg Rectal Daily PRN Ольга Madden MD      butalbital-acetaminophen-caffeine  1 tablet Oral Q6H PRN Mis Pound, DO      carvedilol  12 5 mg Oral BID With Meals Marquita Pickard MD      chlorhexidine  15 mL Mouth/Throat Q12H [facility] Mary Luke MD      chlorthalidone  25 mg Oral Daily Mischepe Rodriguez, [place]      hydrALAZINE  10 mg Intravenous Q4H PRN Ольга Madden MD      insulin glargine  20 Units Subcutaneous MUKUND Looney MD      insulin lispro  2-12 Units Subcutaneous 4x Daily (with meals and at bedtime) Deepti Fields MD      labetalol  20 mg Intravenous K5J PRN Marquita Pickard MD      lisinopril  40 mg Oral Daily Deepti Fields MD      niCARdipine  1-15 mg/hr Intravenous Titrated Deepti Fields MD Stopped (08/31/22 1031)    ondansetron  4 mg Intravenous Q6H PRN Ольга Madden MD      oxyCODONE  5 mg Oral Q4H PRN Mary Luke MD      oxyCODONE  7 5 mg Oral Q4H PRN Enoch Sandoval MD      polyethylene glycol  17 g Oral Daily Ольга Madden MD      senna-docusate sodium  2 tablet Oral BID Ольга Madden MD       Continuous Infusions:  niCARdipine, 1-15 mg/hr, Last Rate: Stopped (08/31/22 1031)      PRN Meds:   acetaminophen, 650 mg, Q6H PRN  bisacodyl, 10 mg, Daily PRN  butalbital-acetaminophen-caffeine, 1 tablet, Q6H PRN  hydrALAZINE, 10 mg, Q4H PRN  labetalol, 20 mg, Q4H PRN  ondansetron, 4 mg, Q6H PRN  oxyCODONE, 5 mg, Q4H PRN  oxyCODONE, 7 5 mg, Q4H PRN        Allergies   Not on File    Advance Directive and Living Will:      Power of :    POLST:      Counseling / Coordination of Care  Total Critical Care time spent 30  minutes excluding procedures, teaching and family updates  Lizbet Gama MD      Portions of the record may have been created with voice recognition software  Occasional wrong word or "sound a like" substitutions may have occurred due to the inherent limitations of voice recognition software    Read the chart carefully and recognize, using context, where substitutions have occurred
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
## 2022-09-02 NOTE — RESTORATIVE TECHNICIAN NOTE
Restorative Technician Note      Patient Name: Lc Estrella     Note Type: Mobility  Patient Position Upon Consult: Supine  Activity Performed: Dangled; Transferred; Stood; Range of motion; Repositioned  Assistive Device: Other (Comment) (Assist x2 to sit EOB/stand x2 and stand-pivot to the chair )  Range of Motion: All extremities  Education Provided: Yes  Patient Position at End of Consult: Bedside chair;  All needs within reach; Bed/Chair alarm activated  Danny BAUER, Restorative Technician, [employer]

## 2022-09-02 NOTE — PLAN OF CARE
Problem: MOBILITY - ADULT  Goal: Maintain or return to baseline ADL function  Description: INTERVENTIONS:  -  Assess patient's ability to carry out ADLs; assess patient's baseline for ADL function and identify physical deficits which impact ability to perform ADLs (bathing, care of mouth/teeth, toileting, grooming, dressing, etc )  - Assess/evaluate cause of self-care deficits   - Assess range of motion  - Assess patient's mobility; develop plan if impaired  - Assess patient's need for assistive devices and provide as appropriate  - Encourage maximum independence but intervene and supervise when necessary  - Involve family in performance of ADLs  - Assess for home care needs following discharge   - Consider OT consult to assist with ADL evaluation and planning for discharge  - Provide patient education as appropriate  Reposition every 2 hours   Outcome: Progressing  Goal: Maintains/Returns to pre admission functional level  Description: INTERVENTIONS:  - Perform BMAT or MOVE assessment daily    - Set and communicate daily mobility goal to care team and patient/family/caregiver     - Collaborate with rehabilitation services on mobility goals if consulted    - Out of bed for toileting  - Record patient progress and toleration of activity level   Outcome: Progressing     Problem: PAIN - ADULT  Goal: Verbalizes/displays adequate comfort level or baseline comfort level  Description: Interventions:  - Encourage patient to monitor pain and request assistance  - Assess pain using appropriate pain scale  - Administer analgesics based on type and severity of pain and evaluate response  - Implement non-pharmacological measures as appropriate and evaluate response  - Consider cultural and social influences on pain and pain management  - Notify physician/advanced practitioner if interventions unsuccessful or patient reports new pain  Outcome: Progressing     Problem: INFECTION - ADULT  Goal: Absence or prevention of progression during hospitalization  Description: INTERVENTIONS:  - Assess and monitor for signs and symptoms of infection  - Monitor lab/diagnostic results  - Monitor all insertion sites, i e  indwelling lines, tubes, and drains  - Monitor endotracheal if appropriate and nasal secretions for changes in amount and color  - [place] appropriate cooling/warming therapies per order  - Administer medications as ordered  - Instruct and encourage patient and family to use good hand hygiene technique  - Identify and instruct in appropriate isolation precautions for identified infection/condition  Outcome: Progressing  Goal: Absence of fever/infection during neutropenic period  Description: INTERVENTIONS:  - Monitor WBC    Outcome: Progressing     Problem: SAFETY ADULT  Goal: Maintain or return to baseline ADL function  Description: INTERVENTIONS:  -  Assess patient's ability to carry out ADLs; assess patient's baseline for ADL function and identify physical deficits which impact ability to perform ADLs (bathing, care of mouth/teeth, toileting, grooming, dressing, etc )  - Assess/evaluate cause of self-care deficits   - Assess range of motion  - Assess patient's mobility; develop plan if impaired  - Assess patient's need for assistive devices and provide as appropriate  - Encourage maximum independence but intervene and supervise when necessary  - Involve family in performance of ADLs  - Assess for home care needs following discharge   - Consider OT consult to assist with ADL evaluation and planning for discharge  - Provide patient education as appropriate  Outcome: Progressing  Goal: Maintains/Returns to pre admission functional level  Description: INTERVENTIONS:  - Perform BMAT or MOVE assessment daily    - Set and communicate daily mobility goal to care team and patient/family/caregiver     - Collaborate with rehabilitation services on mobility goals if consulted    - Ambulate patient 3 times a day    - Out of bed for toileting  - Record patient progress and toleration of activity level   Outcome: Progressing  Goal: Patient will remain free of falls  Description: INTERVENTIONS:  - Educate patient/family on patient safety including physical limitations  - Instruct patient to call for assistance with activity   - Consult OT/PT to assist with strengthening/mobility   - Keep Call bell within reach  - Keep bed low and locked with side rails adjusted as appropriate  - Keep care items and personal belongings within reach  - Initiate and maintain comfort rounds    - Apply yellow socks and bracelet for high fall risk patients  - Consider moving patient to room near nurses station  Outcome: Progressing     Problem: DISCHARGE PLANNING  Goal: Discharge to home or other facility with appropriate resources  Description: INTERVENTIONS:  - Identify barriers to discharge w/patient and caregiver  - Arrange for needed discharge resources and transportation as appropriate  - Identify discharge learning needs (meds, wound care, etc )  - Arrange for interpretive services to assist at discharge as needed  - Refer to Case Management Department for coordinating discharge planning if the patient needs post-hospital services based on physician/advanced practitioner order or complex needs related to functional status, cognitive ability, or social support system  Outcome: Progressing     Problem: Knowledge Deficit  Goal: Patient/family/caregiver demonstrates understanding of disease process, treatment plan, medications, and discharge instructions  Description: Complete learning assessment and assess knowledge base    Interventions:  - Provide teaching at level of understanding  - Provide teaching via preferred learning methods  Outcome: Progressing     Problem: Nutrition/Hydration-ADULT  Goal: Nutrient/Hydration intake appropriate for improving, restoring or maintaining nutritional needs  Description: Monitor and assess patient's nutrition/hydration status for malnutrition  Collaborate with interdisciplinary team and initiate plan and interventions as ordered  Monitor patient's weight and dietary intake as ordered or per policy  Utilize nutrition screening tool and intervene as necessary  Determine patient's food preferences and provide high-protein, high-caloric foods as appropriate       INTERVENTIONS:  - Monitor oral intake, urinary output, labs, and treatment plans  - Assess nutrition and hydration status and recommend course of action  - Evaluate amount of meals eaten  - Assist patient with eating if necessary   - Allow adequate time for meals  - Recommend/ encourage appropriate diets, oral nutritional supplements, and vitamin/mineral supplements  - Order, calculate, and assess calorie counts as needed  - Recommend, monitor, and adjust tube feedings and TPN/PPN based on assessed needs  - Assess need for intravenous fluids  - Provide specific nutrition/hydration education as appropriate  - Include patient/family/caregiver in decisions related to nutrition  Outcome: Progressing     Problem: Prexisting or High Potential for Compromised Skin Integrity  Goal: Skin integrity is maintained or improved  Description: INTERVENTIONS:  - Identify patients at risk for skin breakdown  - Assess and monitor skin integrity  - Assess and monitor nutrition and hydration status  - Monitor labs   - Assess for incontinence   - Turn and reposition patient  - Assist with mobility/ambulation  - Relieve pressure over bony prominences  - Avoid friction and shearing  - Provide appropriate hygiene as needed including keeping skin clean and dry  - Evaluate need for skin moisturizer/barrier cream  - Collaborate with interdisciplinary team   - Patient/family teaching  - Consider wound care consult   Outcome: Progressing     Problem: Potential for Falls  Goal: Patient will remain free of falls  Description: INTERVENTIONS:  - Educate patient/family on patient safety including physical limitations  - Instruct patient to call for assistance with activity   - Consult OT/PT to assist with strengthening/mobility   - Keep Call bell within reach  - Keep bed low and locked with side rails adjusted as appropriate  - Keep care items and personal belongings within reach  - Initiate and maintain comfort rounds  - Make Fall Risk Sign visible to staff  - Offer Toileting every 2 Hours, in advance of need  - Initiate/Maintain bed alarm  - Obtain necessary fall risk management equipment:   - Apply yellow socks and bracelet for high fall risk patients  - Consider moving patient to room near nurses station  Outcome: Progressing     Problem: Neurological Deficit  Goal: Neurological status is stable or improving  Description: Interventions:  - Monitor and assess patient's level of consciousness, motor function, sensory function, and level of assistance needed for ADLs  - Monitor and report changes from baseline  Collaborate with interdisciplinary team to initiate plan and implement interventions as ordered  - Provide and maintain a safe environment  - Consider seizure precautions  - Consider fall precautions  - Consider aspiration precautions  - Consider bleeding precautions  Outcome: Progressing     Problem: Activity Intolerance/Impaired Mobility  Goal: Mobility/activity is maintained at optimum level for patient  Description: Interventions:  - Assess and monitor patient  barriers to mobility and need for assistive/adaptive devices  - Assess patient's emotional response to limitations  - Collaborate with interdisciplinary team and initiate plans and interventions as ordered  - Encourage independent activity per ability   - Maintain proper body alignment  - Perform active/passive rom as tolerated/ordered    - Plan activities to conserve energy   - Turn patient as appropriate  Outcome: Progressing     Problem: Communication Impairment  Goal: Ability to express needs and understand communication  Description: Assess patient's communication skills and ability to understand information  Patient will demonstrate use of effective communication techniques, alternative methods of communication and understanding even if not able to speak  - Encourage communication and provide alternate methods of communication as needed  - Collaborate with case management/ for discharge needs  - Include patient/family/caregiver in decisions related to communication  Outcome: Progressing     Problem: Potential for Aspiration  Goal: Ventilated patient's risk of aspiration is minimized  Description: Assess and monitor vital signs, respiratory status, airway cuff pressure, and labs (WBC)  Monitor for signs of aspiration (tachypnea, cough, rales, wheezing, cyanosis, fever)  - Elevate head of bed 30 degrees if patient has tube feeding   - Monitor tube feeding  Outcome: Progressing     Problem: Nutrition  Goal: Nutrition/Hydration status is improving  Description: Monitor and assess patient's nutrition/hydration status for malnutrition (ex- brittle hair, bruises, dry skin, pale skin and conjunctiva, muscle wasting, smooth red tongue, and disorientation)  Collaborate with interdisciplinary team and initiate plan and interventions as ordered  Monitor patient's weight and dietary intake as ordered or per policy  Utilize nutrition screening tool and intervene per policy  Determine patient's food preferences and provide high-protein, high-caloric foods as appropriate  - Assist patient with eating   - Allow adequate time for meals   - Encourage patient to take dietary supplement as ordered  - Collaborate with clinical nutritionist   - Include patient/family/caregiver in decisions related to nutrition    Outcome: Progressing

## 2022-09-02 NOTE — RESTORATIVE TECHNICIAN NOTE
Restorative Technician Note      Patient Name: Niya Pompa     Note Type: Mobility  Patient Position Upon Consult: Bedside chair  Activity Performed: Dangled; Stood; Transferred  Assistive Device: Other (Comment) (Assist x2 stand-pivot back to bed  Sat EOB/did exercises  Assisted PT Viagreer Tong )  Range of Motion: All extremities  Education Provided: Yes  Patient Position at End of Consult: Supine;  All needs within reach; Bed/Chair alarm activated    Radha BAUER, Restorative Technician, [employer]

## 2022-09-02 NOTE — PLAN OF CARE
Problem: OCCUPATIONAL THERAPY ADULT  Goal: Performs self-care activities at highest level of function for planned discharge setting  See evaluation for individualized goals  Description: Treatment Interventions: ADL retraining, Visual perceptual retraining, Functional transfer training, UE strengthening/ROM, Endurance training, Cognitive reorientation, Patient/family training, Equipment evaluation/education, Fine motor coordination activities, Neuromuscular reeducation, Compensatory technique education, Continued evaluation, Energy conservation, Activityengagement          See flowsheet documentation for full assessment, interventions and recommendations  Outcome: Progressing  Note: Limitation: Decreased ADL status, Decreased UE strength, Decreased UE ROM, Decreased Safe judgement during ADL, Decreased cognition, Decreased endurance, Decreased sensation, Visual deficit, Decreased fine motor control, Decreased self-care trans, Decreased high-level ADLs, Non-func L UE  Prognosis: Fair  Assessment: Pt wife Leilani Mancilla present throughout session and reported that pt likes to be called "Ray"  Pt was non-verbal this session with the exception of stating his wife's name and gave a pain number for his HA 8/10  Pt seen for participation in Occupational Therapy session with focus on activity tolerance, bed mob, functional transfers/stand pivot transfers and sitting balance and tolerance for pt engagement in UB self-care tasks/self-feeding  Pt cleared by RN/ Julissa Hopper for pt participated in OT session  Pt presented supine/HOB raised pt awake/alert and pt identifiers confirmed  Pt was unable to report his therapy goal 2* pt cognitive impairments  He was able to nod head yes during session and held his head often with his R hand  Pt required assist x 2 for functional transfers 2* decreased strength and activity tolerance    He was able to compete plate to mouth scoop with use of built up utensil after OT set up and instruction  Pt will require post acute rehab service to continue to address these above noted pt deficit which currently impair pt ADL and functional mob  Pt set up to bedside chair post session, chair alarm activated and all needs within reach    Recommendation: (S) Physiatry Consult  OT Discharge Recommendation: Post acute rehabilitation services
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
## 2022-09-02 NOTE — PLAN OF CARE
Problem: MOBILITY - ADULT  Goal: Maintain or return to baseline ADL function  Description: INTERVENTIONS:  -  Assess patient's ability to carry out ADLs; assess patient's baseline for ADL function and identify physical deficits which impact ability to perform ADLs (bathing, care of mouth/teeth, toileting, grooming, dressing, etc )  - Assess/evaluate cause of self-care deficits   - Assess range of motion  - Assess patient's mobility; develop plan if impaired  - Assess patient's need for assistive devices and provide as appropriate  - Encourage maximum independence but intervene and supervise when necessary  - Involve family in performance of ADLs  - Assess for home care needs following discharge   - Consider OT consult to assist with ADL evaluation and planning for discharge  - Provide patient education as appropriate  Outcome: Progressing  Goal: Maintains/Returns to pre admission functional level  Description: INTERVENTIONS:  - Perform BMAT or MOVE assessment daily    - Set and communicate daily mobility goal to care team and patient/family/caregiver     - Collaborate with rehabilitation services on mobility goals if consulted  - Out of bed for toileting  - Record patient progress and toleration of activity level   Outcome: Progressing     Problem: PAIN - ADULT  Goal: Verbalizes/displays adequate comfort level or baseline comfort level  Description: Interventions:  - Encourage patient to monitor pain and request assistance  - Assess pain using appropriate pain scale  - Administer analgesics based on type and severity of pain and evaluate response  - Implement non-pharmacological measures as appropriate and evaluate response  - Consider cultural and social influences on pain and pain management  - Notify physician/advanced practitioner if interventions unsuccessful or patient reports new pain  Outcome: Progressing     Problem: INFECTION - ADULT  Goal: Absence or prevention of progression during hospitalization  Description: INTERVENTIONS:  - Assess and monitor for signs and symptoms of infection  - Monitor lab/diagnostic results  - Monitor all insertion sites, i e  indwelling lines, tubes, and drains  - Monitor endotracheal if appropriate and nasal secretions for changes in amount and color  - [place] appropriate cooling/warming therapies per order  - Administer medications as ordered  - Instruct and encourage patient and family to use good hand hygiene technique  - Identify and instruct in appropriate isolation precautions for identified infection/condition  Outcome: Progressing  Goal: Absence of fever/infection during neutropenic period  Description: INTERVENTIONS:  - Monitor WBC    Outcome: Progressing     Problem: SAFETY ADULT  Goal: Maintain or return to baseline ADL function  Description: INTERVENTIONS:  -  Assess patient's ability to carry out ADLs; assess patient's baseline for ADL function and identify physical deficits which impact ability to perform ADLs (bathing, care of mouth/teeth, toileting, grooming, dressing, etc )  - Assess/evaluate cause of self-care deficits   - Assess range of motion  - Assess patient's mobility; develop plan if impaired  - Assess patient's need for assistive devices and provide as appropriate  - Encourage maximum independence but intervene and supervise when necessary  - Involve family in performance of ADLs  - Assess for home care needs following discharge   - Consider OT consult to assist with ADL evaluation and planning for discharge  - Provide patient education as appropriate  Outcome: Progressing  Goal: Maintains/Returns to pre admission functional level  Description: INTERVENTIONS:  - Perform BMAT or MOVE assessment daily    - Set and communicate daily mobility goal to care team and patient/family/caregiver     - Collaborate with rehabilitation services on mobility goals if consulted  - Out of bed for toileting  - Record patient progress and toleration of activity level   Outcome: Progressing  Goal: Patient will remain free of falls  Description: INTERVENTIONS:  - Educate patient/family on patient safety including physical limitations  - Instruct patient to call for assistance with activity   - Consult OT/PT to assist with strengthening/mobility   - Keep Call bell within reach  - Keep bed low and locked with side rails adjusted as appropriate  - Keep care items and personal belongings within reach  - Initiate and maintain comfort rounds  - Make Fall Risk Sign visible to staff  - Apply yellow socks and bracelet for high fall risk patients  - Consider moving patient to room near nurses station  Outcome: Progressing     Problem: Knowledge Deficit  Goal: Patient/family/caregiver demonstrates understanding of disease process, treatment plan, medications, and discharge instructions  Description: Complete learning assessment and assess knowledge base    Interventions:  - Provide teaching at level of understanding  - Provide teaching via preferred learning methods  Outcome: Progressing     Problem: Prexisting or High Potential for Compromised Skin Integrity  Goal: Skin integrity is maintained or improved  Description: INTERVENTIONS:  - Identify patients at risk for skin breakdown  - Assess and monitor skin integrity  - Assess and monitor nutrition and hydration status  - Monitor labs   - Assess for incontinence   - Turn and reposition patient  - Assist with mobility/ambulation  - Relieve pressure over bony prominences  - Avoid friction and shearing  - Provide appropriate hygiene as needed including keeping skin clean and dry  - Evaluate need for skin moisturizer/barrier cream  - Collaborate with interdisciplinary team   - Patient/family teaching  - Consider wound care consult   Outcome: Progressing     Problem: Nutrition/Hydration-ADULT  Goal: Nutrient/Hydration intake appropriate for improving, restoring or maintaining nutritional needs  Description: Monitor and assess patient's nutrition/hydration status for malnutrition  Collaborate with interdisciplinary team and initiate plan and interventions as ordered  Monitor patient's weight and dietary intake as ordered or per policy  Utilize nutrition screening tool and intervene as necessary  Determine patient's food preferences and provide high-protein, high-caloric foods as appropriate       INTERVENTIONS:  - Monitor oral intake, urinary output, labs, and treatment plans  - Assess nutrition and hydration status and recommend course of action  - Evaluate amount of meals eaten  - Assist patient with eating if necessary   - Allow adequate time for meals  - Recommend/ encourage appropriate diets, oral nutritional supplements, and vitamin/mineral supplements  - Order, calculate, and assess calorie counts as needed  - Recommend, monitor, and adjust tube feedings and TPN/PPN based on assessed needs  - Assess need for intravenous fluids  - Provide specific nutrition/hydration education as appropriate  - Include patient/family/caregiver in decisions related to nutrition  Outcome: Progressing     Problem: Potential for Falls  Goal: Patient will remain free of falls  Description: INTERVENTIONS:  - Educate patient/family on patient safety including physical limitations  - Instruct patient to call for assistance with activity   - Consult OT/PT to assist with strengthening/mobility   - Keep Call bell within reach  - Keep bed low and locked with side rails adjusted as appropriate  - Keep care items and personal belongings within reach  - Initiate and maintain comfort rounds  - Make Fall Risk Sign visible to staff  - Apply yellow socks and bracelet for high fall risk patients  - Consider moving patient to room near nurses station  Outcome: Progressing     Problem: Neurological Deficit  Goal: Neurological status is stable or improving  Description: Interventions:  - Monitor and assess patient's level of consciousness, motor function, sensory function, and level of assistance needed for ADLs  - Monitor and report changes from baseline  Collaborate with interdisciplinary team to initiate plan and implement interventions as ordered  - Provide and maintain a safe environment  - Consider seizure precautions  - Consider fall precautions  - Consider aspiration precautions  - Consider bleeding precautions  Outcome: Progressing     Problem: Potential for Aspiration  Goal: Ventilated patient's risk of aspiration is minimized  Description: Assess and monitor vital signs, respiratory status, airway cuff pressure, and labs (WBC)  Monitor for signs of aspiration (tachypnea, cough, rales, wheezing, cyanosis, fever)  - Elevate head of bed 30 degrees if patient has tube feeding   - Monitor tube feeding  Outcome: Progressing     Problem: Communication Impairment  Goal: Ability to express needs and understand communication  Description: Assess patient's communication skills and ability to understand information  Patient will demonstrate use of effective communication techniques, alternative methods of communication and understanding even if not able to speak  - Encourage communication and provide alternate methods of communication as needed  - Collaborate with case management/ for discharge needs  - Include patient/family/caregiver in decisions related to communication  Outcome: Progressing     Problem: Nutrition  Goal: Nutrition/Hydration status is improving  Description: Monitor and assess patient's nutrition/hydration status for malnutrition (ex- brittle hair, bruises, dry skin, pale skin and conjunctiva, muscle wasting, smooth red tongue, and disorientation)  Collaborate with interdisciplinary team and initiate plan and interventions as ordered  Monitor patient's weight and dietary intake as ordered or per policy  Utilize nutrition screening tool and intervene per policy   Determine patient's food preferences and provide high-protein, high-caloric foods as appropriate  - Assist patient with eating   - Allow adequate time for meals   - Encourage patient to take dietary supplement as ordered  - Collaborate with clinical nutritionist   - Include patient/family/caregiver in decisions related to nutrition    Outcome: Progressing

## 2022-09-02 NOTE — PLAN OF CARE
Problem: PHYSICAL THERAPY ADULT  Goal: Performs mobility at highest level of function for planned discharge setting  See evaluation for individualized goals  Description: Treatment/Interventions: ADL retraining, Functional transfer training, LE strengthening/ROM, Elevations, Therapeutic exercise, Endurance training, Cognitive reorientation, Patient/family training, Equipment eval/education, Bed mobility, Gait training, Spoke to nursing, OT, Family, Compensatory technique education, Continued evaluation, Spoke to MD  Equipment Recommended:  (TBD)       See flowsheet documentation for full assessment, interventions and recommendations  Outcome: Progressing  Note: Prognosis: Fair  Problem List: Decreased strength, Decreased endurance, Impaired balance, Decreased mobility, Decreased cognition, Decreased coordination, Impaired judgement, Decreased safety awareness, Pain  Assessment: Pt agreeable to participate in PT session  Pt performed functional mobility and therex as outlined above  Limited by fatigue and weakness throughout session  L neglect noted and time spent with cues to attend to L side  Reaching task with RUE while EOB  Reaching with RUE and crossing midline to L to promote L sided attention and promote trunk strength  VC/TC for midline orientation and trunks stability as well as neck extension for neutral positioning  Pt left supine in bed with b/l railings up, call bell, phone, and all personal needs within reach  RN made aware of faulty bed alarm  Pt will continue to benefit from skilled acute care PT to further address their functional mobility limitations  D/C recommendations remain rehab  Barriers to Discharge: Inaccessible home environment, Decreased caregiver support     PT Discharge Recommendation: Post acute rehabilitation services    See flowsheet documentation for full assessment

## 2022-09-02 NOTE — OCCUPATIONAL THERAPY NOTE
Occupational Therapy Treatment Note     09/02/22 1302   OT Last Visit   OT Visit Date 09/02/22   Note Type   Note Type Treatment   Restrictions/Precautions   Weight Bearing Precautions Per Order No   Braces or Orthoses   (none)   Other Precautions Chair Alarm   Lifestyle   Autonomy I w/ ADLS/IADLS, transfers and functional mobility PTA   Reciprocal Relationships Pt lives w/ his spouse; spouse works from home   Service to Others Retired   Intrinsic Gratification Likes to play video games   Pain Assessment   Pain Assessment Tool 0-10   Pain Score 8   Pain Location/Orientation Location: Head   ADL   Where Assessed Chair   Equipment Provided Built up [address] [place] [address] 2  [address]  assist;Bringing food to mouth assist;Setup;Supervision/safety; Increased time to complete;Verbal cueing; Adaptive utensils (Comment); Beverage management;Pureed diet; Thickened liquids   Bed Mobility   Supine to Sit 2  Maximal assistance   Additional items Assist x 2   Transfers   Sit to Stand 2  Maximal assistance   Additional items Assist x 2   Stand to Sit 2  Maximal assistance   Additional items Assist x 2   Stand pivot 2  Maximal assistance   Additional items Assist x 2   Cognition   Overall Cognitive Status Impaired   Arousal/Participation Alert; Cooperative   Attention Attends with cues to redirect   Orientation Level Oriented to person   Memory Unable to assess   Following Commands Follows one step commands without difficulty   Additional Activities   Additional Activities Other (Comment)  (tabletop card activity)   Activity Tolerance   Activity Tolerance Patient limited by fatigue   Assessment   Assessment Pt wife Bob Booker present throughout session and reported that pt likes to be called "Ray"  Pt was non-verbal this session with the exception of stating his wife's name and gave a pain number for his HA 8/10    Pt seen for participation in Occupational Therapy session with focus on activity tolerance, bed mob, functional transfers/stand pivot transfers and sitting balance and tolerance for pt engagement in UB self-care tasks/self-feeding  Pt cleared by RN/ Jenny Sheets for pt participated in OT session  Pt presented supine/HOB raised pt awake/alert and pt identifiers confirmed  Pt was unable to report his therapy goal 2* pt cognitive impairments  He was able to nod head yes during session and held his head often with his R hand  Pt required assist x 2 for functional transfers 2* decreased strength and activity tolerance  He was able to compete plate to mouth scoop with use of built up utensil after OT set up and instruction  Pt will require post acute rehab service to continue to address these above noted pt deficit which currently impair pt ADL and functional mob  Pt set up to bedside chair post session, chair alarm activated and all needs within reach     Plan   Treatment Interventions ADL retraining;Functional transfer training   Goal Expiration Date 09/10/22   OT Treatment Day 2   OT Frequency 3-5x/wk   Recommendation   OT Discharge Recommendation Post acute rehabilitation services   AM-PAC Daily Activity Inpatient   Lower Body Dressing 2   Bathing 2   Toileting 2   Upper Body Dressing 2   Grooming 3   Eating 3   Daily Activity Raw Score 14   Daily Activity Standardized Score (Calc for Raw Score >=11) 33 39   AM-PAC Applied Cognition Inpatient   Following a Speech/Presentation 2   Understanding Ordinary Conversation 2   Taking Medications 1   Remembering Where Things Are Placed or Put Away 1   Remembering List of 4-5 Errands 1   Taking Care of Complicated Tasks 1   Applied Cognition Raw Score 8   Applied Cognition Standardized Score 19 32   Barthel Index   Feeding 5   Bathing 0   Grooming Score 0   Dressing Score 0   Bladder Score 0   Bowels Score 0   Toilet Use Score 5   Transfers (Bed/Chair) Score 5   Mobility (Level Surface) Score 0   Stairs Score 0   Barthel Index Score 15       Nicole Booker WOODWARD/L

## 2022-09-03 PROBLEM — D72.829 LEUKOCYTOSIS: Status: ACTIVE | Noted: 2022-01-01

## 2022-09-03 NOTE — ASSESSMENT & PLAN NOTE
Patient with mild leukocytosis of 11 4 on 09/03 with associated elevated temperature 100 4°  Temperatures trended down throughout the day today  Possibly due to hemoconcentration verses infection  WBC count has been variable over the last couple days and fever trending down  No clear sign of an infectious source at this time       09/04/2022 night - patient developed fever 101° F and tachycardia in the 110s considering aspiration pneumonia versus pneumonitis as possible etiology given patient's decreased ability to swallow  Repeated chest x-ray      Plan:  · Comfort care measures

## 2022-09-03 NOTE — PLAN OF CARE
Problem: MOBILITY - ADULT  Goal: Maintain or return to baseline ADL function  Description: INTERVENTIONS:  -  Assess patient's ability to carry out ADLs; assess patient's baseline for ADL function and identify physical deficits which impact ability to perform ADLs (bathing, care of mouth/teeth, toileting, grooming, dressing, etc )  - Assess/evaluate cause of self-care deficits   - Assess range of motion  - Assess patient's mobility; develop plan if impaired  - Assess patient's need for assistive devices and provide as appropriate  - Encourage maximum independence but intervene and supervise when necessary  - Involve family in performance of ADLs  - Assess for home care needs following discharge   - Consider OT consult to assist with ADL evaluation and planning for discharge  - Provide patient education as appropriate  Outcome: Progressing  Goal: Maintains/Returns to pre admission functional level  Description: INTERVENTIONS:  - Perform BMAT or MOVE assessment daily    - Set and communicate daily mobility goal to care team and patient/family/caregiver  - Collaborate with rehabilitation services on mobility goals if consulted  - Perform Range of Motion 3 times a day  - Reposition patient every 2 hours      - Out of bed for toileting  - Record patient progress and toleration of activity level   Outcome: Progressing     Problem: PAIN - ADULT  Goal: Verbalizes/displays adequate comfort level or baseline comfort level  Description: Interventions:  - Encourage patient to monitor pain and request assistance  - Assess pain using appropriate pain scale  - Administer analgesics based on type and severity of pain and evaluate response  - Implement non-pharmacological measures as appropriate and evaluate response  - Consider cultural and social influences on pain and pain management  - Notify physician/advanced practitioner if interventions unsuccessful or patient reports new pain  Outcome: Progressing     Problem: INFECTION - ADULT  Goal: Absence or prevention of progression during hospitalization  Description: INTERVENTIONS:  - Assess and monitor for signs and symptoms of infection  - Monitor lab/diagnostic results  - Monitor all insertion sites, i e  indwelling lines, tubes, and drains  - Monitor endotracheal if appropriate and nasal secretions for changes in amount and color  - [place] appropriate cooling/warming therapies per order  - Administer medications as ordered  - Instruct and encourage patient and family to use good hand hygiene technique  - Identify and instruct in appropriate isolation precautions for identified infection/condition  Outcome: Progressing  Goal: Absence of fever/infection during neutropenic period  Description: INTERVENTIONS:  - Monitor WBC    Outcome: Progressing     Problem: SAFETY ADULT  Goal: Maintain or return to baseline ADL function  Description: INTERVENTIONS:  -  Assess patient's ability to carry out ADLs; assess patient's baseline for ADL function and identify physical deficits which impact ability to perform ADLs (bathing, care of mouth/teeth, toileting, grooming, dressing, etc )  - Assess/evaluate cause of self-care deficits   - Assess range of motion  - Assess patient's mobility; develop plan if impaired  - Assess patient's need for assistive devices and provide as appropriate  - Encourage maximum independence but intervene and supervise when necessary  - Involve family in performance of ADLs  - Assess for home care needs following discharge   - Consider OT consult to assist with ADL evaluation and planning for discharge  - Provide patient education as appropriate  Outcome: Progressing  Goal: Maintains/Returns to pre admission functional level  Description: INTERVENTIONS:  - Perform BMAT or MOVE assessment daily    - Set and communicate daily mobility goal to care team and patient/family/caregiver     - Collaborate with rehabilitation services on mobility goals if consulted  - Perform Range of Motion 3 times a day  - Reposition patient every 2 hours  - Out of bed for toileting  - Record patient progress and toleration of activity level   Outcome: Progressing  Goal: Patient will remain free of falls  Description: INTERVENTIONS:  - Educate patient/family on patient safety including physical limitations  - Instruct patient to call for assistance with activity   - Consult OT/PT to assist with strengthening/mobility   - Keep Call bell within reach  - Keep bed low and locked with side rails adjusted as appropriate  - Keep care items and personal belongings within reach  - Initiate and maintain comfort rounds  - Make Fall Risk Sign visible to staff  - Offer Toileting every 2 Hours, in advance of need  - Initiate/Maintain bed alarm    - Apply yellow socks and bracelet for high fall risk patients  - Consider moving patient to room near nurses station  Outcome: Progressing     Problem: DISCHARGE PLANNING  Goal: Discharge to home or other facility with appropriate resources  Description: INTERVENTIONS:  - Identify barriers to discharge w/patient and caregiver  - Arrange for needed discharge resources and transportation as appropriate  - Identify discharge learning needs (meds, wound care, etc )  - Arrange for interpretive services to assist at discharge as needed  - Refer to Case Management Department for coordinating discharge planning if the patient needs post-hospital services based on physician/advanced practitioner order or complex needs related to functional status, cognitive ability, or social support system  Outcome: Progressing     Problem: Knowledge Deficit  Goal: Patient/family/caregiver demonstrates understanding of disease process, treatment plan, medications, and discharge instructions  Description: Complete learning assessment and assess knowledge base    Interventions:  - Provide teaching at level of understanding  - Provide teaching via preferred learning methods  Outcome: Progressing Problem: Nutrition/Hydration-ADULT  Goal: Nutrient/Hydration intake appropriate for improving, restoring or maintaining nutritional needs  Description: Monitor and assess patient's nutrition/hydration status for malnutrition  Collaborate with interdisciplinary team and initiate plan and interventions as ordered  Monitor patient's weight and dietary intake as ordered or per policy  Utilize nutrition screening tool and intervene as necessary  Determine patient's food preferences and provide high-protein, high-caloric foods as appropriate       INTERVENTIONS:  - Monitor oral intake, urinary output, labs, and treatment plans  - Assess nutrition and hydration status and recommend course of action  - Evaluate amount of meals eaten  - Assist patient with eating if necessary   - Allow adequate time for meals  - Recommend/ encourage appropriate diets, oral nutritional supplements, and vitamin/mineral supplements  - Order, calculate, and assess calorie counts as needed  - Recommend, monitor, and adjust tube feedings and TPN/PPN based on assessed needs  - Assess need for intravenous fluids  - Provide specific nutrition/hydration education as appropriate  - Include patient/family/caregiver in decisions related to nutrition  Outcome: Progressing     Problem: Prexisting or High Potential for Compromised Skin Integrity  Goal: Skin integrity is maintained or improved  Description: INTERVENTIONS:  - Identify patients at risk for skin breakdown  - Assess and monitor skin integrity  - Assess and monitor nutrition and hydration status  - Monitor labs   - Assess for incontinence   - Turn and reposition patient  - Assist with mobility/ambulation  - Relieve pressure over bony prominences  - Avoid friction and shearing  - Provide appropriate hygiene as needed including keeping skin clean and dry  - Evaluate need for skin moisturizer/barrier cream  - Collaborate with interdisciplinary team   - Patient/family teaching  - Consider wound care consult   Outcome: Progressing     Problem: Potential for Falls  Goal: Patient will remain free of falls  Description: INTERVENTIONS:  - Educate patient/family on patient safety including physical limitations  - Instruct patient to call for assistance with activity   - Consult OT/PT to assist with strengthening/mobility   - Keep Call bell within reach  - Keep bed low and locked with side rails adjusted as appropriate  - Keep care items and personal belongings within reach  - Initiate and maintain comfort rounds  - Make Fall Risk Sign visible to staff  - Offer Toileting every 2 Hours, in advance of need  - Initiate/Maintain bed alarm    - Apply yellow socks and bracelet for high fall risk patients  - Consider moving patient to room near nurses station  Outcome: Progressing     Problem: Neurological Deficit  Goal: Neurological status is stable or improving  Description: Interventions:  - Monitor and assess patient's level of consciousness, motor function, sensory function, and level of assistance needed for ADLs  - Monitor and report changes from baseline  Collaborate with interdisciplinary team to initiate plan and implement interventions as ordered  - Provide and maintain a safe environment  - Consider seizure precautions  - Consider fall precautions  - Consider aspiration precautions  - Consider bleeding precautions  Outcome: Progressing     Problem: Activity Intolerance/Impaired Mobility  Goal: Mobility/activity is maintained at optimum level for patient  Description: Interventions:  - Assess and monitor patient  barriers to mobility and need for assistive/adaptive devices  - Assess patient's emotional response to limitations  - Collaborate with interdisciplinary team and initiate plans and interventions as ordered  - Encourage independent activity per ability   - Maintain proper body alignment  - Perform active/passive rom as tolerated/ordered    - Plan activities to conserve energy   - Turn patient as appropriate  Outcome: Progressing     Problem: Communication Impairment  Goal: Ability to express needs and understand communication  Description: Assess patient's communication skills and ability to understand information  Patient will demonstrate use of effective communication techniques, alternative methods of communication and understanding even if not able to speak  - Encourage communication and provide alternate methods of communication as needed  - Collaborate with case management/ for discharge needs  - Include patient/family/caregiver in decisions related to communication  Outcome: Progressing     Problem: Potential for Aspiration  Goal: Ventilated patient's risk of aspiration is minimized  Description: Assess and monitor vital signs, respiratory status, airway cuff pressure, and labs (WBC)  Monitor for signs of aspiration (tachypnea, cough, rales, wheezing, cyanosis, fever)  - Elevate head of bed 30 degrees if patient has tube feeding   - Monitor tube feeding  Outcome: Progressing     Problem: Nutrition  Goal: Nutrition/Hydration status is improving  Description: Monitor and assess patient's nutrition/hydration status for malnutrition (ex- brittle hair, bruises, dry skin, pale skin and conjunctiva, muscle wasting, smooth red tongue, and disorientation)  Collaborate with interdisciplinary team and initiate plan and interventions as ordered  Monitor patient's weight and dietary intake as ordered or per policy  Utilize nutrition screening tool and intervene per policy  Determine patient's food preferences and provide high-protein, high-caloric foods as appropriate  - Assist patient with eating   - Allow adequate time for meals   - Encourage patient to take dietary supplement as ordered  - Collaborate with clinical nutritionist   - Include patient/family/caregiver in decisions related to nutrition    Outcome: Progressing

## 2022-09-03 NOTE — QUICK NOTE
Alerted by RN that patient's pupils are minimally reactive on her neuro exam  Upon my evaluation, patient resting comfortably in bed  He does not verbally respond to questions, but will nod yes or shake his head no  He says he feels ok, has a mild headache that may have started this morning, but is not lightheaded or dizzy  He was minimally participatory in my neuro exam; he would not squeeze my fingers and would only move his RUE spontaneously, but would not follow any commands regarding any of his extremities (consistent with his reported baseline)  On my exam, his left pupil is slightly larger than his right pupil  Both pupils were minimally reactive to light, R>L  This is consistent with prior documentation by Neurology as recently as 9/1/2022 morning  Will observe for now
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
## 2022-09-04 NOTE — UTILIZATION REVIEW
Continued Stay Review    Date: 9/3/2022                        Current Patient Class: inpatient  Current Level of Care: med/surg  HPI:61 y o  male initially admitted on 8/26 with acute R thalamic intraparenchymal hemorrhage  Assessment/Plan: Pt extremely lethargic today on initial examination  Only responding to sternal rub  On re-exam later in the day pt was more awake  Continues to endorse frontal headache, fatigue and left-sided weakness  Pupils appear symmetric but minimally reactive, pt generally uncooperative with exam although there is purposeful RUE movement  Will reinstitute modafinil to help with level of responsiveness/engagement  CT head ordered to ensure that pt is not manifesting recurrent cerebral edema/midline shift  Neurology following, etiology likely embolic with hemorrhagic conversion, though embolic workup has been negative as of yet  Thrombosis panel pending  Holding all AC/AP  Continue po antihypertensives for SBP goal <140  Prn labetalol or hydralazine  Continue statin  Accuchecks w/ ssi  Pt accepted at [facility] acute rehab, awaiting available bed and insurance auth        Vital Signs:   Date/Time Temp Pulse Resp BP MAP (mmHg) SpO2 O2 Device Patient Position - Orthostatic VS   09/03/22 21:05:31 99 9 °F (37 7 °C) 78 17 129/75 93 91 % None (Room air) Lying   09/03/22 17:16:57 -- 84 -- 133/80 98 93 % -- --   09/03/22 1714 -- 84 -- 133/80 -- -- -- --   09/03/22 15:16:02 99 3 °F (37 4 °C) 82 -- 113/70 84 93 % -- --   09/03/22 11:33:53 -- 92 -- 132/84 100 94 % -- --   09/03/22 10:40:57 99 2 °F (37 3 °C) 85 -- -- -- 95 % -- --   09/03/22 10:00:33 -- 77 -- 151/88 109 92 % -- --   09/03/22 0850 100 5 °F (38 1 °C) -- -- -- -- -- -- --   09/03/22 08:25:55 100 4 °F (38 °C) 75 16 151/90 110 93 % None (Room air) Lying   09/03/22 07:39:06 99 4 °F (37 4 °C) 79 -- 149/90 110 96 % -- --   09/03/22 0615 -- 77 -- 142/89 107 94 % -- --   09/03/22 04:38:59 -- 90 -- 149/90 110 94 % -- --   09/02/22 22:12:21 99 3 °F (37 4 °C) 82 -- 167/97 120 95 % -- --   09/02/22 19:13:29 98 7 °F (37 1 °C) 72 -- 142/78 99 95 % -- --   09/02/22 16:54:25 -- 65 -- 142/78 99 94 % -- --   09/02/22 15:01:56 98 4 °F (36 9 °C) 61 18 141/78 99 99 % None (Room air) Lying   09/02/22 13:15:49 -- 81 -- 114/70 85 95 % -- --   09/02/22 07:49:02 99 2 °F (37 3 °C) 70 16 144/88 107 97 % -- --         Pertinent Labs/Diagnostic Results:     Results from last 7 days   Lab Units 9/03/22 0943 09/02/22 0516 09/01/22  0600 08/31/22  0528   WBC Thousand/uL 11 40* 9 31 10 10 8 99   HEMOGLOBIN g/dL 17 7* 16 4 15 4 15 1   HEMATOCRIT % 51 7* 46 6 45 5 46 5   PLATELETS Thousands/uL 411* 310 297 268   NEUTROS ABS Thousands/µL 9 81* 7 52  --   --      Results from last 7 days   Lab Units 09/03/22  0943 09/02/22  0516 09/01/22 0600 08/31/22  0621 08/31/22  0528 08/29/22  0531   SODIUM mmol/L 136 139 141  --  138 142   POTASSIUM mmol/L 4 1 3 8 4 0  --  4 1 3 6   CHLORIDE mmol/L 105 105 110*  --  112* 109*   CO2 mmol/L 27 28 24  --  21 25   ANION GAP mmol/L 4 6 7  --  5 8   BUN mg/dL 34* 30* 30*  --  25 24   CREATININE mg/dL 1 08 0 97 0 84  --  0 86 1 09   EGFR ml/min/1 73sq m 73 83 93  --  92 72   CALCIUM mg/dL 8 5 8 6 8 1*  --  7 9* 8 3   CALCIUM, IONIZED mmol/L  --   --  0 87* 0 98*  --   --    MAGNESIUM mg/dL  --  2 6 2 8*  --  2 7* 2 5   PHOSPHORUS mg/dL  --  3 3 3 4  --  3 1 4 6*     Results from last 7 days   Lab Units 09/04/22  1639 09/04/22  1041 09/04/22  0602 09/03/22  2103 09/03/22  1555 09/03/22  1039 09/03/22  0614 09/02/22  2050 09/02/22  1606 09/02/22  1056 09/02/22  0834 09/01/22  2113   POC GLUCOSE mg/dl 155* 160* 115 157* 193* 151* 143* 131 134 177* 131 146*     Results from last 7 days   Lab Units 09/03/22  0943 09/02/22  0516 09/01/22  0600 08/31/22  0528 08/30/22  0514 08/29/22  0531   GLUCOSE RANDOM mg/dL 163* 151* 119 150* 128 314*         Medications:   Scheduled Medications:  amLODIPine, 10 mg, Oral, Daily  atorvastatin, 40 mg, Oral, After Dinner  carvedilol, 12 5 mg, Oral, BID With Meals  chlorhexidine, 15 mL, Mouth/Throat, Q12H [facility]  chlorthalidone, 25 mg, Oral, Daily  citalopram, 10 mg, Oral, Daily  insulin lispro, 2-12 Units, Subcutaneous, 4x Daily (with meals and at bedtime)  lisinopril, 40 mg, Oral, Daily  modafinil, 100 mg, Oral, Daily  polyethylene glycol, 17 g, Oral, Daily  senna-docusate sodium, 2 tablet, Oral, BID    PRN Meds:  acetaminophen, 325 mg, Rectal, Q4H PRN  bisacodyl, 10 mg, Rectal, Daily PRN  butalbital-acetaminophen-caffeine, 1 tablet, Oral, Q6H PRN  hydrALAZINE, 10 mg, Intravenous, Q4H PRN  labetalol, 20 mg, Intravenous, Q4H PRN  ondansetron, 4 mg, Intravenous, Q6H PRN        Discharge Plan: D    Network Utilization Review Department  ATTENTION: Please call with any questions or concerns to [phone] and carefully listen to the prompts so that you are directed to the right person  All voicemails are confidential   [facility] all requests for admission clinical reviews, approved or denied determinations and any other requests to dedicated fax number below belonging to the campus where the patient is receiving treatment   List of dedicated fax numbers for the Facilities:  [address] DENIALS (Administrative/Medical Necessity) [phone]   [address] (Maternity/NICU/Pediatrics) [phone]   [address]  [phone]   [address] [place] [address] [address] [place] [address] [address] [place] [address] [address] [place] [address] [phone]
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
## 2022-09-04 NOTE — QUICK NOTE
Alerted by RN that patient's tele revealed "ST elevation in V1"  Upon my review, patient has no events recorded on telemetry  Patient woke up with verbal stimulation and sternal rub  He nodded his head once that he can hear me, but did not further participate in review of systems or physical exam   So, unable to ascertain if patient was experiencing chest pain/discomfort, dyspnea, palpitations, lightheadedness/dizziness, abdominal pain, etc     /75, HR 70s in NSR, SpO2 93% on RA  12 lead ECG obtained showing NSR without ST elevations or depressions  QTc mildly prolonged  T-wave inversions in lateral precordial leads consistent prior ECG  Will observe for now  Instructed RN to notify us if patient becomes hemodynamically unstable

## 2022-09-04 NOTE — PLAN OF CARE
Problem: MOBILITY - ADULT  Goal: Maintain or return to baseline ADL function  Description: INTERVENTIONS:  -  Assess patient's ability to carry out ADLs; assess patient's baseline for ADL function and identify physical deficits which impact ability to perform ADLs (bathing, care of mouth/teeth, toileting, grooming, dressing, etc )  - Assess/evaluate cause of self-care deficits   - Assess range of motion  - Assess patient's mobility; develop plan if impaired  - Assess patient's need for assistive devices and provide as appropriate  - Encourage maximum independence but intervene and supervise when necessary  - Involve family in performance of ADLs  - Assess for home care needs following discharge   - Consider OT consult to assist with ADL evaluation and planning for discharge  - Provide patient education as appropriate  Outcome: Progressing  Goal: Maintains/Returns to pre admission functional level  Description: INTERVENTIONS:  - Perform BMAT or MOVE assessment daily    - Set and communicate daily mobility goal to care team and patient/family/caregiver     - Collaborate with rehabilitation services on mobility goals if consulted  - Out of bed for toileting  - Record patient progress and toleration of activity level   Outcome: Progressing     Problem: PAIN - ADULT  Goal: Verbalizes/displays adequate comfort level or baseline comfort level  Description: Interventions:  - Encourage patient to monitor pain and request assistance  - Assess pain using appropriate pain scale  - Administer analgesics based on type and severity of pain and evaluate response  - Implement non-pharmacological measures as appropriate and evaluate response  - Consider cultural and social influences on pain and pain management  - Notify physician/advanced practitioner if interventions unsuccessful or patient reports new pain  Outcome: Progressing     Problem: INFECTION - ADULT  Goal: Absence or prevention of progression during hospitalization  Description: INTERVENTIONS:  - Assess and monitor for signs and symptoms of infection  - Monitor lab/diagnostic results  - Monitor all insertion sites, i e  indwelling lines, tubes, and drains  - Monitor endotracheal if appropriate and nasal secretions for changes in amount and color  - [place] appropriate cooling/warming therapies per order  - Administer medications as ordered  - Instruct and encourage patient and family to use good hand hygiene technique  - Identify and instruct in appropriate isolation precautions for identified infection/condition  Outcome: Progressing  Goal: Absence of fever/infection during neutropenic period  Description: INTERVENTIONS:  - Monitor WBC    Outcome: Progressing     Problem: SAFETY ADULT  Goal: Maintain or return to baseline ADL function  Description: INTERVENTIONS:  -  Assess patient's ability to carry out ADLs; assess patient's baseline for ADL function and identify physical deficits which impact ability to perform ADLs (bathing, care of mouth/teeth, toileting, grooming, dressing, etc )  - Assess/evaluate cause of self-care deficits   - Assess range of motion  - Assess patient's mobility; develop plan if impaired  - Assess patient's need for assistive devices and provide as appropriate  - Encourage maximum independence but intervene and supervise when necessary  - Involve family in performance of ADLs  - Assess for home care needs following discharge   - Consider OT consult to assist with ADL evaluation and planning for discharge  - Provide patient education as appropriate  Outcome: Progressing  Goal: Maintains/Returns to pre admission functional level  Description: INTERVENTIONS:  - Perform BMAT or MOVE assessment daily    - Set and communicate daily mobility goal to care team and patient/family/caregiver     - Collaborate with rehabilitation services on mobility goals if consulted  - Out of bed for toileting  - Record patient progress and toleration of activity level   Outcome: Progressing  Goal: Patient will remain free of falls  Description: INTERVENTIONS:  - Educate patient/family on patient safety including physical limitations  - Instruct patient to call for assistance with activity   - Consult OT/PT to assist with strengthening/mobility   - Keep Call bell within reach  - Keep bed low and locked with side rails adjusted as appropriate  - Keep care items and personal belongings within reach  - Initiate and maintain comfort rounds  - Make Fall Risk Sign visible to staff  - Apply yellow socks and bracelet for high fall risk patients  - Consider moving patient to room near nurses station  Outcome: Progressing     Problem: DISCHARGE PLANNING  Goal: Discharge to home or other facility with appropriate resources  Description: INTERVENTIONS:  - Identify barriers to discharge w/patient and caregiver  - Arrange for needed discharge resources and transportation as appropriate  - Identify discharge learning needs (meds, wound care, etc )  - Arrange for interpretive services to assist at discharge as needed  - Refer to Case Management Department for coordinating discharge planning if the patient needs post-hospital services based on physician/advanced practitioner order or complex needs related to functional status, cognitive ability, or social support system  Outcome: Progressing     Problem: Knowledge Deficit  Goal: Patient/family/caregiver demonstrates understanding of disease process, treatment plan, medications, and discharge instructions  Description: Complete learning assessment and assess knowledge base  Interventions:  - Provide teaching at level of understanding  - Provide teaching via preferred learning methods  Outcome: Progressing     Problem: Nutrition/Hydration-ADULT  Goal: Nutrient/Hydration intake appropriate for improving, restoring or maintaining nutritional needs  Description: Monitor and assess patient's nutrition/hydration status for malnutrition  Collaborate with interdisciplinary team and initiate plan and interventions as ordered  Monitor patient's weight and dietary intake as ordered or per policy  Utilize nutrition screening tool and intervene as necessary  Determine patient's food preferences and provide high-protein, high-caloric foods as appropriate       INTERVENTIONS:  - Monitor oral intake, urinary output, labs, and treatment plans  - Assess nutrition and hydration status and recommend course of action  - Evaluate amount of meals eaten  - Assist patient with eating if necessary   - Allow adequate time for meals  - Recommend/ encourage appropriate diets, oral nutritional supplements, and vitamin/mineral supplements  - Order, calculate, and assess calorie counts as needed  - Recommend, monitor, and adjust tube feedings and TPN/PPN based on assessed needs  - Assess need for intravenous fluids  - Provide specific nutrition/hydration education as appropriate  - Include patient/family/caregiver in decisions related to nutrition  Outcome: Progressing     Problem: Prexisting or High Potential for Compromised Skin Integrity  Goal: Skin integrity is maintained or improved  Description: INTERVENTIONS:  - Identify patients at risk for skin breakdown  - Assess and monitor skin integrity  - Assess and monitor nutrition and hydration status  - Monitor labs   - Assess for incontinence   - Turn and reposition patient  - Assist with mobility/ambulation  - Relieve pressure over bony prominences  - Avoid friction and shearing  - Provide appropriate hygiene as needed including keeping skin clean and dry  - Evaluate need for skin moisturizer/barrier cream  - Collaborate with interdisciplinary team   - Patient/family teaching  - Consider wound care consult   Outcome: Progressing     Problem: Potential for Falls  Goal: Patient will remain free of falls  Description: INTERVENTIONS:  - Educate patient/family on patient safety including physical limitations  - Instruct patient to call for assistance with activity   - Consult OT/PT to assist with strengthening/mobility   - Keep Call bell within reach  - Keep bed low and locked with side rails adjusted as appropriate  - Keep care items and personal belongings within reach  - Initiate and maintain comfort rounds  - Make Fall Risk Sign visible to staff  - Apply yellow socks and bracelet for high fall risk patients  - Consider moving patient to room near nurses station  Outcome: Progressing     Problem: Neurological Deficit  Goal: Neurological status is stable or improving  Description: Interventions:  - Monitor and assess patient's level of consciousness, motor function, sensory function, and level of assistance needed for ADLs  - Monitor and report changes from baseline  Collaborate with interdisciplinary team to initiate plan and implement interventions as ordered  - Provide and maintain a safe environment  - Consider seizure precautions  - Consider fall precautions  - Consider aspiration precautions  - Consider bleeding precautions  Outcome: Progressing     Problem: Activity Intolerance/Impaired Mobility  Goal: Mobility/activity is maintained at optimum level for patient  Description: Interventions:  - Assess and monitor patient  barriers to mobility and need for assistive/adaptive devices  - Assess patient's emotional response to limitations  - Collaborate with interdisciplinary team and initiate plans and interventions as ordered  - Encourage independent activity per ability   - Maintain proper body alignment  - Perform active/passive rom as tolerated/ordered  - Plan activities to conserve energy   - Turn patient as appropriate  Outcome: Progressing     Problem: Communication Impairment  Goal: Ability to express needs and understand communication  Description: Assess patient's communication skills and ability to understand information    Patient will demonstrate use of effective communication techniques, alternative methods of communication and understanding even if not able to speak  - Encourage communication and provide alternate methods of communication as needed  - Collaborate with case management/ for discharge needs  - Include patient/family/caregiver in decisions related to communication  Outcome: Progressing     Problem: Potential for Aspiration  Goal: Ventilated patient's risk of aspiration is minimized  Description: Assess and monitor vital signs, respiratory status, airway cuff pressure, and labs (WBC)  Monitor for signs of aspiration (tachypnea, cough, rales, wheezing, cyanosis, fever)  - Elevate head of bed 30 degrees if patient has tube feeding   - Monitor tube feeding  Outcome: Progressing     Problem: Nutrition  Goal: Nutrition/Hydration status is improving  Description: Monitor and assess patient's nutrition/hydration status for malnutrition (ex- brittle hair, bruises, dry skin, pale skin and conjunctiva, muscle wasting, smooth red tongue, and disorientation)  Collaborate with interdisciplinary team and initiate plan and interventions as ordered  Monitor patient's weight and dietary intake as ordered or per policy  Utilize nutrition screening tool and intervene per policy  Determine patient's food preferences and provide high-protein, high-caloric foods as appropriate  - Assist patient with eating   - Allow adequate time for meals   - Encourage patient to take dietary supplement as ordered  - Collaborate with clinical nutritionist   - Include patient/family/caregiver in decisions related to nutrition    Outcome: Progressing

## 2022-09-05 ENCOUNTER — ANESTHESIA (INPATIENT)
Dept: RADIOLOGY | Facility: HOSPITAL | Age: 62
DRG: 981 | End: 2022-09-05
Payer: COMMERCIAL

## 2022-09-05 ENCOUNTER — ANESTHESIA EVENT (INPATIENT)
Dept: RADIOLOGY | Facility: HOSPITAL | Age: 62
DRG: 981 | End: 2022-09-05
Payer: COMMERCIAL

## 2022-09-05 RX ORDER — HEPARIN SODIUM 1000 [USP'U]/ML
INJECTION, SOLUTION INTRAVENOUS; SUBCUTANEOUS AS NEEDED
Status: DISCONTINUED | OUTPATIENT
Start: 2022-09-05 | End: 2022-09-05

## 2022-09-05 RX ORDER — ROCURONIUM BROMIDE 10 MG/ML
INJECTION, SOLUTION INTRAVENOUS AS NEEDED
Status: DISCONTINUED | OUTPATIENT
Start: 2022-09-05 | End: 2022-09-05

## 2022-09-05 RX ORDER — PROPOFOL 10 MG/ML
INJECTION, EMULSION INTRAVENOUS CONTINUOUS PRN
Status: DISCONTINUED | OUTPATIENT
Start: 2022-09-05 | End: 2022-09-05

## 2022-09-05 RX ORDER — SODIUM CHLORIDE 9 MG/ML
INJECTION, SOLUTION INTRAVENOUS CONTINUOUS PRN
Status: DISCONTINUED | OUTPATIENT
Start: 2022-09-05 | End: 2022-09-05

## 2022-09-05 RX ADMIN — HEPARIN SODIUM 2000 UNITS: 1000 INJECTION INTRAVENOUS; SUBCUTANEOUS at 19:34

## 2022-09-05 RX ADMIN — INSULIN HUMAN 5 UNITS: 100 INJECTION, SOLUTION PARENTERAL at 19:23

## 2022-09-05 RX ADMIN — HEPARIN SODIUM 5000 UNITS: 1000 INJECTION INTRAVENOUS; SUBCUTANEOUS at 19:25

## 2022-09-05 RX ADMIN — EPINEPHRINE 5 MCG/MIN: 1 INJECTION, SOLUTION, CONCENTRATE INTRAVENOUS at 18:49

## 2022-09-05 RX ADMIN — ROCURONIUM BROMIDE 20 MG: 50 INJECTION INTRAVENOUS at 19:44

## 2022-09-05 RX ADMIN — ROCURONIUM BROMIDE 30 MG: 50 INJECTION INTRAVENOUS at 20:29

## 2022-09-05 RX ADMIN — SODIUM CHLORIDE: 0.9 INJECTION, SOLUTION INTRAVENOUS at 18:45

## 2022-09-05 RX ADMIN — ROCURONIUM BROMIDE 50 MG: 50 INJECTION INTRAVENOUS at 18:45

## 2022-09-05 RX ADMIN — PROPOFOL 20 MCG/KG/MIN: 10 INJECTION, EMULSION INTRAVENOUS at 20:20

## 2022-09-05 NOTE — ANESTHESIA PREPROCEDURE EVALUATION
Procedure:  IR PE ENDOVASCULAR THERAPY    Relevant Problems   CARDIO   (+) Hypertension             Anesthesia Plan  ASA Score- 5 Emergent    Anesthesia Type- general with ASA Monitors  Additional Monitors: arterial line and central venous line  Airway Plan: ETT  Comment: Stand ASA mon (ett, lines in placed)  FRENCH    Plan Factors-    Chart reviewed  Existing labs reviewed  Induction- intravenous  Postoperative Plan-     Informed Consent- Anesthetic plan and risks discussed with patient  I personally reviewed this patient with the CRNA  Discussed and agreed on the Anesthesia Plan with the CRNA  Ashish Ramos

## 2022-09-05 NOTE — PROCEDURES
Central Line Insertion    Date/Time: 9/5/2022 11:56 AM  Performed by: KIM Spencer  Authorized by: KIM Spencer     Patient location:  ICU  Other Assisting Provider: No    Consent:     Consent obtained:  Emergent situation  Universal protocol:     Procedure explained and questions answered to patient or proxy's satisfaction: no      Relevant documents present and verified: no      Test results available and properly labeled: no      Radiology Images displayed and confirmed  If images not available, report reviewed: no      Required blood products, implants, devices, and special equipment available: no      Site/side marked: yes      Immediately prior to procedure, a time out was called: yes      Patient identity confirmed:  Arm band  Pre-procedure details:     Hand hygiene: Hand hygiene performed prior to insertion      Sterile barrier technique: All elements of maximal sterile technique followed      Skin preparation:  2% chlorhexidine    Skin preparation agent: Skin preparation agent completely dried prior to procedure    Indications:     Central line indications: medications requiring central line and other (comment)      Central line indications comment:  Cardaic arrest    Site selection rationale:  R femroal, avoided IJ due to potiential neuro complications  Anesthesia (see MAR for exact dosages):      Anesthesia method:  None  Procedure details:     Location:  Right femoral    Vessel type: vein      Laterality:  Right    Patient position:  Flat    Catheter type:  Triple lumen    Catheter size:  7 5 Fr    Landmarks identified: yes      Ultrasound guidance: yes      Ultrasound image availability:  Not saved and not obtained due to urgency    Sterile ultrasound techniques: Sterile gel and sterile probe covers were used      Manometry confirmation: yes      Number of attempts:  1    Successful placement: yes    Post-procedure details:     Post-procedure:  Dressing applied and line sutured    Assessment:  Blood return through all ports    Post-procedure complications: none      Patient tolerance of procedure:   Tolerated well, no immediate complications

## 2022-09-05 NOTE — CONSULTS
[facility] Electronic [facility] Record Consult  IR has been consulted to evaluate the patient, determine the appropriate procedure, and whether or not a procedure can and should be performed regarding the care of Leah Welsh  We were consulted by pulmonary/critical care concerning Leah Welsh, and to possibly perform a pulmonary embolectomy if medically appropriate for the patient  The patient is aware that a specialty consultation is taking place, and agrees to the IR Consult on their behalf  Assessment/Plan:   56 yo male with history of right thalamic hemorrhage had a deterioration today which became asystole  ROSC obtained  CT of chest shows PE  Echo demonstrated thrombus in transit  Not a candidate for tPA  Critical care spoke with neurosurgery and agreed to anticoagulation  Thrombus in transit may be potentially removed using suction thrombectomy and will be attempted  I spent 45 minutes in medical consultative time evaluating the medical record and imaging of Leah Welsh  Thank you for allowing [facility] to participate in the care of Leah Welsh  Please don't hesitate to call or TigerText us with any questions       Marleni Schrader, DO

## 2022-09-05 NOTE — ED PROCEDURE NOTE
Procedure  Intubation    Date/Time: 9/5/2022 10:51 AM  Performed by: Peggy Webber MD  Authorized by: Peggy Webber MD     Patient location:  Bedside  Consent:     Consent obtained:  Emergent situation  Universal protocol:     Patient identity confirmed: Anonymous protocol, patient vented/unresponsive  Pre-procedure details:     Patient status:  Unresponsive    Pretreatment medications:  None    Paralytics:  None  Indications:     Indications for intubation: respiratory failure and hypoxemia    Procedure details:     Preoxygenation:  Bag valve mask    CPR in progress: yes      Intubation method:  Oral    Oral intubation technique:  Direct and glidescope    Laryngoscope blade: Mac 3 and Mac 4    Tube size (mm):  7 5    Tube type:  Cuffed    Number of attempts:  2    Ventilation between attempts: yes      Cricoid pressure: yes      Tube visualized through cords: yes    Placement assessment:     ETT to lip:  21    Tube secured with: ETT tie  Breath sounds:  Equal and absent over the epigastrium    Placement verification: chest rise, condensation, direct visualization, equal breath sounds, ETCO2 detector and tube exhalation    Post-procedure details:     Patient tolerance of procedure:   Tolerated well, no immediate complications                     Peggy Webber MD  09/05/22 1053

## 2022-09-05 NOTE — RESPIRATORY THERAPY NOTE
RT Ventilator Management Note  Rose Mckeon 57 y o  male MRN: [id number]  [id number]      Daily Screen         9/5/2022  1127             Patient safety screen outcome[de-identified] Failed    Not Ready for Weaning due to[de-identified] Underline problem not resolved              Physical Exam:   Assessment Type: Assess only  General Appearance: Sedated  Respiratory Pattern: Assisted  Chest Assessment: Chest expansion symmetrical  Bilateral Breath Sounds: Diminished      Resp Comments: Pt was a code up on the 7th floor, pt was intubated and brought down to the MICU and placed on vent  When pt was being prepared to be transported to CAT scan pt coded again  CPR was performed and pt was bagged  Pts pulse returned after CPR was performed and pt was placed back on vent  Will continue to monitor pts status
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
## 2022-09-05 NOTE — PROGRESS NOTES
Patient arrived to IR for pulmonary and right heart embolectomy    The procedure and risks were discussed with the patient's wife Humberto by phone  All questions were answered  Informed consent was obtained

## 2022-09-05 NOTE — CODE DOCUMENTATION
Rapid response called, upon arrival to patients room, patient became unresponsive, lost pulses, and CPR initiated

## 2022-09-05 NOTE — QUICK NOTE
Alerted by RN that patient has fever to 101F with tachycardia into 110s  On exam, patient slightly more responsive today due to starting provigil  He still does not respond verbally to questioning, but will nod his head yes or no  He says he has a headache (which is not new per his RN)  Otherwise he denies dyspnea, chest pain, cough, lightheadedness, dizziness  On exam, patient is tachycardia without murmurs  Lungs CTA bilaterally but patient did not inhale effectively to allow proper auscultation  Per chart review, it seems there is a concern for aspiration already as patient has had mild fevers overnight for several nights  Will obtain STAT CXR  Patient has gentle fluid bolus running already  With SBP goal <140, will order another 1L bolus to be run over 6 hours  Will get blood cultures and start antibiotics if CXR shows any evidence of developing infection  UPDATE 0115  CXR official read pending  Possible trace opacification in RLL consistent with suspected aspiration  Will initiate infectious workup with blood cultures, lactate, strep pneumo urine, legionella urine, procalcitonin  Will initiate CTX 1g q24h for possible aspiration pneumonia vs pneumonitis (less likely)

## 2022-09-05 NOTE — PROGRESS NOTES
Vancomycin IV Pharmacy-to-Dose Consultation    Marianela Mariano is a 57 y o  male who is currently receiving vancomycin IV with management by the Pharmacy Consult service  Relevant clinical data and objective / subjective history reviewed  Vancomycin Assessment:  Indication: shock  Status: critically ill, s/p cardiac arrest  Micro:   9/5 blood cx: in process  9/5 strep and legionella urine: need to be collected  Renal Function: BARBARA, serum creatinine increased from 1 26 to 1 75  Days of Therapy: 1  Current Dose: new start  Goal Trough: 15-20  Goal AUC(24h): 400-600  Last Level: n/a      Vancomycin Plan:  New Dosing: change to 1750 mg (25 mg/kg AdjBW) IV x1 followed by 1000 mg (15 mg/kg AdjBW) IV daily PRN when random vancomycin level is less 20  Next Level: random level 9/6 with AM labs  Renal Function Monitoring: daily BMP and UOP assessment      Pharmacy will continue to follow closely for s/sx of nephrotoxicity, infusion reactions and appropriateness of therapy  BMP and CBC will be ordered per protocol  We will continue to follow the patient's culture results and clinical progress daily         Thank you,  Mary Jonas, PharmD, [facility] 6 Pharmacist  [phone]
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
## 2022-09-05 NOTE — PLAN OF CARE
Problem: Nutrition/Hydration-ADULT  Goal: Nutrient/Hydration intake appropriate for improving, restoring or maintaining nutritional needs  Description: Monitor and assess patient's nutrition/hydration status for malnutrition  Collaborate with interdisciplinary team and initiate plan and interventions as ordered  Monitor patient's weight and dietary intake as ordered or per policy  Utilize nutrition screening tool and intervene as necessary  Determine patient's food preferences and provide high-protein, high-caloric foods as appropriate       INTERVENTIONS:  - Monitor oral intake, urinary output, labs, and treatment plans  - Assess nutrition and hydration status and recommend course of action  - Evaluate amount of meals eaten  - Assist patient with eating if necessary   - Allow adequate time for meals  - Recommend/ encourage appropriate diets, oral nutritional supplements, and vitamin/mineral supplements  - Order, calculate, and assess calorie counts as needed  - Recommend, monitor, and adjust tube feedings and TPN/PPN based on assessed needs  - Assess need for intravenous fluids  - Provide specific nutrition/hydration education as appropriate  - Include patient/family/caregiver in decisions related to nutrition  Outcome: Not Progressing  Rapid Response- transferred to MICU

## 2022-09-05 NOTE — PROCEDURES
Arterial Line Insertion    Date/Time: 9/5/2022 11:48 AM  Performed by: Freddy Taylor MD  Authorized by: Freddy Taylor MD     Patient location:  ICU  Other Assisting Provider: Yes (comment)    Consent:     Consent obtained:  Emergent situation  Universal protocol:     Patient identity confirmed:  Arm band  Indications:     Indications: hemodynamic monitoring    Pre-procedure details:     Skin preparation:  Chlorhexidine  Anesthesia (see MAR for exact dosages): Anesthesia method:  None  Procedure details:     Location / Tip of Catheter:  Radial    Laterality:  Left    Placement technique:  Percutaneous    Number of attempts:  1    Successful placement: yes      Transducer: waveform confirmed    Post-procedure details:     Post-procedure:  Sutured and sterile dressing applied    Patient tolerance of procedure:   Tolerated well, no immediate complications
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
## 2022-09-05 NOTE — CONSULTS
Consults: UROLOGY  Radha Hernadez 57 y o  male [id number]   Unit/Bed #: MICU 12  Encounter: [id number]        Assessment  & Plan  :    Difficult Sandoval catheter insertion:  -physical examination reveals phimosis  -in a sterile fashion the genitalia were prepped and cleaned with iodine solution, sterile pair of Sariah Oxford clamps/forceps were used to open up the phimosis to allow access to the urethra  The area was prepped again  A 16 Salvadorean urethral Sandoval catheter was then inserted into the urinary bladder obtaining clear yellow urine  This was hooked up to drainage and secured with StatLock  Approximately 400-500 cc of clear yellow urine were obtained and catheter continued to be draining  Noted some mild bleeding from dilation of foreskin expect to improve on its own   -recommend maintaining urethral Sandoval catheter until patient is up and ambulating on medical-surgical unit  -encourage ambulation  -provided good bowel regimen  -continue medical optimization per primary team    No further  intervention required this admission  Urology will sign off  We are always available for additional questions or concerns in regards to this patient  Subjective :    Radha Hernadez  is a 57 y o  male who was admitted for Stroke (cerebrum) ([facility] [address] ) [I63 9]  With no prior urologic history  No documentation within patient's charts in regards to urologic history  Patient unable to provide subjective information due to intubation and critically ill condition  No family at bedside  Nursing staff reporting that they attempted to place urethral Sandoval catheter but were unable to  Upon placement they had received coiling of urethral Sandoval catheter in resistance  Urology was consulted for further assistance for Sandoval catheter placement  Not on File   No current outpatient medications   No past medical history on file  No past surgical history on file  No family history on file    Social History     Socioeconomic History    Marital status: /Civil Union     Spouse name: Not on file    Number of children: Not on file    Years of education: Not on file    Highest education level: Not on file   Occupational History    Not on file   Tobacco Use    Smoking status: Not on file    Smokeless tobacco: Not on file   Substance and Sexual Activity    Alcohol use: Not on file    Drug use: Not on file    Sexual activity: Not on file   Other Topics Concern    Not on file   Social History Narrative    Not on file     Social Determinants of Health     Financial Resource Strain: Not on file   Food Insecurity: No Food Insecurity    Worried About Running Out of Food in the Last Year: Never true    Beth of Food in the Last Year: Never true   Transportation Needs: Not on file   Physical Activity: Not on file   Stress: Not on file   Social Connections: Not on file   Intimate Partner Violence: Not on file   Housing Stability: [address] Unable to Pay for Housing in the Last Year: No    Number of [place] in the Last Year: 1    Unstable Housing in the Last Year: No        Review of Systems unable to provide patient intubated    Objective     Physical Exam  Constitutional:       Appearance: He is ill-appearing and toxic-appearing  HENT:      Head: Normocephalic and atraumatic  Right Ear: External ear normal       Left Ear: External ear normal       Nose: Nose normal       Mouth/Throat:      Pharynx: Oropharynx is clear  Eyes:      General: No scleral icterus  Conjunctiva/sclera: Conjunctivae normal    Cardiovascular:      Rate and Rhythm: Normal rate and regular rhythm  Pulses: Normal pulses  Heart sounds: No murmur heard  No friction rub  No gallop  Pulmonary:      Comments: intubated  Abdominal:      General: Bowel sounds are normal  There is no distension  Palpations: Abdomen is soft  Genitourinary:     Comments: Phimosis  Musculoskeletal:      Cervical back: Normal range of motion  Imaging:    CT of chest abdomen and pelvis currently pending  Labs:  Lab Results   Component Value Date    SODIUM 144 09/05/2022    K 4 2 09/05/2022     09/05/2022    CO2 29 09/05/2022    BUN 56 (H) 09/05/2022    CREATININE 1 78 (H) 09/05/2022    GLUC 201 (H) 09/05/2022    CALCIUM 9 8 09/05/2022         Lab Results   Component Value Date    WBC 15 80 (H) 09/05/2022    HGB 15 7 09/05/2022    HCT 47 3 09/05/2022    MCV 93 09/05/2022     09/05/2022         VTE Pharmacologic Prophylaxis: none, will defer to primary team   VTE Mechanical Prophylaxis: sequential compression device     Mary Mccurdy PA-C

## 2022-09-05 NOTE — SEDATION DOCUMENTATION
Arrived to IR from MICU  Intubated and Vented  Currently not sedated, not responding, no pressors  Here for right Atrial thrombectomy and PE thrrombectomy

## 2022-09-06 NOTE — RESPIRATORY THERAPY NOTE
Resp care   09/06/22 1256   Respiratory Assessment   Resp Comments ET tube advanced 2 cm from 23 to 25

## 2022-09-06 NOTE — RESPIRATORY THERAPY NOTE
RT Ventilator Management Note  Chana Aviles 57 y o  male MRN: [id number]  Unit/Bed#: [facility] 12 Encounter: [id number]      Daily Screen         9/5/2022  1127             Patient safety screen outcome[de-identified] Failed    Not Ready for Weaning due to[de-identified] Underline problem not resolved              Physical Exam:   Assessment Type: (P) Assess only  General Appearance: Sedated  Respiratory Pattern: Assisted  Chest Assessment: Chest expansion symmetrical  Bilateral Breath Sounds: Coarse  O2 Device: vent      Resp Comments: (P) Pt  back from IR  Pt  transported via ambu bag and placed on G5 upon return to MICU; settings as before

## 2022-09-06 NOTE — PROGRESS NOTES
Pastoral Care Progress Note    2022  Patient: Semaj Chavez : 1960  Admission Date & Time: 2022 1220  MRN: [id number] [facility]: [id number]       received text requesting visit for prayer for the pt   visited/prayed for the patient and his family  Will continue to refer him to next
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
## 2022-09-06 NOTE — RESPIRATORY THERAPY NOTE
RT Ventilator Management Note  Thaddeus uHggins 57 y o  male MRN: [id number]  Unit/Bed#: [facility] 12 Encounter: [id number]      Daily Screen         9/5/2022  1127 9/6/2022  0814          Patient safety screen outcome[de-identified] Failed Failed      Not Ready for Weaning due to[de-identified] Underline problem not resolved Underline problem not resolved                Physical Exam:   Assessment Type: Assess only  General Appearance: Sedated  Respiratory Pattern: Assisted  Chest Assessment: Chest expansion symmetrical  Bilateral Breath Sounds: Clear, Diminished  O2 Device: Vent      Resp Comments: (P) Pt sedated and comfortable on the vent  Pt's rate was dropped to 16 and fio2 dropped to 50%  No other vent changes made at this time, will continue to monitor pt

## 2022-09-06 NOTE — ANESTHESIA POSTPROCEDURE EVALUATION
Post-Op Assessment Note    CV Status:  Stable  Pain Score: 0    Pain management: adequate     Mental Status:  Sleepy and unresponsive   Hydration Status:  Stable   PONV Controlled:  None   Airway Patency:  Patent (ett in situ)      Post Op Vitals Reviewed: Yes      Staff: CRNA   Reason for prolonged intubation > 24 hours:  Pt received by icu rn   epi gtt running at 15mcg    propofol at 20mcg    No complications documented      BP  128/71   Temp   100 7   Pulse 111   Resp  20   SpO2   100% ett in situ

## 2022-09-06 NOTE — QUICK NOTE
S: Diana Sexton is a 57 y o  male who returns to the ICU s/p thrombectomy  EBL 150cc       O: Sedated, on mechanical ventilation    Vitals:    09/05/22 2037   BP:    Pulse:    Resp:    Temp:    SpO2: 99%       General: Nad  HEENT: normocephalic, atraumatic, perrla, neck supple, no tracheal deviation, mmm  Cardiac: rrr, no m/r/g,   Pulm: lungs cta, no w/r/r, intubated w vent settings SCMV 20/400/6/60  Abd: soft, nondistended  : joy in place   MSK: No LE edema  Neuro: gcs 3t,  Skin: lines c/d/i    A:  Pulmonary embolism S/p mechanical thrombectomy   S/p cardiac arrest  Acute R thalamic intraparenchymal hemorrhage    P:  F/u post op labs  Wean pressors as tolerated  Wean propofol as tolerated

## 2022-09-06 NOTE — BRIEF OP NOTE (RAD/CATH)
INTERVENTIONAL RADIOLOGY PROCEDURE NOTE    Date: 9/5/2022    Procedure: IR PE ENDOVASCULAR THERAPY    Preoperative diagnosis:   1  Acute right thalamic intraparenchymal hemorrhage    2  Cardiac arrest ([address] )    3  Bandemia    4  Pulmonary embolism (HCC)         Postoperative diagnosis: Same  Surgeon: Jasson Mueller DO     Assistant: None  No qualified resident was available  Blood loss: 150 cc    Specimens: thrombus disposed    Findings: clot in transit and thrombus from both right and left pulmonary arteries removed using Inari suction thrombectomy device  Pressure pre 32/17 (23);  Pressure post 70/76 (28)    Complications: None immediate      Anesthesia: local and general
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
## 2022-09-06 NOTE — CASE MANAGEMENT
Case Management Discharge Planning Note    Patient name Semaj Chavez  Location MICU 12/MICU 12 MRN [id number]  : 1960 Date 2022       Current Admission Date: 2022  Current Admission Diagnosis:Acute right thalamic intraparenchymal hemorrhage   Patient Active Problem List    Diagnosis Date Noted    Leukocytosis 2022    Hypertension 2022    Obesity (BMI 30-39 9) 2022    Cervical spinal stenosis 2022    Diabetes mellitus ([facility] [address] ) 2022    Acute right thalamic intraparenchymal hemorrhage 2022      LOS (days): 11  Geometric Mean LOS (GMLOS) (days):   Days to GMLOS:     OBJECTIVE:  Risk of Unplanned Readmission Score: 15 63         Current admission status: Inpatient   Preferred Pharmacy: No Pharmacies Listed  Primary Care Provider: No primary care provider on file  Primary Insurance: BLUE CROSS  Secondary Insurance:     DISCHARGE DETAILS:                  Additional Comments: Met with patient's wife Robin Botello, provided her with note for her employer  Brady Hay is following patient, and will have bed available when medically stable and insurance authorization obtained

## 2022-09-06 NOTE — PROGRESS NOTES
Pastoral Care Progress Note    2022  Patient: Aris Orellana : 1960  Admission Date & Time: 2022 1220  MRN: [id number] CSN: [id number]       22 1200   Clinical Encounter Type   Visited With Patient   Referral From [address] [place] [address]

## 2022-09-06 NOTE — ANESTHESIA PROCEDURE NOTES
Procedure Performed: FRENCH Anesthesia  Start Time:         Preanesthesia Checklist    Patient identified, IV assessed, risks and benefits discussed, monitors and equipment assessed, procedure being performed at surgeon's request and anesthesia consent obtained  Procedure    Diagnostic Indications for FRENCH:  hemodynamic monitoring  Type of FRENCH: complete FRENCH with interpretation  Images Saved: ultrasound permanent image saved  Physician Requesting Echo: Venus Escalona DO  Location performed: cath lab  Intubated  Bite block not placed  Heart visualized  Insertion of FRENCH Probe:  Atraumatic  Probe Type:  Multiplane  Modalities:  2D only, color flow mapping, continuous wave Doppler and pulse wave Doppler  Echocardiographic and Doppler Measurements    PREPROCEDURE    LEFT VENTRICLE:  Systolic Function: mildly impaired  Ejection Fraction: 40%  Regional Wall Motion Abnormalities: global HK  RIGHT VENTRICLE:  Systolic Function: moderately impaired  Cavity size moderately dilated  OTHER VENTRICULAR FINDINGS:  D shaped LV (bowing of IVS toward LV, more prominent in systole, suggesting RV pressure overload)  AORTIC VALVE:  Leaflets: normal  Leaflet motions normal and normal  Stenosis: none  Regurgitation: none  MITRAL VALVE:  Leaflets: normal   Regurgitation: none  TRICUSPID VALVE:  Leaflets: normal    Regurgitation: trace  ASCENDING AORTA:    Dissection not present  AORTIC ARCH:    dissection not present  DESCENDING AORTA:    Dissection not present  OTHER ATRIAL FINDINGS:  Large, irregular mass in RA - difficult to determine attachments, though there appears to be mass attachment to TV annulus and near RA/IVC junction   ATRIAL SEPTUM:  Intra-atrial septal morphology: no obvious PFO by CFD; of note, IAS bows predominantly toward LA throughout the cardiac cycle  POSTPROCEDURE    LEFT VENTRICLE: Unchanged   RIGHT VENTRICLE: Unchanged   AORTIC VALVE: Unchanged   MITRAL VALVE: Unchanged   TRICUSPID VALVE: Unchanged   ATRIA:      OTHER ATRIAL FINDINGS: RA mass removed in successive aspirations; at conclusion, no mass present; there is a filamentous mass present that most likely represents a remnant Eustachian valve/[facility] given location - I did show this to the IR physicians, and suggested this was not likely residual thrombus given appearance and location  AORTA: Unchanged   Dissection: Dissection not present  REMOVAL:  Probe Removal: atraumatic

## 2022-09-06 NOTE — UTILIZATION REVIEW
Continued Stay Review    Date: 09/05/2022                        Current Patient Class: IP  Current Level of Care: Critical Care    HPI:61 y o  male initially admitted on  08/26/2022    Assessment/Plan: Pt was febrile @ 101, tachycardic in [id number]'s overnight  He has been having fevers for a few nights  This am, became bradycardic and soon was asystole  Code Blue was called  CPR performed, given 2 Epi, I bicarb, 1 calcium  Intubated and taken to MICU  Another code blue was called  CPR performed and ROSC achieved  He was started on 2 Vasopressors, was quickly deescalated  Stat CT head, chest, A/P showing likely segmental bilateral pulmonary emboli, stable R thalamic hemorrhage  Stat echo showed RA thrombus in transit  He is now GCS 3  Need EEG to r/o subclinical seizures  Keep SBP<140 w/ Nicardipine gtt  IR and CTS Consult  IR will attemt to suction thrombectomy w/ high risk for emboli  Vent support, wean FiO2/PEEP  NPO  IV Abx  Macy and R fem CVC placed        09/05 Urology Consult: Difficult Joy Cath insertion: physical examination reveals phimosis  Joy cath placed, hooked up to drainage w/ a statlock  400-500 cc urine obtained  Maintain joy cath until pt is up and ambulating  OP Note:   Date: 9/5/2022   Procedure: IR PE ENDOVASCULAR THERAPY  Anesthesia: local and general  Findings: clot in transit and thrombus from both right and left pulmonary arteries removed using Inari suction thrombectomy device    Pressure pre 32/17 (23);  Pressure post 37/19 (28)    Vital Signs:   Date/Time Temp Pulse Resp BP MAP (mmHg) Arterial Line BP MAP SpO2 FiO2 (%) O2 Device   09/05/22 2035 -- 110 Abnormal  26 Abnormal  -- -- 100/58 70 mmHg 99 % -- --   09/05/22 2030 -- 108 Abnormal  21 -- -- 110/54 74 mmHg 99 % -- --   09/05/22 1800 -- 126 Abnormal  25 Abnormal  -- -- 176/78 108 mmHg 100 % -- --   09/05/22 1734 -- 124 Abnormal  25 Abnormal  -- -- 180/82 112 mmHg 100 % -- --   09/05/22 1700 -- 120 Abnormal  25 Abnormal  -- -- 186/84 110 mmHg 100 % -- --   09/05/22 1600 99 7 °F (37 6 °C) 120 Abnormal  25 Abnormal  -- -- 172/78 106 mmHg 100 % -- --   09/05/22 1500 -- 122 Abnormal  28 Abnormal  -- -- 184/78 108 mmHg 100 % -- --   09/05/22 1411 -- 118 Abnormal  20 79/61 Abnormal  -- 138/58 80 mmHg 100 % -- --   09/05/22 1400 102 5 °F (39 2 °C) Abnormal  122 Abnormal  20 -- -- 168/74 98 mmHg 100 % -- --   09/05/22 1325 -- -- -- -- -- -- -- 100 % -- --   09/05/22 1250 -- 100 28 Abnormal  -- -- 160/78 98 mmHg 100 % -- --   09/05/22 1245 -- 100 27 Abnormal  -- -- 142/72 88 mmHg 100 % -- --   09/05/22 1240 -- 100 27 Abnormal  -- -- 148/72 90 mmHg 100 % -- --   09/05/22 1235 -- 100 27 Abnormal  -- -- 134/68 84 mmHg 100 % -- --   09/05/22 1230 -- 100 27 Abnormal  -- -- 124/68 80 mmHg 100 % -- --   09/05/22 1225 -- 102 27 Abnormal  -- -- 114/66 78 mmHg 100 % -- --   09/05/22 1220 -- 104 27 Abnormal  79/61 Abnormal  66 92/54 64 mmHg Abnormal  99 % -- --   09/05/22 1217 -- 104 27 Abnormal  94/65 78 96/56 66 mmHg 99 % -- --   09/05/22 1210 -- 102 38 Abnormal  92/66 74 102/58 70 mmHg 99 % -- --   09/05/22 1205 -- 100 32 Abnormal  109/75 86 116/66 78 mmHg 99 % -- --   09/05/22 1200 -- 100 24 Abnormal  95/76 81 116/64 76 mmHg 98 % -- --   09/05/22 1134 -- 124 Abnormal  21 180/103 Abnormal  129 -- -- 96 % -- --   09/05/22 11:27:37 -- -- -- -- -- -- -- 99 % -- --   09/05/22 11:27:29 -- 121 Abnormal  -- -- -- -- -- -- -- --   09/05/22 1124 -- 46 Abnormal  -- -- -- -- -- -- -- --   09/05/22 1122 -- 114 Abnormal  -- 63/48 Abnormal  55 Abnormal  -- -- 93 % -- --   09/05/22 1116 -- 132 Abnormal  -- 69/50 Abnormal  59 Abnormal  -- -- 97 % -- --   09/05/22 1100 -- -- -- 185/143 Abnormal  157 -- -- -- -- --   09/05/22 10:49:33 -- 145 Abnormal  20 120/76 -- -- -- 97 % -- --   09/05/22 10:49:28 -- -- -- -- -- -- -- 100 % -- --   09/05/22 10:46:14 -- 152 Abnormal  -- 125/98 -- -- -- 100 % -- --   09/05/22 07:45:19 101 °F (38 3 °C) Abnormal  108 Abnormal  20 122/83 96 -- -- 91 % --        Pertinent Labs/Diagnostic Results:  09/05  CXR: No acute cardiopulmonary disease  CXR 2; No acute cardiopulmonary disease    Endotracheal tube in trachea with tip 5 6 cm above the kana    Nasogastric tube is in the stomach  CT Head:    Continued expected evolutionary change of the previously identified parenchymal hemorrhage centered within the right thalamus  Some resorptive blood products again identified with persistent surrounding vasogenic edema and mild localized mass effect      Improving intraventricular hemorrhage  CT chest abdomen pelvis:   Bilateral central pulmonary arterial embolism  Patient was status post thrombolysis subsequent to this study      Mild patchy groundglass infiltrates throughout both upper lobes suspicious for pneumonia; pulmonary contusions are part of the differential diagnosis given the below injuries      Acute nondisplaced fracture of the anterolateral aspect of the right 4th rib       Minimally displaced acute midsternal fracture  IR PE endovascular therapy: Impression: Successful extirpation of matter from the right and left pulmonary arteries and thrombus in transit in the right atrium using the [employer]ari Flowtriever device     EKG result: Sinus tachycardia  Left axis deviation  Incomplete right bundle branch block  Septal infarct , age undetermined  ST & T wave abnormality, consider lateral ischemia    ECHO:   Left Ventricle: Left ventricular cavity size is normal  Wall thickness is normal  The left ventricular ejection fraction is 50%  Systolic function is mildly reduced  There is mild global hypokinesis    Right Ventricle: Right ventricular cavity size is normal  Systolic function is moderately reduced    Right Atrium: There is a large multilobular thrombus in the cavity    Mitral Valve: There is mild regurgitation          Results from last 7 days   Lab Units 09/06/22  0448 09/05/22  2105 09/05/22 2015 09/05/22  1904 09/05/22  1111 09/05/22  0544   WBC Thousand/uL 18 02* 28 68*  --   --  15 80* 10 88*   HEMOGLOBIN g/dL 12 4 12 9  --   --  15 7 13 9   I STAT HEMOGLOBIN g/dl  --   --  11 2* 13 6  --   --    HEMATOCRIT % 35 7* 37 5  --   --  47 3 41 7   HEMATOCRIT, ISTAT %  --   --  33* 40  --   --    PLATELETS Thousands/uL 192 253  --   --  306 295   NEUTROS ABS Thousands/µL 15 91* 25 64*  --   --   --  8 36*   BANDS PCT %  --   --   --   --  4  --          Results from last 7 days   Lab Units 09/06/22  1225 09/06/22  0448 09/05/22  2105 09/05/22 2015 09/05/22  1904 09/05/22  1111 09/05/22  0803 09/03/22  0943 09/02/22  0516 09/01/22  0600 08/31/22  0621 08/31/22  0528   SODIUM mmol/L 149* 147 146  --   --  144 143   < > 139 141  --  138   POTASSIUM mmol/L 4 0 3 4* 3 4*  --   --  4 2 3 7   < > 3 8 4 0  --  4 1   CHLORIDE mmol/L 119* 117* 116*  --   --  107 111*   < > 105 110*  --  112*   CO2 mmol/L 25 25 22  --   --  29 29   < > 28 24  --  21   CO2, I-STAT mmol/L  --   --   --  23 27  --   --   --   --   --   --   --    ANION GAP mmol/L 5 5 8  --   --  8 3*   < > 6 7  --  5   BUN mg/dL 47* 51* 61*  --   --  56* 60*   < > 30* 30*  --  25   CREATININE mg/dL 1 12 1 23 1 77*  --   --  1 78* 1 26   < > 0 97 0 84  --  0 86   EGFR ml/min/1 73sq m 70 62 40  --   --  39 60   < > 83 93  --  92   CALCIUM mg/dL 7 9* 8 3 7 4*  --   --  9 8 8 0*   < > 8 6 8 1*  --  7 9*   CALCIUM, IONIZED mmol/L  --  1 12 1 03*  --   --   --   --   --   --  0 87*   < >  --    CALCIUM, IONIZED, ISTAT mmol/L  --   --   --  1 01* 1 08*  --   --   --   --   --   --   --    MAGNESIUM mg/dL  --  2 7* 2 7*  --   --   --   --   --  2 6 2 8*  --  2 7*   PHOSPHORUS mg/dL 3 7 1 8* 5 2*  --   --   --   --   --  3 3 3 4  --  3 1    < > = values in this interval not displayed       Results from last 7 days   Lab Units 09/05/22  2105   AST U/L 291*   ALT U/L 360*   ALK PHOS U/L 126*   TOTAL PROTEIN g/dL 5 8*   ALBUMIN g/dL 2 8*   TOTAL BILIRUBIN mg/dL 1 48*     Results from last 7 days Lab Units 09/06/22  1715 09/06/22  1155 09/06/22  0752 09/06/22  0995 09/05/22  2358 09/05/22  2109 09/05/22  1554 09/05/22  1034 09/05/22  0651 09/05/22  0106 09/04/22  2049 09/04/22  1639   POC GLUCOSE mg/dl 263* 246* 204* 223* 223* 237* 247* 177* 158* 138 167* 155*     Results from last 7 days   Lab Units 09/06/22  1225 09/06/22  0448 09/05/22  2105 09/05/22  1111 09/05/22  0803 09/04/22  0532 09/03/22  0943 09/02/22  0516 09/01/22  0600 08/31/22  0528   GLUCOSE RANDOM mg/dL 269* 243* 272* 201* 167* 111 163* 151* 119 150*     Results from last 7 days   Lab Units 09/06/22  1225 09/06/22  0910 09/06/22  0451   PH ART  7 449 7 506* 7 505*   PCO2 ART mm Hg 34 2* 30 9* 30 5*   PO2 ART mm Hg 119 1 234 0* 205 4*   HCO3 ART mmol/L 23 2 23 9 23 5   BASE EXC ART mmol/L -0 3 1 5 1 2   O2 CONTENT ART mL/dL 17 1 18 3 18 4   O2 HGB, ARTERIAL % 97 1* 98 5* 98 0*   ABG SOURCE  Line, Arterial Line, Arterial Line, Arterial         Results from last 7 days   Lab Units 09/05/22  2015 09/05/22  1904   I STAT BASE EXC mmol/L -3* 1   I STAT O2 SAT % 100*  --    ISTAT PH ART  7 361 7 411   I STAT ART PCO2 mm HG 38 8 40 0   I STAT ART PO2 mm  0* >400 0*   I STAT ART HCO3 mmol/L 22 0 25 4         Results from last 7 days   Lab Units 09/05/22  1550 09/05/22  1320 09/05/22  1111   HS TNI 0HR ng/L  --   --  367*   HS TNI 2HR ng/L  --  2,518*  --    HSTNI D2 ng/L  --  2,151*  --    HS TNI 4HR ng/L 2,302*  --   --    HSTNI D4 ng/L 1,935*  --   --          Results from last 7 days   Lab Units 09/06/22  0448   PROTIME seconds 18 3*   INR  1 49*         Results from last 7 days   Lab Units 09/06/22  0448 09/05/22  1111 09/05/22  0212   PROCALCITONIN ng/ml 7 41* 0 23 0 14     Results from last 7 days   Lab Units 09/06/22  1524 09/06/22  0003 09/05/22  2105 09/05/22  1320 09/05/22  1111   LACTIC ACID mmol/L 1 4 1 5 2 9* 2 6* 6 0*       Results from last 7 days   Lab Units 09/05/22  1551   STREP PNEUMONIAE ANTIGEN, URINE  Negative LEGIONELLA URINARY ANTIGEN  Negative       Results from last 7 days   Lab Units 09/05/22  1156 09/05/22  0154   BLOOD CULTURE  No Growth at 24 hrs  No Growth at 24 hrs  No Growth at 24 hrs  No Growth at 24 hrs  Medications:   Scheduled Medications:  atorvastatin, 40 mg, Oral, After Dinner  cefTRIAXone, 2,000 mg, Intravenous, Q24H  chlorhexidine, 15 mL, Mouth/Throat, Q12H [facility]  chlorthalidone, 25 mg, Oral, Daily  citalopram, 10 mg, Oral, Daily  heparin (porcine), 5,000 Units, Subcutaneous, Q8H [facility]  insulin lispro, 1-5 Units, Subcutaneous, Q6H [facility]  modafinil, 100 mg, Oral, Daily  polyethylene glycol, 17 g, Oral, Daily  senna-docusate sodium, 2 tablet, Oral, BID      Continuous IV Infusions:  niCARdipine, 1-15 mg/hr, Intravenous, Titrated  norepinephrine, 1-30 mcg/min, Intravenous, Titrated  propofol (DIPRIVAN) 1000 mg in 100 mL infusion (premix)  Rate: 2 2-21 6 mL/hr Dose: 5-50 mcg/kg/min  Weight Dosing Info: 72 1 kg  Freq: Titrated Route: IV  Last Dose: Stopped (09/06/22 0950)  Start: 09/05/22 2130 End: 09/06/22 0934  EPINEPHrine 3000 mcg (STANDARD CONCENTRATION) IV in sodium chloride 0 9% 250 mL  Rate: 5-50 mL/hr Dose: 1-10 mcg/min  Freq: Continuous Route: IV  Last Dose: Stopped (09/05/22 2101)  Start: 09/05/22 1200 End: 09/06/22 0934    PRN Meds:  acetaminophen, 975 mg, Oral, Q8H PRN  bisacodyl, 10 mg, Rectal, Daily PRN  fentanyl citrate (PF), 25 mcg, Intravenous, Q2H PRN 09/05 x 1  glycerin-hypromellose-, 1 drop, Both Eyes, Q6H PRN 09/05 x 1  hydrALAZINE, 10 mg, Intravenous, Q4H PRN  labetalol, 20 mg, Intravenous, Q4H PRN  ondansetron, 4 mg, Intravenous, Q6H PRN  acetaminophen (TYLENOL) rectal suppository 325 mg q4h RE 09/05 x 1      Discharge Plan: TBD    Network Utilization Review Department  ATTENTION: Please call with any questions or concerns to [phone] and carefully listen to the prompts so that you are directed to the right person   All voicemails are confidential   [facility] all requests for admission clinical reviews, approved or denied determinations and any other requests to dedicated fax number below belonging to the campus where the patient is receiving treatment   List of dedicated fax numbers for the Facilities:  [address] [place] [address] DENIALS (Administrative/Medical Necessity) [phone]   [address] (Maternity/NICU/Pediatrics) [phone] [address]  [address] [address] [address] [facility] [facility] [place] [address] [phone]

## 2022-09-06 NOTE — PROGRESS NOTES
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 vascular sheath was placed  The Inari Flowtriever device was advanced into the right atrium  A real-time transesophageal echocardiography performed by the department of the anesthesiology confirmed engaged retrieved for device  The extirpation of matter was performed via the catheter right atrium  Using a pigtail catheter access was maintained pulmonary artery pulmonary pressures were obtained through the sheath  The right main pulmonary artery was accessed and the flow retriever device advanced into the right pulmonary artery  Extirpation of matter was performed via the Inari catheter in the right pulmonary artery  Imaging was obtained of the right pulmonary artery following  mechanical thrombectomy was performed  The left main pulmonary artery was accessed and the flow retriever device and advanced into the left pulmonary artery  Extirpation of matter was performed via the Inari catheter in the left pulmonary artery  Imaging of the left pulmonary artery following mechanical thrombectomy was performed    Pressure measurements were obtained through the sheath following the extirpation of matter  The sheath was removed and hemostasis was achieved using the Inari woggle closure device  Patient is transferred for to the critical care service for overnight monitoring  Patient tolerated the procedure and left the IR department in guarded condition  Dr Yunior Dennis and Dr Kevin Porter performed the procedure  Findings: Using ultrasound guidance, the right common femoral vein was cannulated using Seldinger technique  The right common femoral vein was evaluated as a potential access site  The right common femoral vein was patent, and free of thrombus  Static images of the vessel was obtained  Visualization of real time needle entry into the vessel was obtained  Thrombus was evacuated from the right atrium the right main pulmonary artery and left main pulmonary artery  Imaging of the right main pulmonary artery demonstrated some residual thrombus in the bronchus intermedius with good perfusion in the upper lobe  Imaging of the left main pulmonary artery demonstrated some residual thrombus in a middle lobe branch with perfusion noted Pressure pre 32/17 (23); post 37/19 (28)     Impression: Impression: Successful extirpation of matter from the right and left pulmonary arteries and thrombus in transit in the right atrium using the Inari Flowtriever device Workstation performed: [id number]     Procedure: Echo follow up/limited w/ contrast if indicated    Result Date: 9/5/2022  Narrative: Jeanette Sweet  Left Ventricle: Left ventricular cavity size is normal  Wall thickness is normal  The left ventricular ejection fraction is 50%  Systolic function is mildly reduced  There is mild global hypokinesis    Right Ventricle: Right ventricular cavity size is normal  Systolic function is moderately reduced    Right Atrium: There is a large multilobular thrombus in the cavity    Mitral Valve: There is mild regurgitation  Dr Pranav Drake, the MICU resident was made aware immediately       Counseling / Coordination of Care  Reviewed case with neurology attending, history and physical examination, labs and imaging completed, plan of care as per attending physician  Please see attestation for further details

## 2022-09-06 NOTE — SEDATION DOCUMENTATION
Right Atrial and PE Thrombectomy completed  Right groin CF vein puncture closed with flow Stasis device  Flow Stasis to be removed tomorrow  Transferred back to MICU bed 12 via bed with This RN and MICU RN and Respiratory

## 2022-09-06 NOTE — CONSULTS
Vancomycin IV Pharmacy-to-Dose Consultation    Juarez Ayala is a 57 y o  male who was receiving vancomycin IV with management by the Pharmacy Consult service  The patient's vancomycin therapy has been discontinued  Thank you for allowing us to take part in this patient's care  Pharmacy will sign-off at this point; please call if there are any questions        Pierce Trammell, PharmD, [address] Pharmacist   [phone]

## 2022-09-07 ENCOUNTER — TELEPHONE (OUTPATIENT)
Dept: RADIOLOGY | Facility: HOSPITAL | Age: 62
End: 2022-09-07

## 2022-09-07 NOTE — NUTRITION
09/07/22 1224   Biochemical Data,Medical Tests, and Procedures   Biochemical Data/Medical Tests/Procedures Lab values reviewed; Meds reviewed   Recommendations/Interventions   Summary PEA arrest x 2 min x2 9/5  intubated and brought to ICU  Interventions/Recommendations Adjust EN/ PN   Recommendations to Provider 1  Adjust EN to Vital AF 1 2 at 60 mL/hr  Start at 10 mL and advance by 10 mL q 4 hrs  Provides: 1440 mL TV, 1728 kcal, 108g protein, 1168 mL free water  2  Adjust FWF to 150 mL q 4 hrs   ( pt will receive a total of 2068 mL free water between EN formula and FWF regimen)   Consult and RD protocol appreciated
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
## 2022-09-07 NOTE — PROGRESS NOTES
Chart reviewed in detail  D/w MICU attending  Patient known to me with right thalamic ICH 8/26  Now s/p bradycardic arrest in setting of bilateral PE and RA clot in transit s/p thrombectomy  He remains intubated but now off vasopressors  His most recent CT head 9/6/22 shows stable appearance of right thalamic bleed and dependent lateral ventricle blood products  He remains at high risk of thromboembolism and death   His bleed is ~ 15days old  He is currently hemodynamically stable  Will start iv heparin ACS protocol without a bolus  D/c sq heparin  Will recheck CT head when PTT therapeutic    Cc time-32mins

## 2022-09-07 NOTE — PROGRESS NOTES
Spiritual Care Progress Note    2022  Patient: Dayana Otto : 1960  Admission Date & Time: 2022 1220  MRN: [id number] [facility]: [id number]      Fr Vanesa Carranza visited pt to provide [facility] of the [address] and prayer  Chaplains remain available                 Chaplaincy Interventions Utilized:   Exploration: Explored spiritual needs & resources    Collaboration: Facilitated respect for spiritual/cultural practice during hospitalization    Relationship Building: Cultivated a relationship of care and support    Ritual: Provided prayer and Provided ritual       22 1046   Clinical Encounter Type   Visited With Patient   Routine Visit Follow-up   Referral From [address] [place] [address] of [facility]ick-Anointing Anointed   Sacrament Other Other (Comment)  (Jacek Farrell)

## 2022-09-07 NOTE — OCCUPATIONAL THERAPY NOTE
OT CANCEL NOTE    Pt chart reviewed  Pt is currently intubated/sedated and not appropriate to engage in skilled OT services at this time  Will continue to follow pt on caseload and see pt when medically stable and as clinically appropriate         09/07/22 0807   OT Last Visit   OT Visit Date 09/07/22   Note Type   Note Type Cancelled Session   Cancel Reasons Intubated/sedated       Lorin Barlow MS, OTR/L

## 2022-09-07 NOTE — RESPIRATORY THERAPY NOTE
RT Ventilator Management Note  Aura Howell 57 y o  male MRN: [id number]  Unit/Bed#: [facility] 12 Encounter: [id number]      Daily Screen         9/6/2022  0814 9/7/2022  0737          Patient safety screen outcome[de-identified] Failed Failed (P)       Not Ready for Weaning due to[de-identified] Underline problem not resolved Underline problem not resolved (P)                 Physical Exam:   Assessment Type: Assess only  General Appearance: Sleeping  Respiratory Pattern: Assisted  Chest Assessment: Chest expansion symmetrical  Bilateral Breath Sounds: Clear, Diminished  O2 Device: Vent      Resp Comments: (P) Pt comfortable and doing well on current settings, no vent changes made at this time
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
## 2022-09-07 NOTE — RESPIRATORY THERAPY NOTE
RT Ventilator Management Note  Lc Estrella 57 y o  male MRN: [id number]  Unit/Bed#: [facility] 12 Encounter: [id number]      Daily Screen         9/5/2022  1127 9/6/2022  0814          Patient safety screen outcome[de-identified] Failed Failed      Not Ready for Weaning due to[de-identified] Underline problem not resolved Underline problem not resolved                Physical Exam:   Assessment Type: Assess only  Bilateral Breath Sounds: Clear      Resp Comments: Pt  doing well on CMV  No changes throughout the night

## 2022-09-08 PROBLEM — Z71.89 GOALS OF CARE, COUNSELING/DISCUSSION: Status: ACTIVE | Noted: 2022-01-01

## 2022-09-08 PROBLEM — Z86.74 H/O CARDIAC ARREST: Status: ACTIVE | Noted: 2022-01-01
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
## 2022-09-08 NOTE — QUICK NOTE
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

## 2022-09-08 NOTE — PROGRESS NOTES
On-Call Telephone Note    Contacted by Nicole Palomares  Chana Hadleyjad 57 y o  male MRN: [id number]  Unit/Bed#: MICU 11 Encounter: [id number]    Per provider report, patient had cardiac arrest with resuscitation  Was GCS 3   Slow neuro improvement currently  Available past medical history,social history, surgical history, medication list, drug allergies and review of systems were reviewed  /66 (BP Location: Right arm)   Pulse (!) 110   Temp 99 8 °F (37 7 °C)   Resp 19   Ht 5' 4" (1 626 m)   Wt 71 1 kg (156 lb 12 oz)   SpO2 100%   BMI 26 91 kg/m²        Imaging personally reviewed  Report also reviewed  There has been no change in ventricle size  There has been expected evolution of the Right thalamic hemorrhagic infarct  There is expected layering of IVH in occipital horns  Transependymal absorption is more easily demonstrated on MRI than CT, but has been present from the onset  It may be slightly worse than approx 1 week ago, but hydrocephalus is not the cause of this patient's neurologic deficit    Assessment and Plan  1  OK to continue heparin from a neurosurgical perspective  Slight increase risk of intracranial hemorrhage worsening is smaller than potential further medical/cardiac issues without heparin  2  No plans for intervention at this time  3  Repeat head CT in 1 week, sooner if neurologic exam declines  4  In the absence of seizure activity, no indication for antiepileptics at more than 1 week post bleed  5  Reconsult as needed    All questions answered  A total of 35 minutes was spent discussing the presentation, physical exam and formulating a management plan with the provider

## 2022-09-08 NOTE — PROGRESS NOTES
Spiritual Care Progress Note    2022  Patient: Tremaine Henry : 1960  Admission Date & Time: 2022 1220  MRN: [id number] CSN: [id number]      [facility] visited with family and patient while doing rounds in the MICU (receiving referral from nurse)   has encountered wife, Otilio Blas, several times in the hallway and elevators and was able to open a conversation surrounding the patient's condition and prognosis  Patient is under sedation and currently intubated, so conversation occurred solely with the wife and daughter (Marisel (sp?))  Family is not currently connected to a tk community but would like a [employer] to visit ( has forwarded the request to the [employer])  Family stated that they would need "to make some decisions today"  [facility] offered a comforting presence and provided prayer for the patient and the family  Chaplains remain available  Chaplaincy Interventions Utilized:   Empowerment: Clarified, confirmed, or reviewed information from treatment team , Encouraged self-care, and Provided chaplaincy education    Exploration: Explored hope, Explored emotional needs & resources, and Explored spiritual needs & resources    Collaboration: Consulted with interdisciplinary team    Relationship Building: Cultivated a relationship of care and support and Listened empathically    Ritual: Provided prayer      Chaplaincy Outcomes Achieved:  Emotional resources utilized, Expressed gratitude, Expressed intermediate hope, Identified meaningful connections, Tearfully processed emotions, and Verbally processed emotions       22 1100   Clinical Encounter Type   Visited With Family   Routine Visit Follow-up   Referral To ; Other (Comment)  ([facility])   Gnosticist Encounters   Gnosticist Needs Prayer   Family Spiritual Encounters   Family Participation in Care 5   Family Support During Treatment 5   Caregiver-Patient Relationship 5
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
## 2022-09-08 NOTE — RESPIRATORY THERAPY NOTE
RT Ventilator Management Note  Fabrizio Mcleod 57 y o  male MRN: [id number]  Unit/Bed#: [facility] 11 Encounter: [id number]      Daily Screen         9/7/2022  0737 9/8/2022  0814          Patient safety screen outcome[de-identified] Failed Passed      Not Ready for Weaning due to[de-identified] Underline problem not resolved --      Spont breathing trial % for 30 min: -- Yes      Spont breathing trial outcome[de-identified] -- Passed                Physical Exam:   Assessment Type: (P) Assess only  Respiratory Pattern: Assisted  Chest Assessment: Chest expansion symmetrical  Bilateral Breath Sounds: Diminished  Cough: None  Suction: (P) ET Tube      Resp Comments: (P) Pt  placed on Spont  PS 5/6 40%  Will continue to monitor   BS decreased clear

## 2022-09-08 NOTE — OCCUPATIONAL THERAPY NOTE
OT CANCEL NOTE    Pt chart reviewed  Pt is currently intubated/sedated and not appropriate to engage in skilled OT services at this time  Will continue to follow pt on caseload and see pt when medically stable and as clinically appropriate         09/08/22 0803   OT Last Visit   OT Visit Date 09/08/22   Note Type   Note Type Cancelled Session   Cancel Reasons Intubated/sedated       Nikolay Perez MS, OTR/L
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
## 2022-09-08 NOTE — PROGRESS NOTES
Progress Note - urology  Lc Estrella 57 y o  male MRN: [id number]  Unit/Bed#: MICU 11 Encounter: [id number]    Assessment & Plan:    Gross hematuria:  -urology originally consulted for difficult Sandoval catheter placement in the setting of phimosis, a 12 French urethral Sandoval catheter was inserted without evidence of trauma or gross hematuria post Sandoval catheter insertion on 09/06   -per primary team asked urology to re-evaluate again today due to continued hemoglobin dropped of 10 from prior 12, although hemoglobin 10 6 yesterday in 10 today appears to be stable  Recommended continuing to trend  Transfuse as needed  No plans for  surgical intervention as patient is in critical condition  Hematuria mild  Manual irrigation reveals immediate clearing to light pink urine  Recommend manual irrigation for the next 48-72 hours Q 4  Patient also on heparin drip  Degree of hematuria should be characterized by active drainage from Sandoval catheter in the catheter tubing not the Sandoval catheter drainage bag    -recommend Proscar 5 mg daily once patient is able to take oral medication for prostatic bleeding  -CT scan on admission revealed circumferential bladder wall thickening likely on the basis of outlet obstruction, prostatomegaly protruding into the bladder base   -patient's wife at bedside today, discussed patient's core/urologic care, family history of prostate cancer in patient's father, patient does not follow with a urologist   Recommend outpatient follow-up with Urology  Would not recommend inpatient PSA in the setting of recent  tract manipulation and current critical illness which can falsely elevate PSA  No further  surgical intervention required this admission  should patient's hematuria worsened or is without improvement within the next 48-72 hours would reach out to Urology for further evaluation       Subjective/Objective   Chief Complaint:  Patient intubated unable to provide subjective information    Subjective:  Patient's wife at bedside  Discussed patient's progress with her  And current plan  Patient intubated    Objective:     Blood pressure 149/66, pulse (!) 114, temperature 99 8 °F (37 7 °C), resp  rate 20, height 5' 4" (1 626 m), weight 71 1 kg (156 lb 12 oz), SpO2 99 %  ,Body mass index is 26 91 kg/m²  Intake/Output Summary (Last 24 hours) at 9/8/2022 1423  Last data filed at 9/8/2022 1201  Gross per 24 hour   Intake 2146 04 ml   Output 1365 ml   Net 781 04 ml       Invasive Devices  Report    Central Venous Catheter Line  Duration           CVC Central Lines 09/05/22 3 days          Peripheral Intravenous Line  Duration           Peripheral IV 09/05/22 Distal;Left;Upper;Ventral (anterior) Arm 3 days    Peripheral IV 09/05/22 Right Hand 2 days          Arterial Line  Duration           Arterial Line 09/05/22 Radial 3 days          Drain  Duration           NG/OG/Enteral Tube Orogastric Center mouth 3 days    Urethral Catheter 3 days          Airway  Duration           ETT  Cuffed 7 5 mm 3 days              Physical Exam  Constitutional:       General: He is not in acute distress  Appearance: He is ill-appearing and toxic-appearing  He is not diaphoretic  HENT:      Head: Normocephalic and atraumatic  Right Ear: External ear normal       Left Ear: External ear normal       Nose: Nose normal       Mouth/Throat:      Pharynx: Oropharynx is clear  Eyes:      General: No scleral icterus  Conjunctiva/sclera: Conjunctivae normal    Cardiovascular:      Rate and Rhythm: Normal rate and regular rhythm  Pulses: Normal pulses  Heart sounds: No murmur heard  No friction rub  No gallop  Pulmonary:      Effort: Pulmonary effort is normal       Comments: Intubated  Abdominal:      General: Bowel sounds are normal  There is no distension  Tenderness: There is no abdominal tenderness     Genitourinary:     Comments: Urethral Sandoval catheter in place draining a lightly pink tinged colored urine  Manual irrigation continues to revealed light pink urine  Musculoskeletal:         General: Normal range of motion  Cervical back: Normal range of motion  Skin:     General: Skin is warm and dry  Neurological:      General: No focal deficit present  Mental Status: He is oriented to person, place, and time  Psychiatric:         Mood and Affect: Mood normal          Behavior: Behavior normal          Thought Content: Thought content normal          Judgment: Judgment normal            Lab, Imaging and other studies:I have personally reviewed pertinent lab results      Lab Results   Component Value Date    WBC 14 90 (H) 09/08/2022    HGB 10 0 (L) 09/08/2022    HCT 30 1 (L) 09/08/2022    MCV 94 09/08/2022     09/08/2022     Lab Results   Component Value Date    SODIUM 148 (H) 09/08/2022    K 3 2 (L) 09/08/2022     (H) 09/08/2022    CO2 27 09/08/2022    BUN 41 (H) 09/08/2022    CREATININE 1 04 09/08/2022    GLUC 300 (H) 09/08/2022    CALCIUM 7 7 (L) 09/08/2022       VTE Pharmacologic Prophylaxis: Heparin  VTE Mechanical Prophylaxis: sequential compression device      Bailey Jenkins PA-C

## 2022-09-08 NOTE — UTILIZATION REVIEW
Continued Stay Review    Date: 09/07/2022                        Current Patient Class: IP  Current Level of Care: Critical Care    HPI:61 y o  male initially admitted on 08/26/2022    Assessment/Plan: Pt remains intubated and sedated on MV  He had a successful right atrial and PE thrombectomy 9/5  Patient alterated between requiring vassopressors to maintain perfusion or Cardene drip to keep blood pressure within acceptable ranges  Plan for vEEG  MRI brain after  SBP goal 110-140  Restart ASA today  Tylenol and cooling blanket for fevers  Cont provigil  Cont TD  Sputum cx  Cont IV abx  DVT ppx  On exam, GCS 5, eyes on his own with painful stimualtion, withdraws to pain, +corneal reflex  Febrile  L radial [place], Fem CVC      Vital Signs:   Date/Time Temp Pulse Resp BP MAP (mmHg) Arterial Line BP MAP SpO2 FiO2 (%) O2 Device   09/07/22 1630 100 5 °F (38 1 °C) 102 16 -- -- 174/64 92 mmHg 100 % -- --   09/07/22 1600 -- 102 18 -- -- 162/56 86 mmHg 100 % -- --   09/07/22 1551 -- -- -- -- -- -- -- 100 % -- --   09/07/22 1530 -- 102 16 -- -- 134/50 74 mmHg 100 % -- --   09/07/22 1500 -- 108 Abnormal  22 -- -- 168/60 92 mmHg 100 % -- --   09/07/22 1430 -- 108 Abnormal  18 -- -- 134/54 76 mmHg 100 % -- --   09/07/22 1400 -- 108 Abnormal  15 -- -- 128/54 74 mmHg 100 % -- --   09/07/22 1330 -- 106 Abnormal  15 -- -- 120/52 70 mmHg 100 % --        Pertinent Labs/Diagnostic Results:   09/06  CT head: Stable appearance of the brain to yesterday  Right parenchymal hemorrhage with mild obstructive hydrocephalus and intraventricular hemorrhage  Details above    CXR: No acute cardiopulmonary disease  09/07 MRI brain:   Stable size of right thalamic hematoma with mass effect on the 3rd ventricle resulting in hydrocephalus  The lateral ventricular size is similar to the prior study    Transependymal flow CSF has worsened from the prior study      There is new diffusion abnormality symmetrically in the bilateral caudate head and body as well as in the  body and tail of the hippocampus bilaterally potentially the sequela of seizure activity  Symmetric infarcts in these regions would be   significantly less likely  Date and Time Eye Opening Best Verbal Response Best Motor Response [place] Coma Scale Score   09/07/22 2015 1 1 3 5   09/07/22 1655 1 1 3 5   09/07/22 1217 1 1 3 5   09/07/22 0844 1 1 3 5   09/07/22 0400 1 1 3 5   09/07/22 0000 1 1 3 5         Results from last 7 days   Lab Units 09/08/22 0447 09/07/22  2241 09/07/22  1148 09/07/22  0449 09/06/22  0448 09/05/22  1904 09/05/22  1111   WBC Thousand/uL 14 90* 14 75*  --  16 84* 18 02*   < > 15 80*   HEMOGLOBIN g/dL 10 0* 10 6* 10 9* 10 8* 12 4   < > 15 7   I STAT HEMOGLOBIN   --   --   --   --   --    < >  --    HEMATOCRIT % 30 1* 30 9* 32 8* 32 1* 35 7*   < > 47 3   HEMATOCRIT, ISTAT   --   --   --   --   --    < >  --    PLATELETS Thousands/uL 179 193  --  181 192   < > 306   NEUTROS ABS Thousands/µL 13 08*  --   --  14 99* 15 91*   < >  --    BANDS PCT %  --   --   --   --   --   --  4    < > = values in this interval not displayed           Results from last 7 days   Lab Units 09/08/22 0447 09/07/22  1148 09/07/22  0449 09/06/22  1225 09/06/22  0448 09/05/22  2105 09/05/22 2015 09/05/22  1904 09/03/22  0943 09/02/22  0516   SODIUM mmol/L 148* 150* 150* 149* 147 146  --   --    < > 139   POTASSIUM mmol/L 3 2* 3 5 3 5 4 0 3 4* 3 4*  --   --    < > 3 8   CHLORIDE mmol/L 115* 118* 119* 119* 117* 116*  --   --    < > 105   CO2 mmol/L 27 25 25 25 25 22  --   --    < > 28   CO2, I-STAT mmol/L  --   --   --   --   --   --  23 27   < >  --    ANION GAP mmol/L 6 7 6 5 5 8  --   --    < > 6   BUN mg/dL 41* 46* 45* 47* 51* 61*  --   --    < > 30*   CREATININE mg/dL 1 04 1 14 1 22 1 12 1 23 1 77*  --   --    < > 0 97   EGFR ml/min/1 73sq m 76 68 63 70 62 40  --   --    < > 83   CALCIUM mg/dL 7 7* 7 9* 7 6* 7 9* 8 3 7 4*  --   --    < > 8 6   CALCIUM, IONIZED mmol/L  --   --   --   -- 1  12 1 03*  --   --   --   --    CALCIUM, IONIZED, ISTAT mmol/L  --   --   --   --   --   --  1 01* 1 08*  --   --    MAGNESIUM mg/dL  --   --   --   --  2 7* 2 7*  --   --   --  2 6   PHOSPHORUS mg/dL  --   --  3 8 3 7 1 8* 5 2*  --   --   --  3 3    < > = values in this interval not displayed       Results from last 7 days   Lab Units 09/05/22  2105   AST U/L 291*   ALT U/L 360*   ALK PHOS U/L 126*   TOTAL PROTEIN g/dL 5 8*   ALBUMIN g/dL 2 8*   TOTAL BILIRUBIN mg/dL 1 48*     Results from last 7 days   Lab Units 09/08/22  1608 09/08/22  1104 09/08/22  0002 09/07/22  1756 09/07/22  1143 09/07/22  0744 09/07/22  0555 09/06/22  2042 09/06/22  1715 09/06/22  1155 09/06/22  0752 09/06/22  0608   POC GLUCOSE mg/dl 353* 312* 158* 264* 306* 276* 291* 254* 263* 246* 204* 223*     Results from last 7 days   Lab Units 09/08/22  0447 09/07/22  1148 09/07/22  0449 09/06/22  1225 09/06/22  0448 09/05/22  2105 09/05/22  1111 09/05/22  0803 09/04/22  0532 09/03/22  0943 09/02/22  0516   GLUCOSE RANDOM mg/dL 300* 260* 308* 269* 243* 272* 201* 167* 111 163* 151*     Results from last 7 days   Lab Units 09/06/22  1225 09/06/22  0910 09/06/22  0451   PH ART  7 449 7 506* 7 505*   PCO2 ART mm Hg 34 2* 30 9* 30 5*   PO2 ART mm Hg 119 1 234 0* 205 4*   HCO3 ART mmol/L 23 2 23 9 23 5   BASE EXC ART mmol/L -0 3 1 5 1 2   O2 CONTENT ART mL/dL 17 1 18 3 18 4   O2 HGB, ARTERIAL % 97 1* 98 5* 98 0*   ABG SOURCE  Line, Arterial Line, Arterial Line, Arterial         Results from last 7 days   Lab Units 09/05/22  2015 09/05/22  1904   I STAT BASE EXC mmol/L -3* 1   I STAT O2 SAT % 100*  --    ISTAT PH ART  7 361 7 411   I STAT ART PCO2 mm HG 38 8 40 0   I STAT ART PO2 mm  0* >400 0*   I STAT ART HCO3 mmol/L 22 0 25 4         Results from last 7 days   Lab Units 09/05/22  1550 09/05/22  1320 09/05/22  1111   HS TNI 0HR ng/L  --   --  367*   HS TNI 2HR ng/L  --  2,518*  --    HSTNI D2 ng/L  --  2,151*  --    HS TNI 4HR ng/L 2,302*  --   -- HSTNI D4 ng/L 1,935*  --   --          Results from last 7 days   Lab Units 09/08/22  1132 09/08/22  0445 09/07/22  2241 09/07/22  0449 09/06/22  0448   PROTIME seconds  --   --  16 7* 17 9* 18 3*   INR   --   --  1 33* 1 45* 1 49*   PTT seconds 82* 93* 34  --   --          Results from last 7 days   Lab Units 09/06/22  0448 09/05/22  1111 09/05/22  0212   PROCALCITONIN ng/ml 7 41* 0 23 0 14     Results from last 7 days   Lab Units 09/06/22  1524 09/06/22  0003 09/05/22  2105 09/05/22  1320 09/05/22  1111   LACTIC ACID mmol/L 1 4 1 5 2 9* 2 6* 6 0*       Results from last 7 days   Lab Units 09/05/22  1551   STREP PNEUMONIAE ANTIGEN, URINE  Negative   LEGIONELLA URINARY ANTIGEN  Negative       Results from last 7 days   Lab Units 09/07/22  1630 09/06/22  1539 09/06/22  1524 09/05/22  1156 09/05/22  0154   BLOOD CULTURE   --  No Growth at 24 hrs  No Growth at 24 hrs  No Growth at 72 hrs  No Growth at 72 hrs  No Growth at 72 hrs  No Growth at 72 hrs     SPUTUM CULTURE  Culture too young- will reincubate  --   --   --   --    GRAM STAIN RESULT  4+ Polys*  2+ Budding Yeast with Pseudomycelia*  Rare Gram positive rods*  --   --   --   --                  Medications:   Scheduled Medications:  amLODIPine, 5 mg, Oral, Daily  aspirin, 81 mg, Oral, Daily  atorvastatin, 40 mg, Oral, After Dinner  cefTRIAXone, 2,000 mg, Intravenous, Q24H  chlorhexidine, 15 mL, Mouth/Throat, Q12H LUI  chlorthalidone, 25 mg, Oral, Daily  citalopram, 10 mg, Oral, Daily  insulin lispro, 4-20 Units, Subcutaneous, Q6H LUI  modafinil, 100 mg, Oral, Daily  pantoprazole, 40 mg, Intravenous, Q24H LUI  polyethylene glycol, 17 g, Oral, Daily  senna-docusate sodium, 2 tablet, Oral, BID      Continuous IV Infusions:  heparin (porcine), 3-20 Units/kg/hr (Order-Specific), Intravenous, Titrated  niCARdipine, 1-15 mg/hr, Intravenous, Titrated  norepinephrine, 1-30 mcg/min, Intravenous, Titrated      PRN Meds:  acetaminophen, 975 mg, Oral, Q8H PRN 09/07 x 1  bisacodyl, 10 mg, Rectal, Daily PRN  fentanyl citrate (PF), 25 mcg, Intravenous, Q2H PRN  glycerin-hypromellose-, 1 drop, Both Eyes, Q6H PRN  heparin (porcine), 2,000 Units, Intravenous, Q1H PRN  heparin (porcine), 4,000 Units, Intravenous, Q1H PRN  hydrALAZINE, 10 mg, Intravenous, Q4H PRN  labetalol, 20 mg, Intravenous, Q4H PRN 09/07 x 1  ondansetron, 4 mg, Intravenous, Q6H PRN        Discharge Plan: D    Network Utilization Review Department  ATTENTION: Please call with any questions or concerns to [phone] and carefully listen to the prompts so that you are directed to the right person  All voicemails are confidential   Monica Dial all requests for admission clinical reviews, approved or denied determinations and any other requests to dedicated fax number below belonging to the campus where the patient is receiving treatment   List of dedicated fax numbers for the Facilities:  [address] DENIALS (Administrative/Medical Necessity) [phone]   [address] (Maternity/NICU/Pediatrics) [phone]   [address]  [address] [place] [address] [address] [place] [address] [address] [facility] [facility] [phone]

## 2022-09-08 NOTE — PROGRESS NOTES
Pastoral Care Progress Note    2022  Patient: Ameena Patterson : 1960  Admission Date & Time: 2022 1220  MRN: [id number] [facility]: [id number]                     Chaplaincy Interventions Utilized:   Empowerment: Clarified, confirmed, or reviewed information from treatment team , Provided anticipatory guidance, Provided anxiety containment, and Provided chaplaincy education    Exploration: Explored emotional needs & resources and Explored spiritual needs & resources    Ritual: Provided prayer    Chaplaincy Outcomes Achieved:  Expressed gratitude    Spiritual Coping Strategies Utilized:   Spiritual practices       22 1700   Clinical Encounter Type   Visited With Family   Routine Visit Follow-up   Referral From Family   Referral To    Jew Encounters   Jew Needs Prayer   Patient Spiritual Encounters   Spiritual Encounter Notes Met family in MICU family area  Family is extremely anxious due to Nick's deteriorating condition  They are not displaying any real hope for recovery, and that is understandable considering the nature of the injury to his brain from the bleed  Prayed with the family and reinforced their understanding of PC availability

## 2022-09-08 NOTE — PROGRESS NOTES
Pastoral Care Progress Note    2022  Patient: Zulma Danielle : 1960  Admission Date & Time: 2022 1220  MRN: [id number] CSN: [id number]         22 1500   Clinical Encounter Type   Visited With Patient   Routine Visit Follow-up   Jewish Encounters   Jewish Needs Prayer  (Fr Hein gave prayer and blessing)

## 2022-09-08 NOTE — PHYSICAL THERAPY NOTE
PT orders received  Chart reviewed  Pt intubated/sedated and not appropriate for PT  Will hold  will continue to follow  Denney Lundborg, PT, DPT     09/08/22 3642   PT Last Visit   PT Visit Date 09/08/22   Note Type   Note Type Cancelled Session   Cancel Reasons Intubated/sedated

## 2022-09-08 NOTE — RESPIRATORY THERAPY NOTE
RT Ventilator Management Note  Violeta Joseph 57 y o  male MRN: [id number]  Unit/Bed#: [facility] 12 Encounter: [id number]      Daily Screen         9/6/2022  0814 9/7/2022  0737          Patient safety screen outcome[de-identified] Failed Failed      Not Ready for Weaning due to[de-identified] Underline problem not resolved Underline problem not resolved                Physical Exam:   Assessment Type: Assess only  General Appearance: Sleeping  Respiratory Pattern: Assisted  Chest Assessment: Chest expansion symmetrical  Bilateral Breath Sounds: Diminished  Cough: None  O2 Device: VENT      Resp Comments: (P) No adverse events to report overnight  09/08/22 0345   Respiratory Assessment   Respiratory Pattern Assisted   Chest Assessment Chest expansion symmetrical   Bilateral Breath Sounds Diminished   Cough None   Resp Comments No adverse events to report overnight     Vent Information   Vent ID [id number]   Vent type Llamas G5   Llamas C3/G5 Vent Mode (S)CMV   $ Vent Daily Charge-Subsequent Yes   $ Pulse Oximetry Spot Check Charge Completed   SpO2 100 %   (S)CMV Settings   Resp Rate (BPM) 14 BPM   VT (mL) 350 mL   FIO2 (%) 40 %   PEEP (cmH2O) 6 cmH2O   Insp Time (%) 1 %   Flow Trigger (LPM) 5   Humidification Heater   Heater Temperature (Set) 95 °F (35 °C)   (S)CMV Actuals   Resp Rate (BPM) 18 BPM   VT (mL) 366   MV 6 8   MAP (cmH2O) 9 7 cmH2O   Peak Pressure (cmH2O) 17 cmH2O   Heater Temperature (Obs) 95 °F (35 °C)   (S)CMV Alarms   High Peak Pressure (cmH2O) 40   Low Pressure (cmH2O) 5 cm H2O   High Resp Rate (BPM) 35 BPM   Low Resp Rate (BPM) 8 BPM   High MV (L/min) 18 L/min   Low MV (L/min) 5 L/min   Apnea Time (s) 20 S   Maintenance   Water bag changed No   Circuit changed No

## 2022-09-09 ENCOUNTER — TELEPHONE (OUTPATIENT)
Dept: RADIOLOGY | Facility: HOSPITAL | Age: 62
End: 2022-09-09

## 2022-09-09 NOTE — PROCEDURES
Insert PICC line    Date/Time: 9/9/2022 10:44 AM  Performed by: Roseline Ruiz RN  Authorized by: Freddy Taylor MD     Patient location:  Bedside  Other Assisting Provider: Yes (comment)    Consent:     Consent obtained:  Written and verbal (physician obtained consent from pt's wife)    Consent given by:  Spouse    Procedural risks discussed: MD instructed  Universal protocol:     Procedure explained and questions answered to patient or proxy's satisfaction: yes      Relevant documents present and verified: yes      Test results available and properly labeled: yes      Radiology Images displayed and confirmed  If images not available, report reviewed: yes      Required blood products, implants, devices, and special equipment available: yes      Site/side marked: yes      Immediately prior to procedure, a time out was called: yes      Patient identity confirmed:  Arm band and hospital-assigned identification number  Pre-procedure details:     Hand hygiene: Hand hygiene performed prior to insertion      Sterile barrier technique: All elements of maximal sterile technique followed      Skin preparation:  ChloraPrep    Skin preparation agent: Skin preparation agent completely dried prior to procedure    Indications:     PICC line indications: medications requiring central line    Sedation:     Sedation type: Other (comment)  Anesthesia (see MAR for exact dosages):      Anesthesia method:  Local infiltration    Local anesthetic:  Lidocaine 1% w/o epi (2 ml)  Procedure details:     Location:  Basilic    Vessel type: vein      Laterality:  Right    Site selection rationale:  Left arm flaccid    Approach: percutaneous technique used      Patient position:  Flat    Procedural supplies:  Triple lumen    Catheter size:  5 Fr    Landmarks identified: yes      Ultrasound guidance: yes      Ultrasound image availability:  Not saved    Sterile ultrasound techniques: Sterile gel and sterile probe covers were used Number of attempts:  1    Successful placement: yes      Vessel of catheter tip end:  Sherlock 3CG confirmed (Coy Thomas to use, reported to [facility])    Total catheter length (cm):  46    Catheter out on skin (cm):  0    Max flow rate:  999 ml / hr    Arm circumference:  27  Post-procedure details:     Post-procedure:  Dressing applied and securement device placed    Assessment:  Blood return through all ports and free fluid flow    Post-procedure complications: none      Patient tolerance of procedure:   Tolerated well, no immediate complications    Observer: Yes      Observer name:  Ace Loy, Infusion Tech

## 2022-09-09 NOTE — ACP (ADVANCE CARE PLANNING)
Record of Family Meeting - Palliative and Supportive Care   Carlyn Bamberger 57 y o  male [id number]      Recommendations and Plan:  Family to discuss comfort measures with patient's sister who lives remotely  Agreeable to moving towards comfort measures on their timeline  Request  for last rites and Fr Padillaadiel Lucero at bedside  Request a do not resuscitate code status  Further are not in favor of escalations in care given evaluation by neurology  Support provided and recommend following up with family regarding timeline for compassionate extubation  A family meeting was held for Mr Pallavi Busby  This meeting was necessary for determine the appropriate course of treatment  Time of Meetin:40  Meeting Location: MICU conference room  Participants: patient's spouse, son, daughter, and sister  Dr Delores Marquez from ICU team  Mae Rodriguez team      Dr Johana Richard, Ms  Megha Tidwell from Neurologyjoined for later meeting  Gail Arreola MD     Patient Participation: unable    Patient Support System: family     Advanced Directive of POLST available: no    Topics of Discussion:  Reviewed medical update  Initial goals of care conference focused on details of comfort measures  Family has spent time discussing among themselves and reflecting on quality of life measures for patient  Initial meeting concluded while neurology evaluation was awaited  Returned shortly thereafter accompanied by neurology team   Discussed prognosis including best case scenario  Summarily all parties in agreement that Mr Pallavi Busby' best possible scenario would still be a quality of life he would not accept  All questions answered  Other Content of Meeting:  Patient identified as [employer]  Family requests  be present for anointing of the sick and last rites  Time Involved in Meetin+, beginning at approximately  1330 and ending at approximately 1440       Gail Arreola MD   Palliative and Supportive [facility]  Clinic/Answering Service: [phone]  You can find me on [employer]!

## 2022-09-09 NOTE — NUTRITION
09/09/22 5466   Recommendations/Interventions   Summary Tolerating EN  Loose, watery BMs noted- can hold the senna pt is receiving if this worsens  Hyperglycemia, EN regimen is only providing 159g CHO daily  Insulin drip started today per discussion with RN   Interventions/Recommendations Continue current diet order   Recommendations to Provider 1  Continue Vital AF 1 2 at 60 mL/hr  Provides: 1440 mL TV, 1728 kcal, 108g protein, 1168 mL free water  2  FWF at 150 mL q 4 hrs   ( pt will receive a total of 2068 mL free water between EN formula and FWF regimen)
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
## 2022-09-09 NOTE — PROGRESS NOTES
Progress note - Palliative and Supportive Care   Renata Mccrary 57 y o  male [id number]    Patient Active Problem List   Diagnosis    Acute right thalamic intraparenchymal hemorrhage    Diabetes mellitus ([address] )    Cervical spinal stenosis    Hypertension    Obesity (BMI 30-39  9)    Leukocytosis    H/O cardiac arrest    Goals of care, counseling/discussion     Active issues specifically addressed today include:   Acute right thalamic intraparenchymal hemorrhage   Goals of care, counseling/discussion  H/O cardiac arrest     Plan:  1  Symptom management -    - Will add ativan 0 5mg q1h PRN for anxiety   - hydromorphone 0 5mg q1h PRN for pain/dyspnea to be given in case of transition to comfort measures    2  Goals - See ACP note  Code status changed to level 2     -     Code Status: level 2     Interval history:       Patient opening eyes but otherwise no meaningful signs but unpurposeful in movement  Goals of care as documented       MEDICATIONS / ALLERGIES:     all current active meds have been reviewed and current meds:   Current Facility-Administered Medications   Medication Dose Route Frequency    acetaminophen (TYLENOL) oral suspension 975 mg  975 mg Oral Q8H PRN    amLODIPine (NORVASC) tablet 5 mg  5 mg Oral Daily    aspirin (ECOTRIN LOW STRENGTH) EC tablet 81 mg  81 mg Oral Daily    atorvastatin (LIPITOR) tablet 40 mg  40 mg Oral After Dinner    bisacodyl (DULCOLAX) rectal suppository 10 mg  10 mg Rectal Daily PRN    cefTRIAXone (ROCEPHIN) 2,000 mg in dextrose 5 % 50 mL IVPB  2,000 mg Intravenous Q24H    chlorhexidine (PERIDEX) 0 12 % oral rinse 15 mL  15 mL Mouth/Throat Q12H LUI    chlorthalidone tablet 25 mg  25 mg Oral Daily    citalopram (CeleXA) tablet 10 mg  10 mg Oral Daily    glycerin-hypromellose- (ARTIFICIAL TEARS) ophthalmic solution 1 drop  1 drop Both Eyes Q6H PRN    heparin (porcine) subcutaneous injection 5,000 Units  5,000 Units Subcutaneous Q8H [address]    hydrALAZINE (APRESOLINE) injection 10 mg  10 mg Intravenous Q4H PRN    HYDROmorphone (DILAUDID) injection 0 5 mg  0 5 mg Intravenous Q1H PRN    insulin regular (HumuLIN R,NovoLIN R) 1 Units/mL in sodium chloride 0 9 % 100 mL infusion  0 3-21 Units/hr Intravenous Titrated    labetalol (NORMODYNE) injection 20 mg  20 mg Intravenous Q4H PRN    LORazepam (ATIVAN) injection 0 5 mg  0 5 mg Intravenous Q1H PRN    niCARdipine (CARDENE) 25 mg (STANDARD CONCENTRATION) in sodium chloride 0 9% 250 mL  1-15 mg/hr Intravenous Titrated    norepinephrine (LEVOPHED) 4 mg (STANDARD CONCENTRATION) IV in sodium chloride 0 9% 250 mL  1-30 mcg/min Intravenous Titrated    ondansetron (ZOFRAN) injection 4 mg  4 mg Intravenous Q6H PRN    pantoprazole (PROTONIX) injection 40 mg  40 mg Intravenous Q24H LUI    polyethylene glycol (MIRALAX) packet 17 g  17 g Oral Daily    senna-docusate sodium (SENOKOT S) 8 6-50 mg per tablet 2 tablet  2 tablet Oral BID       Not on File    OBJECTIVE:    Physical Exam  Physical Exam  Vitals and nursing note reviewed  Constitutional:       General: He is not in acute distress  Appearance: He is ill-appearing  He is not toxic-appearing or diaphoretic  HENT:      Head: Normocephalic  Right Ear: External ear normal       Left Ear: External ear normal       Nose: Nose normal       Mouth/Throat:      Comments: ETT in place  Eyes:      General:         Right eye: No discharge  Left eye: No discharge  Cardiovascular:      Rate and Rhythm: Normal rate  Pulmonary:      Comments: vented  Abdominal:      General: There is no distension  Palpations: Abdomen is soft  Tenderness: There is no abdominal tenderness  There is no guarding  Musculoskeletal:         General: No deformity  Right lower leg: Edema present  Left lower leg: Edema present  Skin:     General: Skin is warm and dry  Coloration: Skin is not jaundiced or pale     Neurological:      Comments: Unresponsive to voice/exam         Lab Results:   I have personally reviewed pertinent labs  , CBC:   Lab Results   Component Value Date    WBC 12 57 (H) 09/09/2022    HGB 9 3 (L) 09/09/2022    HCT 27 9 (L) 09/09/2022    MCV 92 09/09/2022     09/09/2022    MCH 30 6 09/09/2022    MCHC 33 3 09/09/2022    RDW 13 1 09/09/2022    MPV 12 0 09/09/2022    NRBC 0 09/09/2022   , CMP:   Lab Results   Component Value Date    SODIUM 146 09/09/2022    K 3 6 09/09/2022     (H) 09/09/2022    CO2 28 09/09/2022    BUN 41 (H) 09/09/2022    CREATININE 0 97 09/09/2022    CALCIUM 8 1 (L) 09/09/2022    AST 29 09/09/2022    ALT 79 (H) 09/09/2022    ALKPHOS 104 09/09/2022    EGFR 83 09/09/2022   , BMP:  Lab Results   Component Value Date    SODIUM 146 09/09/2022    K 3 6 09/09/2022     (H) 09/09/2022    CO2 28 09/09/2022    BUN 41 (H) 09/09/2022    CREATININE 0 97 09/09/2022    GLUC 309 (H) 09/09/2022    CALCIUM 8 1 (L) 09/09/2022    AGAP 4 09/09/2022    EGFR 83 09/09/2022       Counseling / Coordination of Care    Total floor / unit time spent today  80+ minutes  Greater than 50% of total time was spent with the patient and / or family counseling and / or coordination of care (13:30-14:40)   A description of the counseling / coordination of care: goals of care, change in code status, support for family, dsicussion with ICU team and also neurology

## 2022-09-09 NOTE — CONSULTS
Podiatry - Consultation    Patient Information:   Jakob Irving 57 y o  male MRN: [id number]  [facility] [id number]  PCP: No primary care provider on file  Date of Admission:  8/26/2022  Date of Consultation: 09/09/22  Requesting Physician: Duglas Aguilar MD      ASSESSMENT:    Jakob Irving is a 57 y o  male with:    1  Onychomycosis  2  Type 2 Diabetes Mellitus   3  Acute right thalamic intraparenchymal hemorrhage    PLAN:    · Mycotic nails debrided x10 with large nail nipper, without incident  · Patient stable from podiatric standpoint, will sign off at this time  Appreciate the consult  · Elevation and offloading on green foam wedges or pillows when non-ambulatory  · Rest of care per primary team   · Will discuss this plan with my attending and update as needed  Nail Debridement  - Mycotic nails were debrided in both length and thickness using large nail nipper   - No immediate complications observed  - Tolerated well by patient  Weightbearing status: Weightbearing as tolerated    SUBJECTIVE:    History of Present Illness:    Jakob Irving is a 57 y o  male who is originally admitted 8/26/2022 due to Acute right thalamic intraparenchymal hemorrhage  Patient has a past medical history of type 2 diabetes, cervical spine stenosis, hypertension, and obesity  We are consulted for mycotic nails on bilateral feet  Review of Systems:    Constitutional: Negative  HENT: Negative  Eyes: Negative  Respiratory: Negative  Cardiovascular: Negative  Gastrointestinal: Negative  Musculoskeletal: negative   Skin:thickened and elongated nails    Neurological: negative    Psych: Negative  Past Medical and Surgical History:     No past medical history on file  Past Surgical History:   Procedure Laterality Date    IR PE ENDOVASCULAR THERAPY  9/5/2022       Meds/Allergies:    No medications prior to admission         Not on File    Social History:     Marital Status: /Civil [employer]    Substance Use History:   Social History     Substance and Sexual Activity   Alcohol Use Not on file     Social History     Tobacco Use   Smoking Status Not on file   Smokeless Tobacco Not on file     Social History     Substance and Sexual Activity   Drug Use Not on file       Family History:    No family history on file  OBJECTIVE:    Vitals:   Blood Pressure: (!) 176/60 (09/09/22 0544)  Pulse: (!) 114 (09/09/22 1200)  Temperature: (!) 103 °F (39 4 °C) (PRN tyelonal is given ) (09/09/22 1021)  Temp Source: Oral (09/09/22 1021)  Respirations: 19 (09/08/22 1800)  Height: 5' 4" (162 6 cm) (09/05/22 1411)  Weight - Scale: 71 7 kg (158 lb 1 1 oz) (09/09/22 0600)  SpO2: 99 % (09/09/22 1200)    Physical Exam:    General Appearance: Alert, cooperative, no distress  HEENT: Head normocephalic, atraumatic, without obvious abnormality  Heart: Normal rate and rhythm  Lungs: Non-labored breathing  No respiratory distress  Abdomen: Without distension  Psychiatric: AAOx3  Lower Extremity:    Vascular:   DP: Right: 2+ Left: 2+  PT: Right: 2+ Left: 2+  CRT < 3 seconds at the digits  +0/4 edema noted at bilateral lower extremities  Pedal hair is present  Skin temperature is WNL bilaterally  Musculoskeletal:  MMT is 0/5 in all muscle compartments bilaterally  Patient was not concious during exam    Passive ROM within normal limits  No gross deformities observed  Dermatological:  - Nails are elongated, thickened and discolored bilaterally with subungual debris on debridement   - No open wounds or lesions  Neurological:  Gross sensation is intact  Light touch is intact  Protective sensation is intact      Clinical Images 09/09/22:    Additional data:     Lab Results: I have personally reviewed pertinent labs including:    Results from last 7 days   Lab Units 09/09/22  0611   WBC Thousand/uL 12 57*   HEMOGLOBIN g/dL 9 3*   HEMATOCRIT % 27 9*   PLATELETS Thousands/uL 211   NEUTROS PCT % 85*   LYMPHS PCT % 6*   MONOS PCT % 5   EOS PCT % 2     Results from last 7 days   Lab Units 09/09/22  0530 09/05/22  2105 09/05/22 2015   POTASSIUM mmol/L 3 6   < >  --    CHLORIDE mmol/L 114*   < >  --    CO2 mmol/L 28   < >  --    CO2, I-STAT mmol/L  --   --  23   BUN mg/dL 41*   < >  --    CREATININE mg/dL 0 97   < >  --    CALCIUM mg/dL 8 1*   < >  --    ALK PHOS U/L 104   < >  --    ALT U/L 79*   < >  --    AST U/L 29   < >  --    GLUCOSE, ISTAT mg/dl  --   --  250*    < > = values in this interval not displayed  Results from last 7 days   Lab Units 09/07/22  2241   INR  1 33*       Cultures: I have personally reviewed pertinent cultures including:    Results from last 7 days   Lab Units 09/08/22  1806 09/07/22  1630 09/06/22  1539 09/06/22  1524 09/05/22  1551 09/05/22  1156 09/05/22  0154   BLOOD CULTURE  Received in Microbiology Lab  Culture in Progress  Received in Microbiology Lab  Culture in Progress  --  No Growth at 48 hrs  No Growth at 48 hrs  --  No Growth at 72 hrs  No Growth at 72 hrs  No Growth After 4 Days  No Growth After 4 Days  SPUTUM CULTURE   --  Culture results to follow  --   --   --   --   --    GRAM STAIN RESULT   --  4+ Polys*  2+ Budding Yeast with Pseudomycelia*  Rare Gram positive rods*  --   --   --   --   --    LEGIONELLA URINARY ANTIGEN   --   --   --   --  Negative  --   --            Imaging: I have personally reviewed pertinent reports in PACS  EKG, Pathology, and Other Studies: I have personally reviewed pertinent reports  Time Spent for Care: 30 minutes  More than 50% of total time spent on counseling and coordination of care as described above  ** Please Note: Portions of the record may have been created with voice recognition software  Occasional wrong word or "sound a like" substitutions may have occurred due to the inherent limitations of voice recognition software   Read the chart carefully and recognize, using context, where substitutions have occurred   **

## 2022-09-09 NOTE — PLAN OF CARE
Problem: MOBILITY - ADULT  Goal: Maintain or return to baseline ADL function  Description: INTERVENTIONS:  -  Assess patient's ability to carry out ADLs; assess patient's baseline for ADL function and identify physical deficits which impact ability to perform ADLs (bathing, care of mouth/teeth, toileting, grooming, dressing, etc )  - Assess/evaluate cause of self-care deficits   - Assess range of motion  - Assess patient's mobility; develop plan if impaired  - Assess patient's need for assistive devices and provide as appropriate  - Encourage maximum independence but intervene and supervise when necessary  - Involve family in performance of ADLs  - Assess for home care needs following discharge   - Consider OT consult to assist with ADL evaluation and planning for discharge  - Provide patient education as appropriate  Outcome: Progressing  Goal: Maintains/Returns to pre admission functional level  Description: INTERVENTIONS:  - Perform BMAT or MOVE assessment daily    - Set and communicate daily mobility goal to care team and patient/family/caregiver     - Collaborate with rehabilitation services on mobility goals if consulted  - Out of bed for toileting  - Record patient progress and toleration of activity level   Outcome: Progressing     Problem: PAIN - ADULT  Goal: Verbalizes/displays adequate comfort level or baseline comfort level  Description: Interventions:  - Encourage patient to monitor pain and request assistance  - Assess pain using appropriate pain scale  - Administer analgesics based on type and severity of pain and evaluate response  - Implement non-pharmacological measures as appropriate and evaluate response  - Consider cultural and social influences on pain and pain management  - Notify physician/advanced practitioner if interventions unsuccessful or patient reports new pain  Outcome: Progressing     Problem: INFECTION - ADULT  Goal: Absence or prevention of progression during hospitalization  Description: INTERVENTIONS:  - Assess and monitor for signs and symptoms of infection  - Monitor lab/diagnostic results  - Monitor all insertion sites, i e  indwelling lines, tubes, and drains  - Monitor endotracheal if appropriate and nasal secretions for changes in amount and color  - Pony appropriate cooling/warming therapies per order  - Administer medications as ordered  - Instruct and encourage patient and family to use good hand hygiene technique  - Identify and instruct in appropriate isolation precautions for identified infection/condition  Outcome: Progressing  Goal: Absence of fever/infection during neutropenic period  Description: INTERVENTIONS:  - Monitor WBC    Outcome: Progressing     Problem: SAFETY ADULT  Goal: Maintain or return to baseline ADL function  Description: INTERVENTIONS:  -  Assess patient's ability to carry out ADLs; assess patient's baseline for ADL function and identify physical deficits which impact ability to perform ADLs (bathing, care of mouth/teeth, toileting, grooming, dressing, etc )  - Assess/evaluate cause of self-care deficits   - Assess range of motion  - Assess patient's mobility; develop plan if impaired  - Assess patient's need for assistive devices and provide as appropriate  - Encourage maximum independence but intervene and supervise when necessary  - Involve family in performance of ADLs  - Assess for home care needs following discharge   - Consider OT consult to assist with ADL evaluation and planning for discharge  - Provide patient education as appropriate  Outcome: Progressing  Goal: Maintains/Returns to pre admission functional level  Description: INTERVENTIONS:  - Perform BMAT or MOVE assessment daily    - Set and communicate daily mobility goal to care team and patient/family/caregiver     - Collaborate with rehabilitation services on mobility goals if consulted  - Record patient progress and toleration of activity level   Outcome: Progressing  Goal: Patient will remain free of falls  Description: INTERVENTIONS:  - Educate patient/family on patient safety including physical limitations  - Instruct patient to call for assistance with activity   - Consult OT/PT to assist with strengthening/mobility   - Keep Call bell within reach  - Keep bed low and locked with side rails adjusted as appropriate  - Keep care items and personal belongings within reach  - Initiate and maintain comfort rounds  - Make Fall Risk Sign visible to staff  - Offer Toileting every 2 Hours, in advance of need  - Initiate/Maintain bedalarm  - Obtain necessary fall risk management equipment:   - Apply yellow socks and bracelet for high fall risk patients  - Consider moving patient to room near nurses station  Outcome: Progressing     Problem: DISCHARGE PLANNING  Goal: Discharge to home or other facility with appropriate resources  Description: INTERVENTIONS:  - Identify barriers to discharge w/patient and caregiver  - Arrange for needed discharge resources and transportation as appropriate  - Identify discharge learning needs (meds, wound care, etc )  - Arrange for interpretive services to assist at discharge as needed  - Refer to Case Management Department for coordinating discharge planning if the patient needs post-hospital services based on physician/advanced practitioner order or complex needs related to functional status, cognitive ability, or social support system  Outcome: Progressing     Problem: Knowledge Deficit  Goal: Patient/family/caregiver demonstrates understanding of disease process, treatment plan, medications, and discharge instructions  Description: Complete learning assessment and assess knowledge base    Interventions:  - Provide teaching at level of understanding  - Provide teaching via preferred learning methods  Outcome: Progressing     Problem: Nutrition/Hydration-ADULT  Goal: Nutrient/Hydration intake appropriate for improving, restoring or maintaining nutritional needs  Description: Monitor and assess patient's nutrition/hydration status for malnutrition  Collaborate with interdisciplinary team and initiate plan and interventions as ordered  Monitor patient's weight and dietary intake as ordered or per policy  Utilize nutrition screening tool and intervene as necessary  Determine patient's food preferences and provide high-protein, high-caloric foods as appropriate       INTERVENTIONS:  - Monitor oral intake, urinary output, labs, and treatment plans  - Assess nutrition and hydration status and recommend course of action  - Evaluate amount of meals eaten  - Assist patient with eating if necessary   - Allow adequate time for meals  - Recommend/ encourage appropriate diets, oral nutritional supplements, and vitamin/mineral supplements  - Order, calculate, and assess calorie counts as needed  - Recommend, monitor, and adjust tube feedings and TPN/PPN based on assessed needs  - Assess need for intravenous fluids  - Provide specific nutrition/hydration education as appropriate  - Include patient/family/caregiver in decisions related to nutrition  Outcome: Progressing     Problem: Prexisting or High Potential for Compromised Skin Integrity  Goal: Skin integrity is maintained or improved  Description: INTERVENTIONS:  - Identify patients at risk for skin breakdown  - Assess and monitor skin integrity  - Assess and monitor nutrition and hydration status  - Monitor labs   - Assess for incontinence   - Turn and reposition patient  - Assist with mobility/ambulation  - Relieve pressure over bony prominences  - Avoid friction and shearing  - Provide appropriate hygiene as needed including keeping skin clean and dry  - Evaluate need for skin moisturizer/barrier cream  - Collaborate with interdisciplinary team   - Patient/family teaching  - Consider wound care consult   Outcome: Progressing     Problem: Potential for Falls  Goal: Patient will remain free of falls  Description: INTERVENTIONS:  - Educate patient/family on patient safety including physical limitations  - Instruct patient to call for assistance with activity   - Consult OT/PT to assist with strengthening/mobility   - Keep Call bell within reach  - Keep bed low and locked with side rails adjusted as appropriate  - Keep care items and personal belongings within reach  - Initiate and maintain comfort rounds  - Make Fall Risk Sign visible to staff  - Offer Toileting every 2 Hours, in advance of need  - Initiate/Maintain bed alarm  - Obtain necessary fall risk management equipment:  - Apply yellow socks and bracelet for high fall risk patients  - Consider moving patient to room near nurses station  Outcome: Progressing     Problem: Neurological Deficit  Goal: Neurological status is stable or improving  Description: Interventions:  - Monitor and assess patient's level of consciousness, motor function, sensory function, and level of assistance needed for ADLs  - Monitor and report changes from baseline  Collaborate with interdisciplinary team to initiate plan and implement interventions as ordered  - Provide and maintain a safe environment  - Consider seizure precautions  - Consider fall precautions  - Consider aspiration precautions  - Consider bleeding precautions  Outcome: Progressing     Problem: Activity Intolerance/Impaired Mobility  Goal: Mobility/activity is maintained at optimum level for patient  Description: Interventions:  - Assess and monitor patient  barriers to mobility and need for assistive/adaptive devices  - Assess patient's emotional response to limitations  - Collaborate with interdisciplinary team and initiate plans and interventions as ordered  - Encourage independent activity per ability   - Maintain proper body alignment  - Perform active/passive rom as tolerated/ordered    - Plan activities to conserve energy   - Turn patient as appropriate  Outcome: Progressing     Problem: Communication Impairment  Goal: Ability to express needs and understand communication  Description: Assess patient's communication skills and ability to understand information  Patient will demonstrate use of effective communication techniques, alternative methods of communication and understanding even if not able to speak  - Encourage communication and provide alternate methods of communication as needed  - Collaborate with case management/ for discharge needs  - Include patient/family/caregiver in decisions related to communication  Outcome: Progressing     Problem: Potential for Aspiration  Goal: Ventilated patient's risk of aspiration is minimized  Description: Assess and monitor vital signs, respiratory status, airway cuff pressure, and labs (WBC)  Monitor for signs of aspiration (tachypnea, cough, rales, wheezing, cyanosis, fever)  - Elevate head of bed 30 degrees if patient has tube feeding   - Monitor tube feeding  Outcome: Progressing     Problem: Nutrition  Goal: Nutrition/Hydration status is improving  Description: Monitor and assess patient's nutrition/hydration status for malnutrition (ex- brittle hair, bruises, dry skin, pale skin and conjunctiva, muscle wasting, smooth red tongue, and disorientation)  Collaborate with interdisciplinary team and initiate plan and interventions as ordered  Monitor patient's weight and dietary intake as ordered or per policy  Utilize nutrition screening tool and intervene per policy  Determine patient's food preferences and provide high-protein, high-caloric foods as appropriate  - Assist patient with eating   - Allow adequate time for meals   - Encourage patient to take dietary supplement as ordered  - Collaborate with clinical nutritionist   - Include patient/family/caregiver in decisions related to nutrition    Outcome: Progressing

## 2022-09-09 NOTE — UTILIZATION REVIEW
Continued Stay Review    Date: 09/09                    Current Patient Class: IP  Current Level of Care: Critical Care    HPI:61 y o  male initially admitted on 08/16/2022     Assessment/Plan: Overnight, patient was febrile w/ T-100 5F, and coughed so violently the tube was displaced  CXR obtained to confirm appropriate position  Labile BP  He developed R groin hematoma, + hematuria  Held hep gtt  Will restart once hgb stable  Remained intubated, cont PS  Will need trach and PEG  Gen surg consulted  Urology consult  Cont IV abx  Cont Insulin gtt  IV ppi  Palliative consulted- ongoing [address] discussions  On exam, Not waking up off sedation; withdraws to pain and grimaces  Vital Signs: /63   Pulse (!) 108   Temp 99 1 °F (37 3 °C)   Resp 19   Ht 5' 4" (1 626 m)   Wt 71 7 kg (158 lb 1 1 oz)   SpO2 100%   BMI 27 13 kg/m²       Pertinent Labs/Diagnostic Results:   09/09CXR: No acute cardiopulmonary disease  Results from last 7 days   Lab Units 09/09/22  0611 09/08/22 0447 09/07/22  2241 09/07/22  1148 09/07/22  0449 09/05/22  1904 09/05/22  1111   WBC Thousand/uL 12 57* 14 90* 14 75*  --  16 84*   < > 15 80*   HEMOGLOBIN g/dL 9 3* 10 0* 10 6* 10 9* 10 8*   < > 15 7   I STAT HEMOGLOBIN   --   --   --   --   --    < >  --    HEMATOCRIT % 27 9* 30 1* 30 9* 32 8* 32 1*   < > 47 3   HEMATOCRIT, ISTAT   --   --   --   --   --    < >  --    PLATELETS Thousands/uL 211 179 193  --  181   < > 306   NEUTROS ABS Thousands/µL 10 84* 13 08*  --   --  14 99*   < >  --    BANDS PCT %  --   --   --   --   --   --  4    < > = values in this interval not displayed           Results from last 7 days   Lab Units 09/09/22  0530 09/08/22  0447 09/07/22  1148 09/07/22  0449 09/06/22  1225 09/06/22 0448 09/05/22  2105 09/05/22  2015 09/05/22  1904   SODIUM mmol/L 146 148* 150* 150* 149* 147 146  --   --    POTASSIUM mmol/L 3 6 3 2* 3 5 3 5 4 0 3 4* 3 4*  --   --    CHLORIDE mmol/L 114* 115* 118* 119* 119* 117* 116*  -- --    CO2 mmol/L 28 27 25 25 25 25 22  --   --    CO2, I-STAT mmol/L  --   --   --   --   --   --   --  23 27   ANION GAP mmol/L 4 6 7 6 5 5 8  --   --    BUN mg/dL 41* 41* 46* 45* 47* 51* 61*  --   --    CREATININE mg/dL 0 97 1 04 1 14 1 22 1 12 1 23 1 77*  --   --    EGFR ml/min/1 73sq m 83 76 68 63 70 62 40  --   --    CALCIUM mg/dL 8 1* 7 7* 7 9* 7 6* 7 9* 8 3 7 4*  --   --    CALCIUM, IONIZED mmol/L  --   --   --   --   --  1 12 1 03*  --   --    CALCIUM, IONIZED, ISTAT mmol/L  --   --   --   --   --   --   --  1 01* 1 08*   MAGNESIUM mg/dL  --   --   --   --   --  2 7* 2 7*  --   --    PHOSPHORUS mg/dL  --   --   --  3 8 3 7 1 8* 5 2*  --   --      Results from last 7 days   Lab Units 09/09/22  0530 09/05/22  2105   AST U/L 29 291*   ALT U/L 79* 360*   ALK PHOS U/L 104 126*   TOTAL PROTEIN g/dL 5 7* 5 8*   ALBUMIN g/dL 2 1* 2 8*   TOTAL BILIRUBIN mg/dL 0 47 1 48*   BILIRUBIN DIRECT mg/dL 0 17  --      Results from last 7 days   Lab Units 09/09/22  1612 09/09/22  1346 09/09/22  1111 09/09/22  0537 09/09/22  0011 09/08/22  1956 09/08/22  1608 09/08/22  1104 09/08/22  0556 09/08/22  0002 09/07/22  1756 09/07/22  1143   POC GLUCOSE mg/dl 219* 293* 348* 309* 281* 260* 353* 312* 286* 158* 264* 306*     Results from last 7 days   Lab Units 09/09/22  0530 09/08/22  0447 09/07/22  1148 09/07/22  0449 09/06/22  1225 09/06/22  0448 09/05/22  2105 09/05/22  1111 09/05/22  0803 09/04/22  0532 09/03/22  0943   GLUCOSE RANDOM mg/dL 309* 300* 260* 308* 269* 243* 272* 201* 167* 111 163*             No results found for: BETA-HYDROXYBUTYRATE   Results from last 7 days   Lab Units 09/06/22  1225 09/06/22  0910 09/06/22  0451   PH ART  7 449 7 506* 7 505*   PCO2 ART mm Hg 34 2* 30 9* 30 5*   PO2 ART mm Hg 119 1 234 0* 205 4*   HCO3 ART mmol/L 23 2 23 9 23 5   BASE EXC ART mmol/L -0 3 1 5 1 2   O2 CONTENT ART mL/dL 17 1 18 3 18 4   O2 HGB, ARTERIAL % 97 1* 98 5* 98 0*   ABG SOURCE  Line, Arterial Line, Arterial Line, Arterial Results from last 7 days   Lab Units 09/05/22  2015 09/05/22  1904   I STAT BASE EXC mmol/L -3* 1   I STAT O2 SAT % 100*  --    ISTAT PH ART  7 361 7 411   I STAT ART PCO2 mm HG 38 8 40 0   I STAT ART PO2 mm  0* >400 0*   I STAT ART HCO3 mmol/L 22 0 25 4         Results from last 7 days   Lab Units 09/05/22  1550 09/05/22  1320 09/05/22  1111   HS TNI 0HR ng/L  --   --  367*   HS TNI 2HR ng/L  --  2,518*  --    HSTNI D2 ng/L  --  2,151*  --    HS TNI 4HR ng/L 2,302*  --   --    HSTNI D4 ng/L 1,935*  --   --          Results from last 7 days   Lab Units 09/09/22  0000 09/08/22  1805 09/08/22  1132 09/08/22  0445 09/07/22  2241 09/07/22  0449 09/06/22  0448   PROTIME seconds  --   --   --   --  16 7* 17 9* 18 3*   INR   --   --   --   --  1 33* 1 45* 1 49*   PTT seconds 67* 77* 82*   < > 34  --   --     < > = values in this interval not displayed  Results from last 7 days   Lab Units 09/06/22  0448 09/05/22  1111 09/05/22  0212   PROCALCITONIN ng/ml 7 41* 0 23 0 14     Results from last 7 days   Lab Units 09/06/22  1524 09/06/22  0003 09/05/22  2105 09/05/22  1320 09/05/22  1111   LACTIC ACID mmol/L 1 4 1 5 2 9* 2 6* 6 0*       Results from last 7 days   Lab Units 09/05/22  1551   STREP PNEUMONIAE ANTIGEN, URINE  Negative   LEGIONELLA URINARY ANTIGEN  Negative       Results from last 7 days   Lab Units 09/08/22  1806 09/07/22  1630 09/06/22  1539 09/06/22  1524 09/05/22  1156 09/05/22  0154   BLOOD CULTURE  Received in Microbiology Lab  Culture in Progress  Received in Microbiology Lab  Culture in Progress  --  No Growth at 48 hrs  No Growth at 48 hrs  No Growth After 4 Days  No Growth After 4 Days  No Growth After 4 Days  No Growth After 4 Days     SPUTUM CULTURE   --  2+ Growth of Candida albicans*  --   --   --   --    GRAM STAIN RESULT   --  4+ Polys*  2+ Budding Yeast with Pseudomycelia*  Rare Gram positive rods*  --   --   --   --                  Medications:   Scheduled Medications:  amLODIPine, 5 mg, Oral, Daily  aspirin, 81 mg, Oral, Daily  atorvastatin, 40 mg, Oral, After Dinner  cefTRIAXone, 2,000 mg, Intravenous, Q24H  chlorhexidine, 15 mL, Mouth/Throat, Q12H LUI  chlorthalidone, 25 mg, Oral, Daily  citalopram, 10 mg, Oral, Daily  heparin (porcine), 5,000 Units, Subcutaneous, Q8H LUI  pantoprazole, 40 mg, Intravenous, Q24H [address]  polyethylene glycol, 17 g, Oral, Daily  senna-docusate sodium, 2 tablet, Oral, BID      Continuous IV Infusions:  insulin regular (HumuLIN R,NovoLIN R) infusion, 0 3-21 Units/hr, Intravenous, Titrated  niCARdipine, 1-15 mg/hr, Intravenous, Titrated  norepinephrine, 1-30 mcg/min, Intravenous, Titrated  heparin (porcine) 25,000 units in 0 45% NaCl 250 mL infusion (premix)  Rate: 2 1-14 mL/hr Dose: 3-20 Units/kg/hr  Weight Dosing Info: 70 kg (Order-Specific)  Freq: Titrated Route: IV  Last Dose: Stopped (09/09/22 1115)  Start: 09/07/22 2300 End: 09/09/22 1105    PRN Meds:  acetaminophen, 975 mg, Oral, Q8H PRN 09/09 x 1  bisacodyl, 10 mg, Rectal, Daily PRN  glycerin-hypromellose-, 1 drop, Both Eyes, Q6H PRN  hydrALAZINE, 10 mg, Intravenous, Q4H PRN 09/09 x 1  HYDROmorphone, 0 5 mg, Intravenous, Q1H PRN  labetalol, 20 mg, Intravenous, Q4H PRN 09/09 x 1  LORazepam, 0 5 mg, Intravenous, Q1H PRN  ondansetron, 4 mg, Intravenous, Q6H PRN        Discharge Plan: [facility]    Network Utilization Review Department  ATTENTION: Please call with any questions or concerns to [phone] and carefully listen to the prompts so that you are directed to the right person  All voicemails are confidential   [facility] all requests for admission clinical reviews, approved or denied determinations and any other requests to dedicated fax number below belonging to the campus where the patient is receiving treatment   List of dedicated fax numbers for the Facilities:  [address] DENIALS (Administrative/Medical Necessity) [phone]   [address] (Maternity/NICU/Pediatrics) 261 [facility] 40 125 [facility]  [phone]   Meliza Nath 50 [id number] [id number] [place] [address] [address] [facility] [address] [facility] [phone]

## 2022-09-09 NOTE — PROGRESS NOTES
Pastoral Care Progress Note    2022  Patient: Sabina Chou : 1960  Admission Date & Time: 2022 1220  MRN: [id number] CSN: [id number]                     Chaplaincy Interventions Utilized:   Empowerment: Clarified, confirmed, or reviewed information from treatment team  and Encouraged self-care    Collaboration: Consulted with interdisciplinary team    Relationship Building: Cultivated a relationship of care and support, Listened empathically, and Provided silent and supportive presence    Ritual: Napoleon Sos provided sacrament of the sick    Chaplaincy Outcomes Achieved:  Progressed toward acceptance    Spiritual Coping Strategies Utilized:   Connectedness and Spiritual comfort      Exceptional family meeting with the pt's family and physicians as they discussed decision-making,and what comfort care entails  The family members, including the pt's wife, Gi Camp, were given ample opportunities to express their feelings

## 2022-09-09 NOTE — PHYSICAL THERAPY NOTE
Physical Therapy Cancellation Note       09/09/22 1103   PT Last Visit   PT Visit Date 09/09/22   Note Type   Note Type Cancelled Session   Cancel Reasons Intubated/sedated       PT consult received  Chart reviewed  Patient is currently intubated at this time and not medically appropriate for participation in skilled PT services  Please re-consult when medically appropriate         IDANIA SMART PT, DPT

## 2022-09-09 NOTE — RESPIRATORY THERAPY NOTE
RT Ventilator Management Note  Elvin Brown 57 y o  male MRN: [id number]  Unit/Bed#: [facility] 11 Encounter: [id number]      Daily Screen         9/8/2022  0814 9/9/2022  0759          Patient safety screen outcome[de-identified] Passed Passed      Spont breathing trial % for 30 min: Yes Yes      Spont breathing trial outcome[de-identified] Passed Passed                Physical Exam:   Assessment Type: (P) Assess only  General Appearance: Sleeping  Respiratory Pattern: Assisted  Chest Assessment: Chest expansion symmetrical  Bilateral Breath Sounds: Diminished      Resp Comments: (P) Pt placed on Spont  PS 5/6 40%  BS decreased clear  Will continue to monitor pt

## 2022-09-09 NOTE — OCCUPATIONAL THERAPY NOTE
OT CANCEL NOTE    Pt chart reviewed  Pt remains medically unstable for skilled OT services 2/2 being intubated/sedated  Will D/C OT  Please re-consult once medically appropriate         09/09/22 1050   OT Last Visit   OT Visit Date 09/09/22   Note Type   Note Type Cancelled Session   Cancel Reasons Intubated/sedated       Joe Triana MS, OTR/L

## 2022-09-09 NOTE — TELEPHONE ENCOUNTER
spoke with [facility] about head CT order and they will call back once the pateint is therapeutic on heparin to schedule scan
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
## 2022-09-11 NOTE — PLAN OF CARE
Problem: MOBILITY - ADULT  Goal: Maintain or return to baseline ADL function  Description: INTERVENTIONS:  -  Assess patient's ability to carry out ADLs; assess patient's baseline for ADL function and identify physical deficits which impact ability to perform ADLs (bathing, care of mouth/teeth, toileting, grooming, dressing, etc )  - Assess/evaluate cause of self-care deficits   - Assess range of motion  - Assess patient's mobility; develop plan if impaired  - Assess patient's need for assistive devices and provide as appropriate  - Encourage maximum independence but intervene and supervise when necessary  - Involve family in performance of ADLs  - Assess for home care needs following discharge   - Consider OT consult to assist with ADL evaluation and planning for discharge  - Provide patient education as appropriate  Outcome: Progressing  Goal: Maintains/Returns to pre admission functional level  Description: INTERVENTIONS:  - Perform BMAT or MOVE assessment daily    - Set and communicate daily mobility goal to care team and patient/family/caregiver  - Collaborate with rehabilitation services on mobility goals if consulted  - Perform Range of Motion  times a day  - Reposition patient every  hours    - Dangle patient  times a day  - Stand patient  times a day  - Ambulate patient  times a day  - Out of bed to chair  times a day   - Out of bed for meals  times a day  - Out of bed for toileting  - Record patient progress and toleration of activity level   Outcome: Progressing     Problem: PAIN - ADULT  Goal: Verbalizes/displays adequate comfort level or baseline comfort level  Description: Interventions:  - Encourage patient to monitor pain and request assistance  - Assess pain using appropriate pain scale  - Administer analgesics based on type and severity of pain and evaluate response  - Implement non-pharmacological measures as appropriate and evaluate response  - Consider cultural and social influences on pain and pain management  - Notify physician/advanced practitioner if interventions unsuccessful or patient reports new pain  Outcome: Progressing     Problem: INFECTION - ADULT  Goal: Absence or prevention of progression during hospitalization  Description: INTERVENTIONS:  - Assess and monitor for signs and symptoms of infection  - Monitor lab/diagnostic results  - Monitor all insertion sites, i e  indwelling lines, tubes, and drains  - Monitor endotracheal if appropriate and nasal secretions for changes in amount and color  - [place] appropriate cooling/warming therapies per order  - Administer medications as ordered  - Instruct and encourage patient and family to use good hand hygiene technique  - Identify and instruct in appropriate isolation precautions for identified infection/condition  Outcome: Progressing  Goal: Absence of fever/infection during neutropenic period  Description: INTERVENTIONS:  - Monitor WBC    Outcome: Progressing     Problem: SAFETY ADULT  Goal: Maintain or return to baseline ADL function  Description: INTERVENTIONS:  -  Assess patient's ability to carry out ADLs; assess patient's baseline for ADL function and identify physical deficits which impact ability to perform ADLs (bathing, care of mouth/teeth, toileting, grooming, dressing, etc )  - Assess/evaluate cause of self-care deficits   - Assess range of motion  - Assess patient's mobility; develop plan if impaired  - Assess patient's need for assistive devices and provide as appropriate  - Encourage maximum independence but intervene and supervise when necessary  - Involve family in performance of ADLs  - Assess for home care needs following discharge   - Consider OT consult to assist with ADL evaluation and planning for discharge  - Provide patient education as appropriate  Outcome: Progressing  Goal: Maintains/Returns to pre admission functional level  Description: INTERVENTIONS:  - Perform BMAT or MOVE assessment daily    - Set and communicate daily mobility goal to care team and patient/family/caregiver  - Collaborate with rehabilitation services on mobility goals if consulted  - Perform Range of Motion  times a day  - Reposition patient every  hours    - Dangle patient  times a day  - Stand patient  times a day  - Ambulate patient  times a day  - Out of bed to chair  times a day   - Out of bed for meals  times a day  - Out of bed for toileting  - Record patient progress and toleration of activity level   Outcome: Progressing  Goal: Patient will remain free of falls  Description: INTERVENTIONS:  - Educate patient/family on patient safety including physical limitations  - Instruct patient to call for assistance with activity   - Consult OT/PT to assist with strengthening/mobility   - Keep Call bell within reach  - Keep bed low and locked with side rails adjusted as appropriate  - Keep care items and personal belongings within reach  - Initiate and maintain comfort rounds  - Make Fall Risk Sign visible to staff  - Offer Toileting every  Hours, in advance of need  - Initiate/Maintain alarm  - Obtain necessary fall risk management equipment:   - Apply yellow socks and bracelet for high fall risk patients  - Consider moving patient to room near nurses station  Outcome: Progressing     Problem: DISCHARGE PLANNING  Goal: Discharge to home or other facility with appropriate resources  Description: INTERVENTIONS:  - Identify barriers to discharge w/patient and caregiver  - Arrange for needed discharge resources and transportation as appropriate  - Identify discharge learning needs (meds, wound care, etc )  - Arrange for interpretive services to assist at discharge as needed  - Refer to Case Management Department for coordinating discharge planning if the patient needs post-hospital services based on physician/advanced practitioner order or complex needs related to functional status, cognitive ability, or social support system  Outcome: Progressing Problem: Knowledge Deficit  Goal: Patient/family/caregiver demonstrates understanding of disease process, treatment plan, medications, and discharge instructions  Description: Complete learning assessment and assess knowledge base  Interventions:  - Provide teaching at level of understanding  - Provide teaching via preferred learning methods  Outcome: Progressing     Problem: Nutrition/Hydration-ADULT  Goal: Nutrient/Hydration intake appropriate for improving, restoring or maintaining nutritional needs  Description: Monitor and assess patient's nutrition/hydration status for malnutrition  Collaborate with interdisciplinary team and initiate plan and interventions as ordered  Monitor patient's weight and dietary intake as ordered or per policy  Utilize nutrition screening tool and intervene as necessary  Determine patient's food preferences and provide high-protein, high-caloric foods as appropriate       INTERVENTIONS:  - Monitor oral intake, urinary output, labs, and treatment plans  - Assess nutrition and hydration status and recommend course of action  - Evaluate amount of meals eaten  - Assist patient with eating if necessary   - Allow adequate time for meals  - Recommend/ encourage appropriate diets, oral nutritional supplements, and vitamin/mineral supplements  - Order, calculate, and assess calorie counts as needed  - Recommend, monitor, and adjust tube feedings and TPN/PPN based on assessed needs  - Assess need for intravenous fluids  - Provide specific nutrition/hydration education as appropriate  - Include patient/family/caregiver in decisions related to nutrition  Outcome: Progressing     Problem: Prexisting or High Potential for Compromised Skin Integrity  Goal: Skin integrity is maintained or improved  Description: INTERVENTIONS:  - Identify patients at risk for skin breakdown  - Assess and monitor skin integrity  - Assess and monitor nutrition and hydration status  - Monitor labs   - Assess for incontinence   - Turn and reposition patient  - Assist with mobility/ambulation  - Relieve pressure over bony prominences  - Avoid friction and shearing  - Provide appropriate hygiene as needed including keeping skin clean and dry  - Evaluate need for skin moisturizer/barrier cream  - Collaborate with interdisciplinary team   - Patient/family teaching  - Consider wound care consult   Outcome: Progressing     Problem: Potential for Falls  Goal: Patient will remain free of falls  Description: INTERVENTIONS:  - Educate patient/family on patient safety including physical limitations  - Instruct patient to call for assistance with activity   - Consult OT/PT to assist with strengthening/mobility   - Keep Call bell within reach  - Keep bed low and locked with side rails adjusted as appropriate  - Keep care items and personal belongings within reach  - Initiate and maintain comfort rounds  - Make Fall Risk Sign visible to staff  - Offer Toileting every  Hours, in advance of need  - Initiate/Maintain alarm  - Obtain necessary fall risk management equipment  - Apply yellow socks and bracelet for high fall risk patients  - Consider moving patient to room near nurses station  Outcome: Progressing     Problem: Neurological Deficit  Goal: Neurological status is stable or improving  Description: Interventions:  - Monitor and assess patient's level of consciousness, motor function, sensory function, and level of assistance needed for ADLs  - Monitor and report changes from baseline  Collaborate with interdisciplinary team to initiate plan and implement interventions as ordered  - Provide and maintain a safe environment  - Consider seizure precautions  - Consider fall precautions  - Consider aspiration precautions  - Consider bleeding precautions    Outcome: Progressing     Problem: Communication Impairment  Goal: Ability to express needs and understand communication  Description: Assess patient's communication skills and ability to understand information  Patient will demonstrate use of effective communication techniques, alternative methods of communication and understanding even if not able to speak  - Encourage communication and provide alternate methods of communication as needed  - Collaborate with case management/ for discharge needs  - Include patient/family/caregiver in decisions related to communication  Outcome: Progressing     Problem: Potential for Aspiration  Goal: Ventilated patient's risk of aspiration is minimized  Description: Assess and monitor vital signs, respiratory status, airway cuff pressure, and labs (WBC)  Monitor for signs of aspiration (tachypnea, cough, rales, wheezing, cyanosis, fever)  - Elevate head of bed 30 degrees if patient has tube feeding   - Monitor tube feeding  Outcome: Progressing     Problem: Nutrition  Goal: Nutrition/Hydration status is improving  Description: Monitor and assess patient's nutrition/hydration status for malnutrition (ex- brittle hair, bruises, dry skin, pale skin and conjunctiva, muscle wasting, smooth red tongue, and disorientation)  Collaborate with interdisciplinary team and initiate plan and interventions as ordered  Monitor patient's weight and dietary intake as ordered or per policy  Utilize nutrition screening tool and intervene per policy  Determine patient's food preferences and provide high-protein, high-caloric foods as appropriate  - Assist patient with eating   - Allow adequate time for meals   - Encourage patient to take dietary supplement as ordered  - Collaborate with clinical nutritionist   - Include patient/family/caregiver in decisions related to nutrition    Outcome: Progressing

## 2022-09-11 NOTE — ASSESSMENT & PLAN NOTE
-Likely due to hypertension, no embolic source identified   Plan:   § No changes to therapy or medications, patient is comfort care only
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
## 2022-09-11 NOTE — PROGRESS NOTES
Progress Note - Palliative & Supportive Care  Jakob Irving  57 y o   male  MICU 11/MICU 6   MRN: [id number]  Encounter: [id number]     ASSESSMENT:    Patient Active Problem List   Diagnosis    Acute right thalamic intraparenchymal hemorrhage    Diabetes mellitus ([address] )    Cervical spinal stenosis    Hypertension    Obesity (BMI 30-39  9)    Leukocytosis    H/O cardiac arrest    Goals of care, counseling/discussion     Active issues specifically addressed today include: acute R thalamic intraparenchymal hemorrhage, cardiac arrest, goals of care, hospice services, psychosocial support, EOL care considerations, pain, dyspnea, anxiousness    PLAN:    1  Goals:    Continue comfort cares  Plan is to transition patient out of the MICU to the floors as he no longer requires intensive level of care  Family prefers the patient not be transferred to the 7th floor d/t associations w/ his prior stay there, and prefer a "quiet" floor  MICU team working on this   Family aware that should he be stable for transfer to a setting such as the hospice IPU, this would be discussed at that time - but for now the plan is to continue comfort care in the hospital setting   Palliative will follow for ongoing support  2  Social Support:   Supportive listening provided   Normalized experience of patient/family   Provided anxiety containment    3  Symptom management:   Continue comfort care orders, including:  o hydromorphone gtt (currently at 4mg/hr); titrate as appropriate  o hydromorphone 0 5mg IV q15min PRN breakthrough pain or dyspnea  o Tylenol GA PRN fever  o bisacodyl GA PRN constipation  o glycopyrrolate PRN excessive secretions  o haloperidol 0 5mg IV q2h PRN agitation or vomiting  o lorazepam 1mg IV q10min PRN anxiety, dyspnea  o ondansetron 4mg IV PRN N/V   Continue O2 via NC for comfort   Comfort cart for family, oral care, continue current lines     Counseled that IVF can be a risk in the setting of EOL, without providing benefit  Currently nearly at [place] levels (20cc/hr)  May continue IVF for now if family prefers, but would recommend discontinuing after 24h to decrease risk of fluid overload  Code status: Level 4 - Comfort Care   Decisional apparatus:  Patient does not have capacity to make medical decisions on my exam today  If such capacity is lost, patient's substitute decision maker would default to spouse by [place]  Advance Directive / Living Will / POLST:  Nothing on file  We appreciate the opportunity to participate in this patient's care  We will continue to follow  Please do not hesitate to contact our on-call provider through our clinic answering service at [phone] should you have acute symptom control concerns  INTERVAL HISTORY:    Patient seen and examined at bedside in MICU, family present  Family had asked to speak with [facility] as they had questions about his EOL trajectory, EOL and comfort cares, IVF, O2 support for comfort, symptom control, disposition (will he remain in this hospital vs go to the hospice IPU, if in the hospital where would he go from the MICU)  All questions answered and psychosocial support provided  Patient appears comfortable w/ no objective signs of pain, agitation, or distress  Family demonstrating appropriate anticipated grief      Review of Systems   Unable to perform ROS: Patient unresponsive     MEDICATIONS / ALLERGIES:  all current active meds have been reviewed and current meds:   Current Facility-Administered Medications   Medication Dose Route Frequency    acetaminophen (TYLENOL) rectal suppository 650 mg  650 mg Rectal Q6H PRN    bisacodyl (DULCOLAX) rectal suppository 10 mg  10 mg Rectal Daily PRN    glycerin-hypromellose- (ARTIFICIAL TEARS) ophthalmic solution 1 drop  1 drop Both Eyes Q6H PRN    glycopyrrolate (ROBINUL) injection 0 1 mg  0 1 mg Intravenous Q4H PRN    haloperidol lactate (HALDOL) injection 0 5 mg  0 5 mg Intravenous Q2H PRN  HYDROmorphone (DILAUDID) 50 mg in sodium chloride 0 9% 50mL drip  4 mg/hr Intravenous Continuous    HYDROmorphone (DILAUDID) injection 0 5 mg  0 5 mg Intravenous Q15 Min PRN    LORazepam (ATIVAN) injection 1 mg  1 mg Intravenous Q10 Min PRN    ondansetron (ZOFRAN) injection 4 mg  4 mg Intravenous Q4H PRN    polyethylene glycol (MIRALAX) packet 17 g  17 g Oral Daily    senna-docusate sodium (SENOKOT S) 8 6-50 mg per tablet 2 tablet  2 tablet Oral BID       Not on File    OBJECTIVE:  /72   Pulse (!) 114   Temp (!) 101 12 °F (38 4 °C)   Resp 20   Ht 5' 4" (1 626 m)   Wt 71 7 kg (158 lb 1 1 oz)   SpO2 98%   BMI 27 13 kg/m²   Nursing notes reviewed  Physical Exam  Vitals and nursing note reviewed  Constitutional:       General: He is sleeping  Appearance: He is ill-appearing  Interventions: Nasal cannula in place  HENT:      Head: Normocephalic and atraumatic  Right Ear: External ear normal       Left Ear: External ear normal       Nose: Nose normal       Mouth/Throat:      Mouth: Mucous membranes are dry  Comments: Mouth open w/ some tongue protrusion; dry mucous membrane  Eyes:      Comments: Eyes remained closed  Pulmonary:      Effort: Pulmonary effort is normal  No respiratory distress  Abdominal:      Palpations: Abdomen is soft  Musculoskeletal:      Right lower leg: No edema  Left lower leg: No edema  Skin:     General: Skin is warm  Neurological:      Mental Status: He is unresponsive  Comments: Not responsive to verbal or gently physical stimuli  Psychiatric:         Attention and Perception: He is inattentive  Speech: He is noncommunicative  Lab Results: I have personally reviewed pertinent labs      HEMATOLOGY PROFILE:  Results from last 7 days   Lab Units 09/10/22  0549 09/09/22  0611 09/08/22  0447   WBC Thousand/uL 11 42* 12 57* 14 90*   HEMOGLOBIN g/dL 8 6* 9 3* 10 0*   HEMATOCRIT % 26 3* 27 9* 30 1*   PLATELETS Thousands/uL 217 211 179   NEUTROS PCT % 83* 85* 88*   MONOS PCT % 6 5 5       CHEMISTRY PROFILE:  Results from last 7 days   Lab Units 09/10/22  0549 09/09/22 2056 09/09/22  0530 09/06/22  0448 09/05/22 2105 09/05/22 2015 09/05/22  1904   POTASSIUM mmol/L 2 8* 3 2* 3 6   < > 3 4*  --   --    CHLORIDE mmol/L 115* 116* 114*   < > 116*  --   --    CO2 mmol/L 33* 31 28   < > 22  --   --    CO2, I-STAT mmol/L  --   --   --   --   --  23 27   BUN mg/dL 36* 42* 41*   < > 61*  --   --    CREATININE mg/dL 0 81 1 12 0 97   < > 1 77*  --   --    CALCIUM mg/dL 7 8* 8 3 8 1*   < > 7 4*  --   --    ALK PHOS U/L  --   --  104  --  126*  --   --    ALT U/L  --   --  79*  --  360*  --   --    AST U/L  --   --  29  --  291*  --   --    GLUCOSE, ISTAT mg/dl  --   --   --   --   --  250* 276*    < > = values in this interval not displayed  Albumin:  0   Lab Value Date/Time    ALB 2 1 (L) 09/09/2022 0530     Imaging Studies: I have personally reviewed pertinent reports  EKG, Pathology, and Other Studies: I have personally reviewed pertinent reports  Counseling / Coordination of Care: Total floor / unit time spent today 50+ minutes, including 25+ minutes spent in bedside discussions w/ family (approximately [phone]) and discussions w/ MICU team  Greater than 50% of total time was spent with the patient and / or family counseling and / or coordination of care  A description of the counseling / coordination of care: symptom assessment and management, medication review, psychosocial support, chart review, imaging review, lab review, goals of care, hospice services, supportive listening and anticipatory guidance  Airam Sears MD  [facility] Palliative and Supportive Care      Portions of this document may have been created using dictation software and as such some "sound alike" terms may have been generated by the system  Do not hesitate to contact me with any questions or clarifications
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
## 2022-09-11 NOTE — CASE MANAGEMENT
Case Management Progress Note    Patient name Lis Person  [facility] [address] MRN [id number]  : 1960 Date 2022       LOS (days): 16  Geometric Mean LOS (GMLOS) (days):   Days to GMLOS:        OBJECTIVE:        Current admission status: Inpatient  Preferred Pharmacy: No Pharmacies Listed  Primary Care Provider: No primary care provider on file  Primary Insurance: BLUE CROSS  Secondary Insurance:     PROGRESS NOTE: Consult placed for hospice placement  This CM went to speak to wife, Irina Gupta, who is present at patient's bedside  This CM spoke to wife about hospice house  Wife states that she was told that there is a floor in the hospital setting that specifically takes care of hospice patients  This CM explained hospice house  Wife requesting no hospice referral be placed at this time  Wife states that she was informed by medical team that patient would not be moved to another facility, and that he would pass peacefully here at [facility]  Medical team made aware

## 2022-09-12 NOTE — UTILIZATION REVIEW
Continued Stay Review    Date:      FOR  9/11                          Current Patient Class:    Inpatient  Current Level of Care:    Med surg    HPI:61 y o  male initially admitted on    8/26        Assessment/Plan:   9/11    Remains on  Comfort care measures  Continue dilaudid PCA  Continue current meds, possible transfer to  hospice        Vital Signs:   99 68 °F (37 6 °C) 106 Abnormal  -- -- -- 94 %         Pertinent Labs/Diagnostic Results:       Results from last 7 days   Lab Units 09/10/22  0549 09/09/22  0611 09/08/22  0447 09/07/22  2241 09/07/22  1148   WBC Thousand/uL 11 42* 12 57* 14 90* 14 75*  --    HEMOGLOBIN g/dL 8 6* 9 3* 10 0* 10 6* 10 9*   HEMATOCRIT % 26 3* 27 9* 30 1* 30 9* 32 8*   PLATELETS Thousands/uL 217 211 179 193  --    NEUTROS ABS Thousands/µL 9 39* 10 84* 13 08*  --   --          Results from last 7 days   Lab Units 09/10/22  0549 09/09/22 2056 09/09/22  0530 09/08/22  0447 09/07/22  1148 09/07/22  0449 09/06/22  1225 09/06/22 0448 09/05/22 2105 09/05/22 2105 09/05/22 2015 09/05/22  1904   SODIUM mmol/L 149* 148* 146 148* 150* 150* 149* 147   < > 146  --   --    POTASSIUM mmol/L 2 8* 3 2* 3 6 3 2* 3 5 3 5 4 0 3 4*   < > 3 4*  --   --    CHLORIDE mmol/L 115* 116* 114* 115* 118* 119* 119* 117*   < > 116*  --   --    CO2 mmol/L 33* 31 28 27 25 25 25 25   < > 22  --   --    CO2, I-STAT mmol/L  --   --   --   --   --   --   --   --   --   --  23 27   ANION GAP mmol/L 1* 1* 4 6 7 6 5 5   < > 8  --   --    BUN mg/dL 36* 42* 41* 41* 46* 45* 47* 51*   < > 61*  --   --    CREATININE mg/dL 0 81 1 12 0 97 1 04 1 14 1 22 1 12 1 23   < > 1 77*  --   --    EGFR ml/min/1 73sq m 95 70 83 76 68 63 70 62   < > 40  --   --    CALCIUM mg/dL 7 8* 8 3 8 1* 7 7* 7 9* 7 6* 7 9* 8 3   < > 7 4*  --   --    CALCIUM, IONIZED mmol/L 1 12  --   --   --   --   --   --  1 12  --  1 03*  --   --    CALCIUM, IONIZED, ISTAT mmol/L  --   --   --   --   --   --   --   --   --   --  1 01* 1 08*   MAGNESIUM mg/dL 2  8* 2 9*  --   --   --   --   --  2 7*  --  2 7*  --   --    PHOSPHORUS mg/dL 2 3 2 3  --   --   --  3 8 3 7 1 8*   < > 5 2*  --   --     < > = values in this interval not displayed  Results from last 7 days   Lab Units 09/09/22  0530 09/05/22  2105   AST U/L 29 291*   ALT U/L 79* 360*   ALK PHOS U/L 104 126*   TOTAL PROTEIN g/dL 5 7* 5 8*   ALBUMIN g/dL 2 1* 2 8*   TOTAL BILIRUBIN mg/dL 0 47 1 48*   BILIRUBIN DIRECT mg/dL 0 17  --      Results from last 7 days   Lab Units 09/10/22  1156 09/10/22  0959 09/10/22  0754 09/10/22  0549 09/10/22  0441 09/10/22  0212 09/10/22  0008 09/09/22  2207 09/09/22  2006 09/09/22  1810 09/09/22  1612 09/09/22  1346   POC GLUCOSE mg/dl 173* 213* 129 160* 157* 190* 208* 178* 214* 212* 219* 293*     Results from last 7 days   Lab Units 09/10/22  0549 09/09/22  2056 09/09/22  0530 09/08/22  0447 09/07/22  1148 09/07/22  0449 09/06/22  1225 09/06/22  0448 09/05/22  2105   GLUCOSE RANDOM mg/dL 150* 194* 309* 300* 260* 308* 269* 243* 272*               Medications:   Scheduled Medications:  polyethylene glycol, 17 g, Oral, Daily  senna-docusate sodium, 2 tablet, Oral, BID      Continuous IV Infusions:  HYDROmorphone, 4 mg/hr, Intravenous, Continuous      PRN Meds:  acetaminophen, 650 mg, Rectal, Q6H PRN  bisacodyl, 10 mg, Rectal, Daily PRN  glycerin-hypromellose-, 1 drop, Both Eyes, Q6H PRN  glycopyrrolate, 0 1 mg, Intravenous, Q4H PRN  haloperidol lactate, 0 5 mg, Intravenous, Q2H PRN  HYDROmorphone, 0 5 mg, Intravenous, Q15 Min PRN  LORazepam, 1 mg, Intravenous, Q10 Min PRN  ondansetron, 4 mg, Intravenous, Q4H PRN        Discharge Plan:    TBD    Network Utilization Review Department  ATTENTION: Please call with any questions or concerns to [phone] and carefully listen to the prompts so that you are directed to the right person   All voicemails are confidential   Harlene Pair all requests for admission clinical reviews, approved or denied determinations and any other requests to dedicated fax number below belonging to the campus where the patient is receiving treatment   List of dedicated fax numbers for the Facilities:  [address] [place] [address] DENIALS (Administrative/Medical Necessity) [phone]   [address]  [facility] (Maternity/NICU/Pediatrics) [phone]   [address]  [address] Medical [place] [address] [id number] [place] [address] [address] [facility] [phone]

## 2022-09-12 NOTE — PLAN OF CARE
Problem: MOBILITY - ADULT  Goal: Maintain or return to baseline ADL function  Description: INTERVENTIONS:  -  Assess patient's ability to carry out ADLs; assess patient's baseline for ADL function and identify physical deficits which impact ability to perform ADLs (bathing, care of mouth/teeth, toileting, grooming, dressing, etc )  - Assess/evaluate cause of self-care deficits   - Assess range of motion  - Assess patient's mobility; develop plan if impaired  - Assess patient's need for assistive devices and provide as appropriate  - Encourage maximum independence but intervene and supervise when necessary  - Involve family in performance of ADLs  - Assess for home care needs following discharge   - Consider OT consult to assist with ADL evaluation and planning for discharge  - Provide patient education as appropriate  Outcome: Progressing  Goal: Maintains/Returns to pre admission functional level  Description: INTERVENTIONS:  - Perform BMAT or MOVE assessment daily    - Set and communicate daily mobility goal to care team and patient/family/caregiver  - Collaborate with rehabilitation services on mobility goals if consulted  - Perform Range of Motion 3 times a day  - Reposition patient every 2 hours    - Dangle patient 3 times a day  - Stand patient 3 times a day  - Ambulate patient 3 times a day  - Out of bed to chair 3 times a day   - Out of bed for meals 3 times a day  - Out of bed for toileting  - Record patient progress and toleration of activity level   Outcome: Progressing     Problem: PAIN - ADULT  Goal: Verbalizes/displays adequate comfort level or baseline comfort level  Description: Interventions:  - Encourage patient to monitor pain and request assistance  - Assess pain using appropriate pain scale  - Administer analgesics based on type and severity of pain and evaluate response  - Implement non-pharmacological measures as appropriate and evaluate response  - Consider cultural and social influences on pain and pain management  - Notify physician/advanced practitioner if interventions unsuccessful or patient reports new pain  Outcome: Progressing     Problem: INFECTION - ADULT  Goal: Absence or prevention of progression during hospitalization  Description: INTERVENTIONS:  - Assess and monitor for signs and symptoms of infection  - Monitor lab/diagnostic results  - Monitor all insertion sites, i e  indwelling lines, tubes, and drains  - Monitor endotracheal if appropriate and nasal secretions for changes in amount and color  - Maria Stein appropriate cooling/warming therapies per order  - Administer medications as ordered  - Instruct and encourage patient and family to use good hand hygiene technique  - Identify and instruct in appropriate isolation precautions for identified infection/condition  Outcome: Progressing  Goal: Absence of fever/infection during neutropenic period  Description: INTERVENTIONS:  - Monitor WBC    Outcome: Progressing     Problem: SAFETY ADULT  Goal: Maintain or return to baseline ADL function  Description: INTERVENTIONS:  -  Assess patient's ability to carry out ADLs; assess patient's baseline for ADL function and identify physical deficits which impact ability to perform ADLs (bathing, care of mouth/teeth, toileting, grooming, dressing, etc )  - Assess/evaluate cause of self-care deficits   - Assess range of motion  - Assess patient's mobility; develop plan if impaired  - Assess patient's need for assistive devices and provide as appropriate  - Encourage maximum independence but intervene and supervise when necessary  - Involve family in performance of ADLs  - Assess for home care needs following discharge   - Consider OT consult to assist with ADL evaluation and planning for discharge  - Provide patient education as appropriate  Outcome: Progressing  Goal: Maintains/Returns to pre admission functional level  Description: INTERVENTIONS:  - Perform BMAT or MOVE assessment daily    - Set and communicate daily mobility goal to care team and patient/family/caregiver  - Collaborate with rehabilitation services on mobility goals if consulted  - Perform Range of Motion 3 times a day  - Reposition patient every 2 hours    - Dangle patient 3 times a day  - Stand patient 3 times a day  - Ambulate patient 3 times a day  - Out of bed to chair 3 times a day   - Out of bed for meals 3 times a day  - Out of bed for toileting  - Record patient progress and toleration of activity level   Outcome: Progressing  Goal: Patient will remain free of falls  Description: INTERVENTIONS:  - Educate patient/family on patient safety including physical limitations  - Instruct patient to call for assistance with activity   - Consult OT/PT to assist with strengthening/mobility   - Keep Call bell within reach  - Keep bed low and locked with side rails adjusted as appropriate  - Keep care items and personal belongings within reach  - Initiate and maintain comfort rounds  - Make Fall Risk Sign visible to staff  - Offer Toileting every 1 Hours, in advance of need  - Initiate/Maintain alarm  - Obtain necessary fall risk management equipment  - Apply yellow socks and bracelet for high fall risk patients  - Consider moving patient to room near nurses station  Outcome: Progressing     Problem: DISCHARGE PLANNING  Goal: Discharge to home or other facility with appropriate resources  Description: INTERVENTIONS:  - Identify barriers to discharge w/patient and caregiver  - Arrange for needed discharge resources and transportation as appropriate  - Identify discharge learning needs (meds, wound care, etc )  - Arrange for interpretive services to assist at discharge as needed  - Refer to Case Management Department for coordinating discharge planning if the patient needs post-hospital services based on physician/advanced practitioner order or complex needs related to functional status, cognitive ability, or social support system  Outcome: Progressing     Problem: Knowledge Deficit  Goal: Patient/family/caregiver demonstrates understanding of disease process, treatment plan, medications, and discharge instructions  Description: Complete learning assessment and assess knowledge base  Interventions:  - Provide teaching at level of understanding  - Provide teaching via preferred learning methods  Outcome: Progressing     Problem: Nutrition/Hydration-ADULT  Goal: Nutrient/Hydration intake appropriate for improving, restoring or maintaining nutritional needs  Description: Monitor and assess patient's nutrition/hydration status for malnutrition  Collaborate with interdisciplinary team and initiate plan and interventions as ordered  Monitor patient's weight and dietary intake as ordered or per policy  Utilize nutrition screening tool and intervene as necessary  Determine patient's food preferences and provide high-protein, high-caloric foods as appropriate       INTERVENTIONS:  - Monitor oral intake, urinary output, labs, and treatment plans  - Assess nutrition and hydration status and recommend course of action  - Evaluate amount of meals eaten  - Assist patient with eating if necessary   - Allow adequate time for meals  - Recommend/ encourage appropriate diets, oral nutritional supplements, and vitamin/mineral supplements  - Order, calculate, and assess calorie counts as needed  - Recommend, monitor, and adjust tube feedings and TPN/PPN based on assessed needs  - Assess need for intravenous fluids  - Provide specific nutrition/hydration education as appropriate  - Include patient/family/caregiver in decisions related to nutrition  Outcome: Progressing     Problem: Prexisting or High Potential for Compromised Skin Integrity  Goal: Skin integrity is maintained or improved  Description: INTERVENTIONS:  - Identify patients at risk for skin breakdown  - Assess and monitor skin integrity  - Assess and monitor nutrition and hydration status  - Monitor labs   - Assess for incontinence   - Turn and reposition patient  - Assist with mobility/ambulation  - Relieve pressure over bony prominences  - Avoid friction and shearing  - Provide appropriate hygiene as needed including keeping skin clean and dry  - Evaluate need for skin moisturizer/barrier cream  - Collaborate with interdisciplinary team   - Patient/family teaching  - Consider wound care consult   Outcome: Progressing     Problem: Potential for Falls  Goal: Patient will remain free of falls  Description: INTERVENTIONS:  - Educate patient/family on patient safety including physical limitations  - Instruct patient to call for assistance with activity   - Consult OT/PT to assist with strengthening/mobility   - Keep Call bell within reach  - Keep bed low and locked with side rails adjusted as appropriate  - Keep care items and personal belongings within reach  - Initiate and maintain comfort rounds  - Make Fall Risk Sign visible to staff  - Offer Toileting every 1 Hours, in advance of need  - Initiate/Maintain alarm  - Obtain necessary fall risk management equipment  - Apply yellow socks and bracelet for high fall risk patients  - Consider moving patient to room near nurses station  Outcome: Progressing     Problem: Neurological Deficit  Goal: Neurological status is stable or improving  Description: Interventions:  - Monitor and assess patient's level of consciousness, motor function, sensory function, and level of assistance needed for ADLs  - Monitor and report changes from baseline  Collaborate with interdisciplinary team to initiate plan and implement interventions as ordered  - Provide and maintain a safe environment  - Consider seizure precautions  - Consider fall precautions  - Consider aspiration precautions  - Consider bleeding precautions  Outcome: Progressing     Problem:  Activity Intolerance/Impaired Mobility  Goal: Mobility/activity is maintained at optimum level for patient  Description: Interventions:  - Assess and monitor patient  barriers to mobility and need for assistive/adaptive devices  - Assess patient's emotional response to limitations  - Collaborate with interdisciplinary team and initiate plans and interventions as ordered  - Encourage independent activity per ability   - Maintain proper body alignment  - Perform active/passive rom as tolerated/ordered  - Plan activities to conserve energy   - Turn patient as appropriate  Outcome: Progressing     Problem: Communication Impairment  Goal: Ability to express needs and understand communication  Description: Assess patient's communication skills and ability to understand information  Patient will demonstrate use of effective communication techniques, alternative methods of communication and understanding even if not able to speak  - Encourage communication and provide alternate methods of communication as needed  - Collaborate with case management/ for discharge needs  - Include patient/family/caregiver in decisions related to communication  Outcome: Progressing     Problem: Potential for Aspiration  Goal: Ventilated patient's risk of aspiration is minimized  Description: Assess and monitor vital signs, respiratory status, airway cuff pressure, and labs (WBC)  Monitor for signs of aspiration (tachypnea, cough, rales, wheezing, cyanosis, fever)  - Elevate head of bed 30 degrees if patient has tube feeding   - Monitor tube feeding  Outcome: Progressing     Problem: Nutrition  Goal: Nutrition/Hydration status is improving  Description: Monitor and assess patient's nutrition/hydration status for malnutrition (ex- brittle hair, bruises, dry skin, pale skin and conjunctiva, muscle wasting, smooth red tongue, and disorientation)  Collaborate with interdisciplinary team and initiate plan and interventions as ordered  Monitor patient's weight and dietary intake as ordered or per policy   Utilize nutrition screening tool and intervene per policy  Determine patient's food preferences and provide high-protein, high-caloric foods as appropriate  - Assist patient with eating   - Allow adequate time for meals   - Encourage patient to take dietary supplement as ordered  - Collaborate with clinical nutritionist   - Include patient/family/caregiver in decisions related to nutrition    Outcome: Progressing

## 2022-09-12 NOTE — QUICK NOTE
Spoke to patient's son Diana Peterson about discussion of hospice  Diana Peterson expresses he believes that there was family agreement that hospice consult will be made  Appears to be some confusion about what hospice entails and will plan to be on phone call with patient's wife and Diana Wesleyadam to discuss hospice house

## 2022-09-12 NOTE — PROGRESS NOTES
Spiritual Care Progress Note    2022  Patient: Fabrizio Mcleod : 1960  Admission Date & Time: 2022 1220  MRN: [id number] [facility]: [id number]      Fr Coty Currieter visited pt and family to provide prayer, Sacrament of Sick, and [address] with [employer]  The pt's family was present as Fr  Coty Fierro visited  Chaplains remain available                   Chaplaincy Interventions Utilized:   Exploration: Explored spiritual needs & resources    Collaboration: Facilitated respect for spiritual/cultural practice during hospitalization    Relationship Building: Cultivated a relationship of care and support    Ritual: Provided prayer and Provided ritual       22 1120   Clinical Encounter Type   Visited With Patient and family together   Routine Visit Follow-up   Referral From Family   Referral To    [facility] Encounters   [facility] Needs Prayer   Sacramental Encounters   Sacrament of Sick-Anointing Anointed   Sacrament Other Other (Comment)  ([place] with [employer])

## 2022-09-12 NOTE — QUICK NOTE
Discussed with Juan French, patient's wife, and palliative care team on conference call  Discussed that at this time hospice liaison mentioned bed at hospice house not being available  However should a bed be available, we will coordinate efforts with palliative care team, medical team, and hospice liaison to assess if patient is stable enough for transport to hospice house at that time  Juan French is understanding  We will continue comfort measures
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
## 2022-09-12 NOTE — PROGRESS NOTES
Pastoral Care Progress Note    2022  Patient: Tremaine Henry : 1960  Admission Date & Time: 2022 1220  MRN: [id number] CSN: [id number]      Family considering moving Pt  Into hospice care  Sincerely appreciate all the care and support they have received from [facility] staff  I sense they are grieving well and adjusting day by day to Nick's deteriorating condition and appropriate possible move into hospice

## 2022-09-12 NOTE — PROGRESS NOTES
Progress note - Palliative and Supportive Care   Violeta Joseph 57 y o  male [id number]    Patient Active Problem List   Diagnosis    Acute right thalamic intraparenchymal hemorrhage    Diabetes mellitus ([address] )    Cervical spinal stenosis    Hypertension    Obesity (BMI 30-39  9)    Leukocytosis    H/O cardiac arrest    Goals of care, counseling/discussion     Active issues specifically addressed today include:   Acute right thalamic intraparenchymal hemorrhage   Goals of care, counseling/discussion  H/O cardiac arrest     Plan:  1  Symptom management   Continue comfort care orders, including:  · Hydromorphone gtt 4 mg/hr, titrate as needed   · Hydromorphone 0 5 mg IV q15 minutes PRN breakthrough pain or dyspnea   · Tylenol GA PRN for fever   · Bisacodyl GA PRN constipation  · glycopyrrolate PRN excessive secretions  · haloperidol 0 5 mg IV q2 hr PRN agitation or vomiting   · Lorazepam 1 mg IV q10 min PRN anxiety, dyspnea   · Ondansetron 4 mg IV PRN N/V  · Continue oxygen via nasal cannula for comfort   · Comfort cart for patient, oral care, maintain current IV access   · Hospice house currently at capacity   · Patients family is interested in transport to hospice house if a bed opens up + patient is deemed stable for transport by hospital team, palliative team and hospice liaison   · Will continue to follow w/ hospice liaison for any updates in bed availability     2  Goals - comfort care  Code Status: comfort care - Level 4  Decisional apparatus:  Patient is not competent on my exam today  If competence is lost, patient's substitute decision maker would default to spouse by [place]  Advance Directive / Living Will / POLST:  None documented    Interval history:  Over the weekend, patient with increasing hydromorphone needs  Comfort orders in place as above  I have had ongoing discussions with family regarding possible transition to hospice house  Currently, the hospice house is unfortunately at capacity   I spoke with Humberto and hospital team on conference call this afternoon  We discussed our current plan to continue comfort care regimen here at the hospital with the contingency if a bed does open up at hospice house, we would need to reevaluate patient's clinical status to ensure transportation safety and stability  Humberto is aware and understanding of current plan, all questions were answered  MEDICATIONS / ALLERGIES:     all current active meds have been reviewed    Not on File    OBJECTIVE:    Physical Exam  Physical Exam  Vitals and nursing note reviewed  Constitutional:       Appearance: He is ill-appearing  Interventions: Nasal cannula in place  HENT:      Head: Normocephalic and atraumatic  Pulmonary:      Effort: Tachypnea present  Musculoskeletal:      Cervical back: Neck supple  Skin:     General: Skin is warm  Coloration: Skin is not mottled  Lab Results: I have personally reviewed pertinent labs  Imaging Studies: n/a  EKG, Pathology, and Other Studies: n/a    Counseling / Coordination of Care    Total floor / unit time spent today 20 minutes  Greater than 50% of total time was spent with the patient and / or family counseling and / or coordination of care

## 2022-09-13 PROBLEM — Z51.5 HOSPICE CARE: Status: ACTIVE | Noted: 2022-01-01

## 2022-09-13 PROBLEM — E11.9 DIABETES MELLITUS (HCC): Status: RESOLVED | Noted: 2022-01-01 | Resolved: 2022-01-01

## 2022-09-13 PROBLEM — D72.829 LEUKOCYTOSIS: Status: RESOLVED | Noted: 2022-01-01 | Resolved: 2022-01-01

## 2022-09-13 PROBLEM — I10 HYPERTENSION: Status: RESOLVED | Noted: 2022-01-01 | Resolved: 2022-01-01

## 2022-09-13 NOTE — PLAN OF CARE
Problem: MOBILITY - ADULT  Goal: Maintain or return to baseline ADL function  Description: INTERVENTIONS:  -  Assess patient's ability to carry out ADLs; assess patient's baseline for ADL function and identify physical deficits which impact ability to perform ADLs (bathing, care of mouth/teeth, toileting, grooming, dressing, etc )  - Assess/evaluate cause of self-care deficits   - Assess range of motion  - Assess patient's mobility; develop plan if impaired  - Assess patient's need for assistive devices and provide as appropriate  - Encourage maximum independence but intervene and supervise when necessary  - Involve family in performance of ADLs  - Assess for home care needs following discharge   - Consider OT consult to assist with ADL evaluation and planning for discharge  - Provide patient education as appropriate  9/13/2022 1405 by Lisset Schmitt LPN  Outcome: Adequate for Discharge  9/13/2022 1405 by Lisset Schmitt LPN  Outcome: Not Progressing  Goal: Maintains/Returns to pre admission functional level  Description: INTERVENTIONS:  - Perform BMAT or MOVE assessment daily    - Set and communicate daily mobility goal to care team and patient/family/caregiver  - Collaborate with rehabilitation services on mobility goals if consulted  - Perform Range of Motion 3 times a day  - Reposition patient every 2 hours    - Dangle patient 3 times a day  - Stand patient 3 times a day  - Ambulate patient 3 times a day  - Out of bed to chair 3 times a day   - Out of bed for meals 3 times a day  - Out of bed for toileting  - Record patient progress and toleration of activity level   9/13/2022 1405 by Lisset Schmitt LPN  Outcome: Adequate for Discharge  9/13/2022 1405 by Lisset Schmitt LPN  Outcome: Not Progressing     Problem: PAIN - ADULT  Goal: Verbalizes/displays adequate comfort level or baseline comfort level  Description: Interventions:  - Encourage patient to monitor pain and request assistance  - Assess pain using appropriate pain scale  - Administer analgesics based on type and severity of pain and evaluate response  - Implement non-pharmacological measures as appropriate and evaluate response  - Consider cultural and social influences on pain and pain management  - Notify physician/advanced practitioner if interventions unsuccessful or patient reports new pain  9/13/2022 1405 by Cherise Morton LPN  Outcome: Adequate for Discharge  9/13/2022 1405 by Cherise Morton LPN  Outcome: Not Progressing     Problem: INFECTION - ADULT  Goal: Absence or prevention of progression during hospitalization  Description: INTERVENTIONS:  - Assess and monitor for signs and symptoms of infection  - Monitor lab/diagnostic results  - Monitor all insertion sites, i e  indwelling lines, tubes, and drains  - Monitor endotracheal if appropriate and nasal secretions for changes in amount and color  - Byars appropriate cooling/warming therapies per order  - Administer medications as ordered  - Instruct and encourage patient and family to use good hand hygiene technique  - Identify and instruct in appropriate isolation precautions for identified infection/condition  9/13/2022 1405 by Cherise Morton LPN  Outcome: Adequate for Discharge  9/13/2022 1405 by Cherise Morton LPN  Outcome: Not Progressing  Goal: Absence of fever/infection during neutropenic period  Description: INTERVENTIONS:  - Monitor WBC    9/13/2022 1405 by Cherise Morton LPN  Outcome: Adequate for Discharge  9/13/2022 1405 by Cherise Morton LPN  Outcome: Not Progressing     Problem: SAFETY ADULT  Goal: Maintain or return to baseline ADL function  Description: INTERVENTIONS:  -  Assess patient's ability to carry out ADLs; assess patient's baseline for ADL function and identify physical deficits which impact ability to perform ADLs (bathing, care of mouth/teeth, toileting, grooming, dressing, etc )  - Assess/evaluate cause of self-care deficits   - Assess range of motion  - Assess patient's mobility; develop plan if impaired  - Assess patient's need for assistive devices and provide as appropriate  - Encourage maximum independence but intervene and supervise when necessary  - Involve family in performance of ADLs  - Assess for home care needs following discharge   - Consider OT consult to assist with ADL evaluation and planning for discharge  - Provide patient education as appropriate  9/13/2022 1405 by Cleo Castle LPN  Outcome: Adequate for Discharge  9/13/2022 1405 by Cleo Castle LPN  Outcome: Not Progressing  Goal: Maintains/Returns to pre admission functional level  Description: INTERVENTIONS:  - Perform BMAT or MOVE assessment daily    - Set and communicate daily mobility goal to care team and patient/family/caregiver  - Collaborate with rehabilitation services on mobility goals if consulted  - Perform Range of Motion 3 times a day  - Reposition patient every 2 hours    - Dangle patient 3 times a day  - Stand patient 3 times a day  - Ambulate patient 3 times a day  - Out of bed to chair 3 times a day   - Out of bed for meals 3 times a day  - Out of bed for toileting  - Record patient progress and toleration of activity level   9/13/2022 1405 by Cleo Castle LPN  Outcome: Adequate for Discharge  9/13/2022 1405 by Cleo Castle LPN  Outcome: Not Progressing  Goal: Patient will remain free of falls  Description: INTERVENTIONS:  - Educate patient/family on patient safety including physical limitations  - Instruct patient to call for assistance with activity   - Consult OT/PT to assist with strengthening/mobility   - Keep Call bell within reach  - Keep bed low and locked with side rails adjusted as appropriate  - Keep care items and personal belongings within reach  - Initiate and maintain comfort rounds  - Make Fall Risk Sign visible to staff  - Offer Toileting every 2 Hours, in advance of need  - Initiate/Maintain bedalarm  - Obtain necessary fall risk management equipment:   - Apply yellow socks and bracelet for high fall risk patients  - Consider moving patient to room near nurses station  9/13/2022 1405 by Morgan Saldaña LPN  Outcome: Adequate for Discharge  9/13/2022 1405 by Morgan Saldaña LPN  Outcome: Not Progressing     Problem: DISCHARGE PLANNING  Goal: Discharge to home or other facility with appropriate resources  Description: INTERVENTIONS:  - Identify barriers to discharge w/patient and caregiver  - Arrange for needed discharge resources and transportation as appropriate  - Identify discharge learning needs (meds, wound care, etc )  - Arrange for interpretive services to assist at discharge as needed  - Refer to Case Management Department for coordinating discharge planning if the patient needs post-hospital services based on physician/advanced practitioner order or complex needs related to functional status, cognitive ability, or social support system  9/13/2022 1405 by Morgan Saldaña LPN  Outcome: Adequate for Discharge  9/13/2022 1405 by Morgan Saldaña LPN  Outcome: Not Progressing     Problem: Knowledge Deficit  Goal: Patient/family/caregiver demonstrates understanding of disease process, treatment plan, medications, and discharge instructions  Description: Complete learning assessment and assess knowledge base  Interventions:  - Provide teaching at level of understanding  - Provide teaching via preferred learning methods  9/13/2022 1405 by Morgan Saldaña LPN  Outcome: Adequate for Discharge  9/13/2022 1405 by Morgan Saldaña LPN  Outcome: Not Progressing     Problem: Nutrition/Hydration-ADULT  Goal: Nutrient/Hydration intake appropriate for improving, restoring or maintaining nutritional needs  Description: Monitor and assess patient's nutrition/hydration status for malnutrition  Collaborate with interdisciplinary team and initiate plan and interventions as ordered    Monitor patient's weight and dietary intake as ordered or per policy  Utilize nutrition screening tool and intervene as necessary  Determine patient's food preferences and provide high-protein, high-caloric foods as appropriate       INTERVENTIONS:  - Monitor oral intake, urinary output, labs, and treatment plans  - Assess nutrition and hydration status and recommend course of action  - Evaluate amount of meals eaten  - Assist patient with eating if necessary   - Allow adequate time for meals  - Recommend/ encourage appropriate diets, oral nutritional supplements, and vitamin/mineral supplements  - Order, calculate, and assess calorie counts as needed  - Recommend, monitor, and adjust tube feedings and TPN/PPN based on assessed needs  - Assess need for intravenous fluids  - Provide specific nutrition/hydration education as appropriate  - Include patient/family/caregiver in decisions related to nutrition  9/13/2022 1405 by Kalvin Epley, LPN  Outcome: Adequate for Discharge  9/13/2022 1405 by Kalvin Epley, LPN  Outcome: Not Progressing     Problem: Prexisting or High Potential for Compromised Skin Integrity  Goal: Skin integrity is maintained or improved  Description: INTERVENTIONS:  - Identify patients at risk for skin breakdown  - Assess and monitor skin integrity  - Assess and monitor nutrition and hydration status  - Monitor labs   - Assess for incontinence   - Turn and reposition patient  - Assist with mobility/ambulation  - Relieve pressure over bony prominences  - Avoid friction and shearing  - Provide appropriate hygiene as needed including keeping skin clean and dry  - Evaluate need for skin moisturizer/barrier cream  - Collaborate with interdisciplinary team   - Patient/family teaching  - Consider wound care consult   9/13/2022 1405 by Kalvin Epley, LPN  Outcome: Adequate for Discharge  9/13/2022 1405 by Kalvin Epley, LPN  Outcome: Not Progressing     Problem: Potential for Falls  Goal: Patient will remain free of falls  Description: INTERVENTIONS:  - Educate patient/family on patient safety including physical limitations  - Instruct patient to call for assistance with activity   - Consult OT/PT to assist with strengthening/mobility   - Keep Call bell within reach  - Keep bed low and locked with side rails adjusted as appropriate  - Keep care items and personal belongings within reach  - Initiate and maintain comfort rounds  - Make Fall Risk Sign visible to staff  - Offer Toileting every 2 Hours, in advance of need  - Initiate/Maintain bedalarm  - Obtain necessary fall risk management equipment:   - Apply yellow socks and bracelet for high fall risk patients  - Consider moving patient to room near nurses station  9/13/2022 1405 by Tirso Garcias LPN  Outcome: Adequate for Discharge  9/13/2022 1405 by Tirso Garcias LPN  Outcome: Not Progressing     Problem: Neurological Deficit  Goal: Neurological status is stable or improving  Description: Interventions:  - Monitor and assess patient's level of consciousness, motor function, sensory function, and level of assistance needed for ADLs  - Monitor and report changes from baseline  Collaborate with interdisciplinary team to initiate plan and implement interventions as ordered  - Provide and maintain a safe environment  - Consider seizure precautions  - Consider fall precautions  - Consider aspiration precautions  - Consider bleeding precautions  9/13/2022 1405 by Tirso Garcias LPN  Outcome: Adequate for Discharge  9/13/2022 1405 by Tirso Garcias LPN  Outcome: Not Progressing     Problem: Activity Intolerance/Impaired Mobility  Goal: Mobility/activity is maintained at optimum level for patient  Description: Interventions:  - Assess and monitor patient  barriers to mobility and need for assistive/adaptive devices  - Assess patient's emotional response to limitations  - Collaborate with interdisciplinary team and initiate plans and interventions as ordered    - Encourage independent activity per ability   - Maintain proper body alignment  - Perform active/passive rom as tolerated/ordered  - Plan activities to conserve energy   - Turn patient as appropriate  9/13/2022 1405 by Willem Izaguirre LPN  Outcome: Adequate for Discharge  9/13/2022 1405 by Willem Izaguirre LPN  Outcome: Not Progressing     Problem: Communication Impairment  Goal: Ability to express needs and understand communication  Description: Assess patient's communication skills and ability to understand information  Patient will demonstrate use of effective communication techniques, alternative methods of communication and understanding even if not able to speak  - Encourage communication and provide alternate methods of communication as needed  - Collaborate with case management/ for discharge needs  - Include patient/family/caregiver in decisions related to communication  9/13/2022 1405 by Willem Izaguirre LPN  Outcome: Adequate for Discharge  9/13/2022 1405 by Willem Izaguirre LPN  Outcome: Not Progressing     Problem: Potential for Aspiration  Goal: Ventilated patient's risk of aspiration is minimized  Description: Assess and monitor vital signs, respiratory status, airway cuff pressure, and labs (WBC)  Monitor for signs of aspiration (tachypnea, cough, rales, wheezing, cyanosis, fever)  - Elevate head of bed 30 degrees if patient has tube feeding   - Monitor tube feeding  9/13/2022 1405 by Willem Izaguirre LPN  Outcome: Adequate for Discharge  9/13/2022 1405 by Willem Izaguirre LPN  Outcome: Not Progressing     Problem: Nutrition  Goal: Nutrition/Hydration status is improving  Description: Monitor and assess patient's nutrition/hydration status for malnutrition (ex- brittle hair, bruises, dry skin, pale skin and conjunctiva, muscle wasting, smooth red tongue, and disorientation)  Collaborate with interdisciplinary team and initiate plan and interventions as ordered    Monitor patient's weight and dietary intake as ordered or per policy  Utilize nutrition screening tool and intervene per policy  Determine patient's food preferences and provide high-protein, high-caloric foods as appropriate  - Assist patient with eating   - Allow adequate time for meals   - Encourage patient to take dietary supplement as ordered  - Collaborate with clinical nutritionist   - Include patient/family/caregiver in decisions related to nutrition    9/13/2022 1405 by Davie Garcia LPN  Outcome: Adequate for Discharge  9/13/2022 1405 by Davie Garcia LPN  Outcome: Not Progressing
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
## 2022-09-13 NOTE — PROGRESS NOTES
Pastoral Care Progress Note    2022  Patient: Caity Miller : 1960  Admission Date & Time: 2022 1220  MRN: [id number] CSN: [id number]        [address] visited for a while with Pt  Sister Yun Worthy  [facility] Hospice staff also updated Pt  Spouse Fatuma Marmolejo on speaker phone to say that a [facility] room opened today, did they want Pt  To have it? Family said yes, let's transfer Pt  To hospice room, and expressed hopefulness and gratefulness for the opportunity               Chaplaincy Interventions Utilized:       Exploration: Facilitated story telling    Collaboration: Consulted with interdisciplinary team    Relationship Building: Listened empathically and Provided silent and supportive presence            Chaplaincy Outcomes Achieved:  Expressed gratitude, Expressed peace, Expressed ultimate hope, and Made decisions          Spiritual Coping Strategies Utilized:   Spiritual comfort

## 2022-09-13 NOTE — PLAN OF CARE
Problem: MOBILITY - ADULT  Goal: Maintain or return to baseline ADL function  Description: INTERVENTIONS:  -  Assess patient's ability to carry out ADLs; assess patient's baseline for ADL function and identify physical deficits which impact ability to perform ADLs (bathing, care of mouth/teeth, toileting, grooming, dressing, etc )  - Assess/evaluate cause of self-care deficits   - Assess range of motion  - Assess patient's mobility; develop plan if impaired  - Assess patient's need for assistive devices and provide as appropriate  - Encourage maximum independence but intervene and supervise when necessary  - Involve family in performance of ADLs  - Assess for home care needs following discharge   - Consider OT consult to assist with ADL evaluation and planning for discharge  - Provide patient education as appropriate  Outcome: Not Progressing  Goal: Maintains/Returns to pre admission functional level  Description: INTERVENTIONS:  - Perform BMAT or MOVE assessment daily    - Set and communicate daily mobility goal to care team and patient/family/caregiver  - Collaborate with rehabilitation services on mobility goals if consulted  - Perform Range of Motion 3 times a day  - Reposition patient every 2 hours    - Dangle patient 3 times a day  - Stand patient 3 times a day  - Ambulate patient 3 times a day  - Out of bed to chair 3 times a day   - Out of bed for meals 3 times a day  - Out of bed for toileting  - Record patient progress and toleration of activity level   Outcome: Not Progressing     Problem: PAIN - ADULT  Goal: Verbalizes/displays adequate comfort level or baseline comfort level  Description: Interventions:  - Encourage patient to monitor pain and request assistance  - Assess pain using appropriate pain scale  - Administer analgesics based on type and severity of pain and evaluate response  - Implement non-pharmacological measures as appropriate and evaluate response  - Consider cultural and social influences on pain and pain management  - Notify physician/advanced practitioner if interventions unsuccessful or patient reports new pain  Outcome: Not Progressing     Problem: INFECTION - ADULT  Goal: Absence or prevention of progression during hospitalization  Description: INTERVENTIONS:  - Assess and monitor for signs and symptoms of infection  - Monitor lab/diagnostic results  - Monitor all insertion sites, i e  indwelling lines, tubes, and drains  - Monitor endotracheal if appropriate and nasal secretions for changes in amount and color  - [place] appropriate cooling/warming therapies per order  - Administer medications as ordered  - Instruct and encourage patient and family to use good hand hygiene technique  - Identify and instruct in appropriate isolation precautions for identified infection/condition  Outcome: Not Progressing  Goal: Absence of fever/infection during neutropenic period  Description: INTERVENTIONS:  - Monitor WBC    Outcome: Not Progressing     Problem: SAFETY ADULT  Goal: Maintain or return to baseline ADL function  Description: INTERVENTIONS:  -  Assess patient's ability to carry out ADLs; assess patient's baseline for ADL function and identify physical deficits which impact ability to perform ADLs (bathing, care of mouth/teeth, toileting, grooming, dressing, etc )  - Assess/evaluate cause of self-care deficits   - Assess range of motion  - Assess patient's mobility; develop plan if impaired  - Assess patient's need for assistive devices and provide as appropriate  - Encourage maximum independence but intervene and supervise when necessary  - Involve family in performance of ADLs  - Assess for home care needs following discharge   - Consider OT consult to assist with ADL evaluation and planning for discharge  - Provide patient education as appropriate  Outcome: Not Progressing  Goal: Maintains/Returns to pre admission functional level  Description: INTERVENTIONS:  - Perform BMAT or MOVE assessment daily    - Set and communicate daily mobility goal to care team and patient/family/caregiver  - Collaborate with rehabilitation services on mobility goals if consulted  - Perform Range of Motion 3 times a day  - Reposition patient every 2 hours    - Dangle patient 3 times a day  - Stand patient 3 times a day  - Ambulate patient 3 times a day  - Out of bed to chair 3 times a day   - Out of bed for meals 3 times a day  - Out of bed for toileting  - Record patient progress and toleration of activity level   Outcome: Not Progressing  Goal: Patient will remain free of falls  Description: INTERVENTIONS:  - Educate patient/family on patient safety including physical limitations  - Instruct patient to call for assistance with activity   - Consult OT/PT to assist with strengthening/mobility   - Keep Call bell within reach  - Keep bed low and locked with side rails adjusted as appropriate  - Keep care items and personal belongings within reach  - Initiate and maintain comfort rounds  - Make Fall Risk Sign visible to staff  - Offer Toileting every 2 Hours, in advance of need  - Initiate/Maintain bedalarm  - Obtain necessary fall risk management equipment:   - Apply yellow socks and bracelet for high fall risk patients  - Consider moving patient to room near nurses station  Outcome: Not Progressing     Problem: DISCHARGE PLANNING  Goal: Discharge to home or other facility with appropriate resources  Description: INTERVENTIONS:  - Identify barriers to discharge w/patient and caregiver  - Arrange for needed discharge resources and transportation as appropriate  - Identify discharge learning needs (meds, wound care, etc )  - Arrange for interpretive services to assist at discharge as needed  - Refer to Case Management Department for coordinating discharge planning if the patient needs post-hospital services based on physician/advanced practitioner order or complex needs related to functional status, cognitive ability, or social support system  Outcome: Not Progressing     Problem: Knowledge Deficit  Goal: Patient/family/caregiver demonstrates understanding of disease process, treatment plan, medications, and discharge instructions  Description: Complete learning assessment and assess knowledge base  Interventions:  - Provide teaching at level of understanding  - Provide teaching via preferred learning methods  Outcome: Not Progressing     Problem: Nutrition/Hydration-ADULT  Goal: Nutrient/Hydration intake appropriate for improving, restoring or maintaining nutritional needs  Description: Monitor and assess patient's nutrition/hydration status for malnutrition  Collaborate with interdisciplinary team and initiate plan and interventions as ordered  Monitor patient's weight and dietary intake as ordered or per policy  Utilize nutrition screening tool and intervene as necessary  Determine patient's food preferences and provide high-protein, high-caloric foods as appropriate       INTERVENTIONS:  - Monitor oral intake, urinary output, labs, and treatment plans  - Assess nutrition and hydration status and recommend course of action  - Evaluate amount of meals eaten  - Assist patient with eating if necessary   - Allow adequate time for meals  - Recommend/ encourage appropriate diets, oral nutritional supplements, and vitamin/mineral supplements  - Order, calculate, and assess calorie counts as needed  - Recommend, monitor, and adjust tube feedings and TPN/PPN based on assessed needs  - Assess need for intravenous fluids  - Provide specific nutrition/hydration education as appropriate  - Include patient/family/caregiver in decisions related to nutrition  Outcome: Not Progressing     Problem: Prexisting or High Potential for Compromised Skin Integrity  Goal: Skin integrity is maintained or improved  Description: INTERVENTIONS:  - Identify patients at risk for skin breakdown  - Assess and monitor skin integrity  - Assess and monitor nutrition and hydration status  - Monitor labs   - Assess for incontinence   - Turn and reposition patient  - Assist with mobility/ambulation  - Relieve pressure over bony prominences  - Avoid friction and shearing  - Provide appropriate hygiene as needed including keeping skin clean and dry  - Evaluate need for skin moisturizer/barrier cream  - Collaborate with interdisciplinary team   - Patient/family teaching  - Consider wound care consult   Outcome: Not Progressing     Problem: Potential for Falls  Goal: Patient will remain free of falls  Description: INTERVENTIONS:  - Educate patient/family on patient safety including physical limitations  - Instruct patient to call for assistance with activity   - Consult OT/PT to assist with strengthening/mobility   - Keep Call bell within reach  - Keep bed low and locked with side rails adjusted as appropriate  - Keep care items and personal belongings within reach  - Initiate and maintain comfort rounds  - Make Fall Risk Sign visible to staff  - Offer Toileting every 2 Hours, in advance of need  - Initiate/Maintain bedalarm  - Obtain necessary fall risk management equipment:   - Apply yellow socks and bracelet for high fall risk patients  - Consider moving patient to room near nurses station  Outcome: Not Progressing     Problem: Neurological Deficit  Goal: Neurological status is stable or improving  Description: Interventions:  - Monitor and assess patient's level of consciousness, motor function, sensory function, and level of assistance needed for ADLs  - Monitor and report changes from baseline  Collaborate with interdisciplinary team to initiate plan and implement interventions as ordered  - Provide and maintain a safe environment  - Consider seizure precautions  - Consider fall precautions  - Consider aspiration precautions  - Consider bleeding precautions  Outcome: Not Progressing     Problem:  Activity Intolerance/Impaired Mobility  Goal: Mobility/activity is maintained at optimum level for patient  Description: Interventions:  - Assess and monitor patient  barriers to mobility and need for assistive/adaptive devices  - Assess patient's emotional response to limitations  - Collaborate with interdisciplinary team and initiate plans and interventions as ordered  - Encourage independent activity per ability   - Maintain proper body alignment  - Perform active/passive rom as tolerated/ordered  - Plan activities to conserve energy   - Turn patient as appropriate  Outcome: Not Progressing     Problem: Communication Impairment  Goal: Ability to express needs and understand communication  Description: Assess patient's communication skills and ability to understand information  Patient will demonstrate use of effective communication techniques, alternative methods of communication and understanding even if not able to speak  - Encourage communication and provide alternate methods of communication as needed  - Collaborate with case management/ for discharge needs  - Include patient/family/caregiver in decisions related to communication  Outcome: Not Progressing     Problem: Potential for Aspiration  Goal: Ventilated patient's risk of aspiration is minimized  Description: Assess and monitor vital signs, respiratory status, airway cuff pressure, and labs (WBC)  Monitor for signs of aspiration (tachypnea, cough, rales, wheezing, cyanosis, fever)  - Elevate head of bed 30 degrees if patient has tube feeding   - Monitor tube feeding  Outcome: Not Progressing     Problem: Nutrition  Goal: Nutrition/Hydration status is improving  Description: Monitor and assess patient's nutrition/hydration status for malnutrition (ex- brittle hair, bruises, dry skin, pale skin and conjunctiva, muscle wasting, smooth red tongue, and disorientation)  Collaborate with interdisciplinary team and initiate plan and interventions as ordered    Monitor patient's weight and dietary intake as ordered or per policy  Utilize nutrition screening tool and intervene per policy  Determine patient's food preferences and provide high-protein, high-caloric foods as appropriate  - Assist patient with eating   - Allow adequate time for meals   - Encourage patient to take dietary supplement as ordered  - Collaborate with clinical nutritionist   - Include patient/family/caregiver in decisions related to nutrition    Outcome: Not Progressing

## 2022-09-13 NOTE — UTILIZATION REVIEW
Continued Stay Review    Date: 9/13                          Current Patient Class: Inpatient  Current Level of Care: Med Surg    HPI:61 y o  male initially admitted on 8/26    Assessment/Plan:   9/12  Comfort care  Not responding to verbal stimuli, sternal rub and painful stimuli  Per Palliative Care cons; Continue oxygen via nasal cannula for comfort  Comfort cart for patient, oral care, maintain current IV access   Hospice house currently at capacity   Patients family is interested in transport to hospice house if a bed opens up + patient is deemed stable for transport by hospital team, palliative team and hospice liaison     9/13  Discharged to [facility]      Vital Signs:   09/13/22 0911 100 4 °F (38 °C) -- -- -- -- -- --   09/13/22 0854 -- -- -- -- 28 2 L/min Nasal cannula   09/12/22 2213 102 4 °F (39 1 °C) Abnormal  -- -- -- -- -- --   09/12/22 1930 -- -- -- -- 28 2 L/min Nasal cannula       Pertinent Labs/Diagnostic Results:       Results from last 7 days   Lab Units 09/10/22  0549 09/09/22  0611 09/08/22  0447 09/07/22  2241 09/07/22  1148   WBC Thousand/uL 11 42* 12 57* 14 90* 14 75*  --    HEMOGLOBIN g/dL 8 6* 9 3* 10 0* 10 6* 10 9*   HEMATOCRIT % 26 3* 27 9* 30 1* 30 9* 32 8*   PLATELETS Thousands/uL 217 211 179 193  --    NEUTROS ABS Thousands/µL 9 39* 10 84* 13 08*  --   --          Results from last 7 days   Lab Units 09/10/22  0549 09/09/22 2056 09/09/22  0530 09/08/22  0447 09/07/22  1148 09/07/22  0449   SODIUM mmol/L 149* 148* 146 148* 150* 150*   POTASSIUM mmol/L 2 8* 3 2* 3 6 3 2* 3 5 3 5   CHLORIDE mmol/L 115* 116* 114* 115* 118* 119*   CO2 mmol/L 33* 31 28 27 25 25   ANION GAP mmol/L 1* 1* 4 6 7 6   BUN mg/dL 36* 42* 41* 41* 46* 45*   CREATININE mg/dL 0 81 1 12 0 97 1 04 1 14 1 22   EGFR ml/min/1 73sq m 95 70 83 76 68 63   CALCIUM mg/dL 7 8* 8 3 8 1* 7 7* 7 9* 7 6*   CALCIUM, IONIZED mmol/L 1 12  --   --   --   --   --    MAGNESIUM mg/dL 2 8* 2 9*  --   --   --   -- PHOSPHORUS mg/dL 2 3 2 3  --   --   --  3 8     Results from last 7 days   Lab Units 09/09/22  0530   AST U/L 29   ALT U/L 79*   ALK PHOS U/L 104   TOTAL PROTEIN g/dL 5 7*   ALBUMIN g/dL 2 1*   TOTAL BILIRUBIN mg/dL 0 47   BILIRUBIN DIRECT mg/dL 0 17     Results from last 7 days   Lab Units 09/10/22  1156 09/10/22  0959 09/10/22  0754 09/10/22  0549 09/10/22  0441 09/10/22  0212 09/10/22  0008 09/09/22  2207 09/09/22  2006 09/09/22  1810 09/09/22  1612 09/09/22  1346   POC GLUCOSE mg/dl 173* 213* 129 160* 157* 190* 208* 178* 214* 212* 219* 293*     Results from last 7 days   Lab Units 09/10/22  0549 09/09/22  2056 09/09/22  0530 09/08/22  0447 09/07/22  1148 09/07/22  0449   GLUCOSE RANDOM mg/dL 150* 194* 309* 300* 260* 308*         Results from last 7 days   Lab Units 09/09/22  0000 09/08/22  1805 09/08/22  1132 09/08/22  0445 09/07/22  2241 09/07/22  0449   PROTIME seconds  --   --   --   --  16 7* 17 9*   INR   --   --   --   --  1 33* 1 45*   PTT seconds 67* 77* 82*   < > 34  --     < > = values in this interval not displayed  Results from last 7 days   Lab Units 09/06/22  1524   LACTIC ACID mmol/L 1 4       Results from last 7 days   Lab Units 09/08/22  1806 09/07/22  1630 09/06/22  1539 09/06/22  1524   BLOOD CULTURE  No Growth After 4 Days  No Growth After 4 Days  --  No Growth After 5 Days  No Growth After 5 Days     SPUTUM CULTURE   --  3+ Growth of Candida albicans*  4+ Growth of Candida dubliniensis*  3+ Growth of   --   --    GRAM STAIN RESULT   --  4+ Polys*  2+ Budding Yeast with Pseudomycelia*  Rare Gram positive rods*  --   --        Medications:   Scheduled Medications:  polyethylene glycol, 17 g, Oral, Daily  senna-docusate sodium, 2 tablet, Oral, BID      Continuous IV Infusions:  HYDROmorphone, 4 mg/hr, Intravenous, Continuous      PRN Meds:  acetaminophen, 650 mg, Rectal, Q6H PRN  bisacodyl, 10 mg, Rectal, Daily PRN  glycerin-hypromellose-, 1 drop, Both Eyes, Q6H PRN  glycopyrrolate, 0 1 mg, Intravenous, Q4H PRN  haloperidol lactate, 0 5 mg, Intravenous, Q2H PRN  HYDROmorphone, 0 5 mg, Intravenous, Q15 Min PRN 9/12 x 10, 9/13 x7  LORazepam, 1 mg, Intravenous, Q10 Min PRN  ondansetron, 4 mg, Intravenous, Q4H PRN      Discharge Plan: See above    Network Utilization Review Department  ATTENTION: Please call with any questions or concerns to [phone] and carefully listen to the prompts so that you are directed to the right person  All voicemails are confidential   Evin Giron all requests for admission clinical reviews, approved or denied determinations and any other requests to dedicated fax number below belonging to the campus where the patient is receiving treatment   List of dedicated fax numbers for the Facilities:  [address] DENIALS (Administrative/Medical Necessity) [phone]   [address] (Maternity/NICU/Pediatrics) [phone] [address]  [address] [facility] [address] [address] [place] [address] [address] [facility] [place] [address] [phone]

## 2022-09-13 NOTE — CASE MANAGEMENT
Case Management Discharge Planning Note    Patient name Renata Mccrary  [facility] [id number]  : 1960 Date 2022       Current Admission Date: 2022  Current Admission Diagnosis:Acute right thalamic intraparenchymal hemorrhage   Patient Active Problem List    Diagnosis Date Noted    H/O cardiac arrest 2022    Goals of care, counseling/discussion 2022    Leukocytosis 2022    Hypertension 2022    Obesity (BMI 30-39 9) 2022    Cervical spinal stenosis 2022    Diabetes mellitus ([facility] [address] ) 2022    Acute right thalamic intraparenchymal hemorrhage 2022      LOS (days): 18  Geometric Mean LOS (GMLOS) (days):   Days to GMLOS:     OBJECTIVE:  Risk of Unplanned Readmission Score: 12 4         Current admission status: Inpatient   Preferred Pharmacy: No Pharmacies Listed  Primary Care Provider: No primary care provider on file  Primary Insurance: BLUE CROSS  Secondary Insurance:     DISCHARGE DETAILS:    Discharge planning discussed with[de-identified] WifeZenobia of Choice: Yes  Comments - Freedom of Choice: Discussed fOC  CM contacted family/caregiver?: Yes  Were Treatment Team discharge recommendations reviewed with patient/caregiver?: Yes  Did patient/caregiver verbalize understanding of patient care needs?: Yes  Were patient/caregiver advised of the risks associated with not following Treatment Team discharge recommendations?: Yes    Contacts  Patient Contacts: wife Idris Olivo  Relationship to Patient[de-identified] Family    Other Referral/Resources/Interventions Provided:  Referral Comments: WifeChris agreeable to  hospice house, Referral placed in [address], hospice house able to accept today  Transport at Discharge : [facility]  Dispatcher Contacted: Yes  Number/Name of Dispatcher: [facility]  Transported by [employer] and Unit #):  [facility]  ETA of Transport (Date): 22  ETA of Transport (Time): [address] Name, [facility] :   hospice house

## 2022-09-13 NOTE — PROGRESS NOTES
Pastoral Care Progress Note    2022  Patient: Leonora Carey : 1960  Admission Date & Time: 2022 1220  MRN: [id number] CSN: [id number]                     Chaplaincy Interventions Utilized:   Empowerment: Normalized experience of patient/family    Exploration: Facilitated story telling    Collaboration: Facilitated respect for spiritual/cultural practice during hospitalization    Relationship Building: Listened empathically    Ritual: {Ritual:64994}    ***        Chaplaincy Outcomes Achieved:  { Outcomes Achieved:32896}    ***      Spiritual Coping Strategies Utilized:   {Spiritual Coping Strategies:97097}    ***
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
## 2022-09-13 NOTE — PROGRESS NOTES
Progress note - Palliative and Supportive Care   Cherie Morgan 57 y o  male [id number]    Patient Active Problem List   Diagnosis    Acute right thalamic intraparenchymal hemorrhage    Diabetes mellitus ([address] )    Cervical spinal stenosis    Hypertension    Obesity (BMI 30-39  9)    Leukocytosis    H/O cardiac arrest    Goals of care, counseling/discussion     Active issues specifically addressed today include:   Acute right thalamic intraparenchymal hemorrhage   Goals of care, counseling/discussion  H/O cardiac arrest       Plan:  1  Symptom management   #comfort care, continue orders, including the following:   · Hydromorphone gtt 4 mg/hr, titrate as needed   · Hydromorphone 0 5 mg IV q15 minutes PRN breakthrough pain or dyspnea   · Tylenol TX PRN for fever   · Bisacodyl TX PRN constipation  · glycopyrrolate PRN excessive secretions  · haloperidol 0 5 mg IV q2 hr PRN agitation or vomiting   · Lorazepam 1 mg IV q10 min PRN anxiety, dyspnea   · Ondansetron 4 mg IV PRN N/V  · Continue oxygen via nasal cannula for comfort   · Comfort cart for patient, oral care, maintain current IV access   ? Hospice house with availability today   ? Confirmed decision to pursue hospice with patient's family, Carolina Jenkins   ? 1 mg IV dilaudid to be given as a bolus dose prior to transportation     Code Status:  Comfort care - Level 4   Decisional apparatus:  Patient is not competent on my exam today  If competence is lost, patient's substitute decision maker would default to spouse by [place]  Advance Directive / Living Will / POLST:  None documented    Interval history:  Patient continues on comfort care measures  Continuing to check-in with hospice liaison regarding availability at [place] [id number]  MEDICATIONS / ALLERGIES:     all current active meds have been reviewed    Not on File    OBJECTIVE:    Physical Exam  Physical Exam  Vitals and nursing note reviewed  Constitutional:       General: He is not in acute distress  Appearance: He is ill-appearing  He is not diaphoretic  Interventions: Nasal cannula in place  HENT:      Head: Normocephalic and atraumatic  Cardiovascular:      Pulses: Normal pulses  Pulmonary:      Effort: Tachypnea present  Breath sounds: Decreased air movement present  Abdominal:      General: There is no distension  Musculoskeletal:      Cervical back: Neck supple  Skin:     General: Skin is warm and dry  Neurological:      Mental Status: He is unresponsive  Lab Results:  n/a  Imaging Studies: n/a  EKG, Pathology, and Other Studies: n/a     Counseling / Coordination of Care    Total floor / unit time spent today 15 minutes  Greater than 50% of total time was spent with the patient and / or family counseling and / or coordination of care

## 2022-09-14 NOTE — UTILIZATION REVIEW
Notification of Discharge   This is a Notification of Discharge from our facility [address]  Please be advised that this patient has been discharge from our facility  Below you will find the admission and discharge date and time including the patients disposition  UTILIZATION REVIEW CONTACT:  Ema Chaidez  Utilization   Network Utilization Review Department  Phone: [phone] x carefully listen to the prompts  All voicemails are confidential   Email: [email or web address]     PHYSICIAN ADVISORY SERVICES:  FOR [id number]-ZO-UANE REVIEW - MEDICAL NECESSITY DENIAL  Phone: [phone]  Fax: [phone]  Email: [email or web address]     PRESENTATION DATE: 2022 12:20 PM  OBERVATION ADMISSION DATE:   INPATIENT ADMISSION DATE: 22 12:20 PM   DISCHARGE DATE: 2022  1:18 PM  DISPOSITION:        IMPORTANT INFORMATION:  Send all requests for admission clinical reviews, approved or denied determinations and any other requests to dedicated fax number below belonging to the campus where the patient is receiving treatment   List of dedicated fax numbers:  [address] DENIALS (Administrative/Medical Necessity) [phone]   [address] (Maternity/NICU/Pediatrics) [phone]   Otilia Ang [phone]   [address] [facility] [phone]   [address] [place] [address] [phone]    [facility] [address] [place] [address],[address] [phone]   [facility]  [phone]   [address] [facility]  [address] [facility] [address] [facility] [phone]

## 2022-09-16 ENCOUNTER — HOME CARE VISIT (OUTPATIENT)
Dept: HOME HOSPICE | Facility: HOSPICE | Age: 62
End: 2022-09-16
Payer: COMMERCIAL

## 2022-09-16 NOTE — HOSPICE
Ameena Patterson, Bereavement Final IDG 22 (1MR, 3LR) Due: 10/11/22  : 1960  SOC: 22 13:23  DOD: 22 14:58, <24hrs  Diagnosis: Brain Hemorrhage  Primary: Wife - Jayde Robles was a 57year old man at the [facility]  Wife of 32 years, Addison Dent, and her son Mary Figueroa visited  Addison Dent said she was "still trying to wrap her head around everything"  Prior to her stroke, Shahbaz Alfaro was living with Addison Dent  He was retired and enjoyed watching Steelers and Nascar  He also loved collecting comics  Addison Dent stated they met at work and both love star chuchok and [facility] Jordan Dent laughed, "when nerds fall in love"  Nick's sister Donavanharriet Álvarezz was involved  He has a daughter, Ileana Coello, who lives in [place] and did see her father at the hospital  Addison Dent has a supportive relationship with Ileana Coello and Donavan Remy  TOD: Addison Dent was in the room when Shahbaz Alfaro   She wears oxygen and was having difficulty breathing  She was making phone calls but had difficulty talking  MSW assisted in [address]  Jen expressed shock  Addison Dent called her son and Ileana Remy and Mary Henry came into the [facility] to provide support to Addison Dent  Mary Figueroa expressed gratitude for care  Call Assignments:  Nino Urban to reassess wife Xiomara Beyer at   (Due: 22)  Melissa Berry to reassess sister Jonel Neri and Jermaine Goode at [facility] and assess daughter Elida Roper "Ileana Coello" Bren at [facility]  Please request addresses for Madeline Smith and Ileana Coello, if interested in mailings   (Due: 10/11/22)

## 2024-03-26 ENCOUNTER — DOCUMENTATION (OUTPATIENT)
Dept: HOME HEALTH SERVICES | Facility: HOME HEALTHCARE | Age: 64
End: 2024-03-26

## 2025-02-07 NOTE — PHYSICAL THERAPY NOTE
PHYSICAL THERAPY NOTE          Patient Name: Semaj SCHROEDER Date: 9/2/2022 09/02/22 1335   PT Last Visit   PT Visit Date 09/02/22   Note Type   Note Type Treatment   Pain Assessment   Pain Assessment Tool 0-10   Pain Score 7   Pain Location/Orientation Location: Head   Hospital Pain Intervention(s) Repositioned; Emotional support   Restrictions/Precautions   Weight Bearing Precautions Per Order No   Other Precautions Cognitive; Chair Alarm; Bed Alarm;Multiple lines;Telemetry; Fall Risk;Pain  (L hemiparesis, L neglect  -140)   General   Chart Reviewed Yes   Response to Previous Treatment Patient reporting fatigue but able to participate  Family/Caregiver Present No   Cognition   Overall Cognitive Status Impaired   Arousal/Participation Cooperative;Lethargic   Attention Attends with cues to redirect   Orientation Level Oriented to person;Oriented to place;Oriented to situation;Disoriented to time   Following Commands Follows one step commands with increased time or repetition   Comments lethargic, overall pleasant   Subjective   Subjective agreeable to participate   Bed Mobility   Rolling R 2  Maximal assistance   Additional items Assist x 1; Increased time required;Verbal cues;LE management   Rolling L 2  Maximal assistance   Additional items Assist x 1; Increased time required;LE management;Verbal cues   Supine to Sit 2  Maximal assistance   Additional items Assist x 2; Increased time required;Verbal cues;LE management   Sit to Supine 2  Maximal assistance   Additional items Assist x 2; Increased time required;Verbal cues;LE management   Transfers   Sit to Stand 2  Maximal assistance   Additional items Assist x 2; Increased time required;Verbal cues   Stand to Sit 2  Maximal assistance   Additional items Assist x 2; Increased time required;Verbal cues   Stand pivot 2  Maximal assistance   Additional items Assist x 2; Increased time required;Verbal cues   Additional Comments R HHA and 2nd therapist anterior with L knee block and LUE supported   Balance   Static Sitting Poor   Dynamic Sitting Poor -   Static Standing Poor -   Dynamic Standing Poor -   Ambulatory Zero   Endurance Deficit   Endurance Deficit Yes   Endurance Deficit Description fatigue, L hemiparesis   Activity Tolerance   Activity Tolerance Patient limited by fatigue;Patient limited by pain   Medical Staff [address], [facility] tech   Nurse Made Aware pt cleared to see and mobilize per nursing   Exercises   Hip Flexion Sitting;5 reps; Left;PROM   Knee AROM Long [employer] reps;PROM; Left   Ankle Pumps Sitting;5 reps;PROM; Left   Balance training  static/dynamic sitting EOB x10 min c modAx1 progressing to reaching with RUE for object x4 reps c maxAx1   Assessment   Prognosis Fair   Problem List Decreased strength;Decreased endurance; Impaired balance;Decreased mobility; Decreased cognition;Decreased coordination; Impaired judgement;Decreased safety awareness;Pain   Assessment Pt agreeable to participate in PT session  Pt performed functional mobility and therex as outlined above  Limited by fatigue and weakness throughout session  L neglect noted and time spent with cues to attend to L side  Reaching task with RUE while EOB  Reaching with RUE and crossing midline to L to promote L sided attention and promote trunk strength  VC/TC for midline orientation and trunks stability as well as neck extension for neutral positioning  Pt left supine in bed with b/l railings up, call bell, phone, and all personal needs within reach  RN made aware of faulty bed alarm  Pt will continue to benefit from skilled acute care PT to further address their functional mobility limitations  D/C recommendations remain rehab     Barriers to Discharge Inaccessible home environment;Decreased caregiver support   Goals   Patient Goals to rest   STG Expiration Date 09/09/22   PT Treatment Day 3 Plan   Treatment/Interventions Functional transfer training;LE strengthening/ROM; Therapeutic exercise; Endurance training;Patient/family training;Equipment eval/education;Cognitive reorientation; Bed mobility;Spoke to nursing;OT;Compensatory technique education   Progress Slow progress, decreased activity tolerance   PT Frequency Other (Comment)  (3-6x/wk)   Recommendation   PT Discharge Recommendation Post acute rehabilitation services   AM-PAC Basic Mobility Inpatient   Turning in Bed Without Bedrails 2   Lying on Back to Sitting on Edge of Flat Bed 1   Moving Bed to Chair 1   Standing Up From Chair 1   Walk in Room 1   Climb 3-5 Stairs 1   Basic Mobility Inpatient Raw Score 7   Turning Head Towards Sound 3   Follow Simple Instructions 3   Low Function Basic Mobility Raw Score 13   Low Function Basic Mobility Standardized Score 20 14   Highest Level Of Mobility   JH-HLM Goal 2: Bed activities/Dependent transfer   JH-HLM Achieved 4: Move to chair/commode   Charito Rincon, PT, DPT Right arm;
